# Patient Record
Sex: FEMALE
[De-identification: names, ages, dates, MRNs, and addresses within clinical notes are randomized per-mention and may not be internally consistent; named-entity substitution may affect disease eponyms.]

---

## 2018-03-19 ENCOUNTER — HOSPITAL ENCOUNTER (INPATIENT)
Dept: HOSPITAL 68 - ERH | Age: 58
LOS: 7 days | Discharge: HOSPICE | DRG: 207 | End: 2018-03-26
Attending: INTERNAL MEDICINE | Admitting: INTERNAL MEDICINE
Payer: COMMERCIAL

## 2018-03-19 VITALS — HEIGHT: 59 IN | WEIGHT: 177 LBS | BODY MASS INDEX: 35.68 KG/M2

## 2018-03-19 DIAGNOSIS — Z99.11: ICD-10-CM

## 2018-03-19 DIAGNOSIS — R33.9: ICD-10-CM

## 2018-03-19 DIAGNOSIS — N35.9: ICD-10-CM

## 2018-03-19 DIAGNOSIS — Z79.4: ICD-10-CM

## 2018-03-19 DIAGNOSIS — J69.0: Primary | ICD-10-CM

## 2018-03-19 DIAGNOSIS — G89.29: ICD-10-CM

## 2018-03-19 DIAGNOSIS — I45.10: ICD-10-CM

## 2018-03-19 DIAGNOSIS — R91.1: ICD-10-CM

## 2018-03-19 DIAGNOSIS — D63.1: ICD-10-CM

## 2018-03-19 DIAGNOSIS — F32.9: ICD-10-CM

## 2018-03-19 DIAGNOSIS — G93.1: ICD-10-CM

## 2018-03-19 DIAGNOSIS — E03.9: ICD-10-CM

## 2018-03-19 DIAGNOSIS — R15.9: ICD-10-CM

## 2018-03-19 DIAGNOSIS — N12: ICD-10-CM

## 2018-03-19 DIAGNOSIS — N36.5: ICD-10-CM

## 2018-03-19 DIAGNOSIS — E27.40: ICD-10-CM

## 2018-03-19 DIAGNOSIS — E66.3: ICD-10-CM

## 2018-03-19 DIAGNOSIS — Z79.52: ICD-10-CM

## 2018-03-19 DIAGNOSIS — K21.9: ICD-10-CM

## 2018-03-19 DIAGNOSIS — R32: ICD-10-CM

## 2018-03-19 DIAGNOSIS — Z66: ICD-10-CM

## 2018-03-19 DIAGNOSIS — J96.01: ICD-10-CM

## 2018-03-19 DIAGNOSIS — N18.6: ICD-10-CM

## 2018-03-19 DIAGNOSIS — G47.33: ICD-10-CM

## 2018-03-19 DIAGNOSIS — E21.3: ICD-10-CM

## 2018-03-19 DIAGNOSIS — I10: ICD-10-CM

## 2018-03-19 DIAGNOSIS — E10.641: ICD-10-CM

## 2018-03-19 DIAGNOSIS — Z87.891: ICD-10-CM

## 2018-03-19 DIAGNOSIS — N17.9: ICD-10-CM

## 2018-03-19 DIAGNOSIS — M45.9: ICD-10-CM

## 2018-03-19 DIAGNOSIS — E10.22: ICD-10-CM

## 2018-03-19 DIAGNOSIS — R00.1: ICD-10-CM

## 2018-03-19 DIAGNOSIS — E10.319: ICD-10-CM

## 2018-03-19 DIAGNOSIS — I46.9: ICD-10-CM

## 2018-03-19 DIAGNOSIS — I47.2: ICD-10-CM

## 2018-03-19 DIAGNOSIS — I21.A1: ICD-10-CM

## 2018-03-19 DIAGNOSIS — Z51.5: ICD-10-CM

## 2018-03-19 LAB
ABSOLUTE GRANULOCYTE CT: 14.5 /CUMM (ref 1.4–6.5)
APTT BLD: 28 SEC (ref 25–37)
BASOPHILS # BLD: 0 /CUMM (ref 0–0.2)
BASOPHILS NFR BLD: 0.2 % (ref 0–2)
EOSINOPHIL # BLD: 0.1 /CUMM (ref 0–0.7)
EOSINOPHIL NFR BLD: 0.6 % (ref 0–5)
ERYTHROCYTE [DISTWIDTH] IN BLOOD BY AUTOMATED COUNT: 18.6 % (ref 11.5–14.5)
GRANULOCYTES NFR BLD: 89.2 % (ref 42.2–75.2)
HCT VFR BLD CALC: 34.8 % (ref 37–47)
LYMPHOCYTES # BLD: 1.5 /CUMM (ref 1.2–3.4)
MCH RBC QN AUTO: 26.3 PG (ref 27–31)
MCHC RBC AUTO-ENTMCNC: 32.7 G/DL (ref 33–37)
MCV RBC AUTO: 80.5 FL (ref 81–99)
MONOCYTES # BLD: 0.1 /CUMM (ref 0.1–0.6)
PLATELET # BLD: 405 /CUMM (ref 130–400)
PMV BLD AUTO: 6.9 FL (ref 7.4–10.4)
PROTHROMBIN TIME: 13.1 SEC (ref 9.4–12.5)
RED BLOOD CELL CT: 4.33 /CUMM (ref 4.2–5.4)
WBC # BLD AUTO: 16.2 /CUMM (ref 4.8–10.8)

## 2018-03-19 PROCEDURE — 06HY33Z INSERTION OF INFUSION DEVICE INTO LOWER VEIN, PERCUTANEOUS APPROACH: ICD-10-PCS | Performed by: EMERGENCY MEDICINE

## 2018-03-19 PROCEDURE — 5A1955Z RESPIRATORY VENTILATION, GREATER THAN 96 CONSECUTIVE HOURS: ICD-10-PCS | Performed by: INTERNAL MEDICINE

## 2018-03-19 PROCEDURE — 0BH17EZ INSERTION OF ENDOTRACHEAL AIRWAY INTO TRACHEA, VIA NATURAL OR ARTIFICIAL OPENING: ICD-10-PCS | Performed by: INTERNAL MEDICINE

## 2018-03-19 PROCEDURE — P9016 RBC LEUKOCYTES REDUCED: HCPCS

## 2018-03-19 PROCEDURE — 0T9B70Z DRAINAGE OF BLADDER WITH DRAINAGE DEVICE, VIA NATURAL OR ARTIFICIAL OPENING: ICD-10-PCS | Performed by: UROLOGY

## 2018-03-19 NOTE — RADIOLOGY REPORT
EXAMINATION:
CHEST 1 VIEW
 
CLINICAL INFORMATION:
Endotracheal tube placement.
 
COMPARISON:
Same day chest radiograph obtained at 0906 hours.
 
TECHNIQUE:
An AP view of the chest is provided.
 
FINDINGS:
The cardiac silhouette is not enlarged. An endotracheal tube has been placed.
The tip is approximately 5 to 10 mm above the kaushal. There are neither
pleural effusions nor pneumothoraces. There is a persistent right upper lobe
mass adjacent to the mediastinal pleura with mild adjacent nonspecific patchy
airspace disease. There is improved aeration at the left lung base with
minimal airspace disease persisting. The osseous structures are unremarkable.
 
IMPRESSION:
 
Tip of endotracheal tube approximately 5 to 10 mm above the kaushal.
 
Persistent right upper lobe mass.
 
Improved aeration at the left lung base.

## 2018-03-19 NOTE — EVENT NOTE
Event Note
Event Note:
Situation
Hypoglycemia now resolved
 
Background
58 year old insulin diabetic admitted for unresponsive subsequently developing 
cardiac arrest s/p CPR/ROSC. Insulin pump was reported discontinued yesterday, 
however her insulin level today is found to be high.
 
Assessment
Patient with apparent unintended insulin overdose with profound hypoglycemia and
cardiac arrest. Patient was initially hypotensive requiring levophed, dopamine, 
and hydrocortisone. She was maintained on D10 in addition to D5 + Bicarb. Blood 
sugars jeyson to over 200. Endocrinologist Dr Olmedo was made aware of these whom 
recommended discontinuing D10 and evening hydrocortisone (cortisol was found to 
be normal) and started levemir / novolog coverage. Insulin pump was provided by 
 and was placed with patients belongings. SBP was found to be greater 
that 180 for which dopamine was discontinued and levophed reduced.
 
Plan
-Discontinue D10
-Continue D5 + Bicarb
-Start Levemir 12 units SC BID
-Start Novolog SSI with Accuchecks Q4H
-Skip evening hydrocortisone
-Titrate Levophed as needed

## 2018-03-19 NOTE — RADIOLOGY REPORT
EXAMINATION:
XR PORTABLE CHEST
 
CLINICAL INFORMATION:
Repositioning of endotracheal tube.
 
COMPARISON:
03/19/2018 at 5:00 PM
 
TECHNIQUE:
Portable frontal view of the chest was obtained.
 
FINDINGS:
Since the prior study there has been retraction of the endotracheal tube now
terminating approximately 5.6 cm above the kaushal. A nasogastric tube is now
evident with tip projecting over the stomach. The cardiomediastinal
silhouette is stable. There are hazy bilateral airspace opacities seen in
both lungs, slightly more conspicuous at the left base than previously which
may be technical in nature. A small left-sided pleural effusion may be
present. No pneumothoraces are visualized.
 
IMPRESSION:
Endotracheal tube terminates 5.6 cm above the kaushal. Nasogastric tube tip
projects over the stomach.
 
Bilateral patchy airspace opacities are redemonstrated, perhaps slightly
increased in conspicuity at the left base relative to the prior study.

## 2018-03-19 NOTE — CONS- ENDOCRINOLOGY
General Information and HPI
 
Consulting Request
Date of Consult: 03/19/18
Requested By: ICU team
Reason for Consult:
management of DM type 1  and severe hypoglycemia and unresponsiveness
Source of Information: family
Exam Limitations: unable to give history
History of Present Illness:
57 y/o female hx of longstanding DM type 1, chronic renal disease due to 
diabetes and chronic interstitual nephritis and was treated with a couse of 
steroid. As per patient's , patient took prednisone 10 mg last night. Her
glucose level was running low and insulin pump was supposed to be discontinued 
last night.
 
Patient was found to be unresponsive with foaming around her mouth. EMS was 
called and her glucose level was in the 20s. IV dextrose was given. Her glucose 
level improved temporarily and then dropped again.
 
When I was called to see the patient in ER at around 4 pm, her FSG was in the 
50s and her SBP was in the 50s as well. She was intubated. She was on bicarb 
drip in d5w. Stress dose of steroid and D10 w were recommended.  Then patient 
had cardiac arrest and she was coded.
 
NOw she is in ICU. She is on pressor and bicarb drip in D5W. Her BP and glucse 
level were better. D10 w was discontinued. Her recent FSG was 252.
 
 
 
Allergies/Medications
Allergies:
Uncoded Allergies:
all pain medications (NAUSEA 10/14/14)
 
Home Med List:
Amlodipine Besylate 10 MG TABLET   1 TAB PO DAILY HEART  (Reported)
Aspirin (Aspirin*) 81 MG TAB.CHEW   1 TAB PO DAILY HEART HEALTH  (Reported)
Escitalopram Oxalate 5 MG TABLET   1 TAB PO DAILY MENTAL HEALTH  (Reported)
Furosemide 40 MG TABLET   1 TAB PO DAILY WATER RETENTION  (Reported)
Insulin Aspart (Novolog) (Unknown Strength) VIAL   (Unknown Dose) AC DIABETES  (
Reported)
Levothyroxine Sodium 50 MCG TABLET   1 TAB PO DAILY AC THYROID  (Reported)
Losartan Potassium 100 MG TABLET   1 TAB PO DAILY HEART  (Reported)
Prednisone 10 MG TABLET   1 TAB PO DAILY STEROID  (Reported)
Sevelamer Carbonate (Renvela) 800 MG TABLET   1 TAB PO AC UNKNOWN  (Reported)
 
 
Review of Systems
 
Review of Systems
Constitutional:
Reports: see HPI (intubated). 
 
Past History
 
Travel History
Traveled to [place] past 21 day No
 
Medical History
Blood Transfusion Hx: No
Neurological: NONE
EENT: NONE
Cardiovascular: hypertension
Respiratory: obstructive sleep apnea
Gastrointestinal: NONE
Hepatic: NONE
Renal: chronic kidney disease
Musculoskeletal: arthritis
Psychiatric: NONE
Endocrine: diabetes type 1
Blood Disorders: NONE
Cancer(s): NONE
GYN/Reproductive: NONE
 
Surgical History
Surgical History: L AVF
 
Family History
Relations & Conditions If Any:
MOTHER (Diabetes, hyperlipidemia).
 
 
Psychosocial History
Where Do You Live? Home
Services at Home: None
Smoking Status: Former Smoker
ETOH Use: occasional use
Illicit Drug Use: denies illicit drug use
 
Exam & Diagnostic Data
Last 24 Hrs of Vital Signs/I&O
 Vital Signs
 
 
Date Time Temp Pulse Resp B/P B/P Pulse O2 O2 Flow FiO2
 
     Mean Ox Delivery Rate 
 
03/19 2000         75
 
03/19 2000      99 Ventilator 85% 
 
03/19 1950       Ventilator 85% 
 
03/19 1825 100.0 126 18 136/64  100 Ventilator 100% 
 
03/19 1758 99.7 128 18 181/68  100 Ventilator  
 
03/19 1727 100.1 126 20 136/64  100 Ventilator 100% 
 
03/19 1705  165 18 199/80  98 Ventilator 100% 
 
03/19 1623         100
 
03/19 1553 97.9 122 18 152/72  100   
 
03/19 1430  111    93   
 
03/19 1419  110 24 119/63  97 BIPAP 60% 
 
03/19 1330      96   
 
03/19 1210 96.9 96 24 128/72  99 BIPAP 60% 
 
03/19 1150  111    97   
 
03/19 1102 97.0 90 24 137/83  99 BIPAP 60% 
 
03/19 1000  88 24 129/78  100 BIPAP 100% 
 
03/19 1000  111    96   
 
03/19 0900      97 Non 9L 
 
       ReBreather  
 
03/19 0845      96 Non 100% 
 
       ReBreather  
 
03/19 0842 97.5 110 28 151/79  87 Room Air Room Air 
 
 
 Intake & Output
 
 
 03/19 1600 03/19 0800 03/19 0000
 
Intake Total 1250  
 
Output Total 3  
 
Balance 1247  
 
    
 
Intake, IV 1250  
 
Output, Urine 3  
 
Patient 160 lb  
 
Weight   
 
Weight Estimated  
 
Measurement   
 
Method   
 
 
 
 
Physical Exam
General Appearance: intubated
Respiratory: coarse breath  sound
Cardiovascular: bradycardia ( in ER)
Extremities: no edema
Labs/Sid Results:
 Laboratory Tests
 
 
 03/19 03/19 03/19 03/19 2057 1931 1931 1710
 
Blood Gas    
 
  pH (7.35 - 7.45 PH)    7.06 *L
 
  pCO2 (35 - 45 TORR)    42
 
  pO2 (80 - 100 TORR)    167  H
 
  HCO3 (21 - 28 MEQ/L)    12  L
 
  ABG O2 Sat (Measured) (>96.0 %)    97.0
 
  P-50 (Temp Corrected)    N
 
  Carboxyhemoglobin (1.5 - 5.0 %)    0.3  L
 
  O2 Concentration %    100%
 
  Temperature (97.0 - 100.0 FARH)    97.5
 
  Respiration Rate (BPM)    26
 
  O2 Delivery Method    ESPRIT VENT
 
  Vent Mode    AC
 
  Expiratory Pressure (CMH2O/P)    5
 
  Tidal Volume (CC)    500
 
Chemistry    
 
  Sodium (137 - 145 mmol/L)   144 
 
  Potassium (3.5 - 5.1 mmol/L)   5.6  H 
 
  Chloride (98 - 107 mmol/L)   108  H 
 
  Carbon Dioxide (22 - 30 mmol/L)   15  L 
 
  Anion Gap (5 - 16)   20  H 
 
  BUN (7 - 17 mg/dL)   95  H 
 
  Creatinine (0.5 - 1.0 mg/dL)   6.3 *H 
 
  Estimated GFR (>60 ml/min)   7  L 
 
  Glucose (65 - 99 mg/dL)   228  H 
 
  Lactic Acid (0.7 - 2.1 mmol/L)  3.8  H  
 
  Calcium (8.4 - 10.2 mg/dL)   7.2  L 
 
  Phosphorus (2.5 - 4.5 mg/dL)   10.7  H 
 
  Magnesium (1.6 - 2.3 mg/dL)   1.7 
 
  Total Bilirubin (0.2 - 1.3 mg/dL)   0.5 
 
  AST (14 - 36 U/L)   253  H 
 
  ALT (9 - 52 U/L)   161  H 
 
  Troponin I Pending   
 
  Albumin (3.5 - 5.0 g/dL)   2.3  L 
 
Coagulation    
 
  PT (9.4 - 12.5 SEC)   13.1  H 
 
  INR (0.90 - 1.19)   1.20  H 
 
  APTT (25 - 37 SEC)   28 
 
Miscellaneous    
 
  Phlebotomy Draw Site    RIGHT BRACHIAL
 
 
 
 
 03/19 03/19 03/19
 
 1510 1307 1140
 
Blood Gas   
 
  pH (7.35 - 7.45 PH)   7.29 *L
 
  pCO2 (35 - 45 TORR)   32  L
 
  pO2 (80 - 100 TORR)   238  H
 
  HCO3 (21 - 28 MEQ/L)   15  L
 
  ABG O2 Sat (Measured) (>96.0 %)   98.0
 
  Carboxyhemoglobin (1.5 - 5.0 %)   0.3  L
 
  O2 Concentration %   100%
 
  Respiration Rate (BPM)   24
 
  O2 Delivery Method   BIPAP
 
  Vent Mode   ST
 
  Expiratory Pressure (CM H2O P)   6
 
  Inspiratory Pressure (CM H2O P)   16
 
Chemistry   
 
  Lactic Acid (0.7 - 2.1 mmol/L)  2.0 
 
  Troponin I (< 0.11 ng/ml) 3.04 *H  
 
Miscellaneous   
 
  Phlebotomy Draw Site   RIGHT RADIAL
 
 
 
 
 03/19 03/19
 
 1000 0900
 
Blood Gas  
 
  pH (7.35 - 7.45 PH)  7.23 *L
 
  pCO2 (35 - 45 TORR)  38
 
  pO2 (80 - 100 TORR)  92
 
  HCO3 (21 - 28 MEQ/L)  16  L
 
  ABG O2 Sat (Measured) (>96.0 %)  94.0  L
 
  Carboxyhemoglobin (1.5 - 5.0 %)  0.5  L
 
  O2 Concentration %  100%
 
  O2 Delivery Method  NRB
 
Chemistry  
 
  Sodium (137 - 145 mmol/L) 146  H 
 
  Potassium (3.5 - 5.1 mmol/L) 4.7 
 
  Chloride (98 - 107 mmol/L) 111  H 
 
  Carbon Dioxide (22 - 30 mmol/L) 16  L 
 
  Anion Gap (5 - 16) 18  H 
 
  BUN (7 - 17 mg/dL) 95  H 
 
  Creatinine (0.5 - 1.0 mg/dL) 6.2 *H 
 
  Estimated GFR (>60 ml/min) 7  L 
 
  BUN/Creatinine Ratio (7 - 25 %) 15.3 
 
  Glucose (65 - 99 mg/dL) 86 
 
  Hemoglobin A1c (4.2 - 5.8 %) 8.8  H 
 
  Insulin Level (3.0 - 25.0 mIU/mL) 31.0  H 
 
  Lactic Acid (0.7 - 2.1 mmol/L) 2.3  H 
 
  Calcium (8.4 - 10.2 mg/dL) 8.2  L 
 
  Phosphorus (2.5 - 4.5 mg/dL) 8.8  H 
 
  Magnesium (1.6 - 2.3 mg/dL) 1.8 
 
  Total Bilirubin (0.2 - 1.3 mg/dL) 0.4 
 
  AST (14 - 36 U/L) 29 
 
  ALT (9 - 52 U/L) 28 
 
  Alkaline Phosphatase (<127 U/L) 48 
 
  Creatine Kinase (30 - 135 U/L) 248  H 
 
  Troponin I (< 0.11 ng/ml) 2.31 *H 
 
  Total Protein (6.3 - 8.2 g/dL) 4.9  L 
 
  Albumin (3.5 - 5.0 g/dL) 2.7  L 
 
  Globulin (1.9 - 4.2 gm/dL) 2.2 
 
  Albumin/Globulin Ratio (1.1 - 2.2 %) 1.2 
 
  TSH (0.270 - 4.200 uIU/mL) 9.510  H 
 
  Free T4 (0.78 - 2.44 ng/dL) 0.89 
 
  Prolactin (3.0 - 18.6 ng/mL) 29.0  H 
 
  Cortisol AM Sample (4.46 - 22.7 ug/dL) 26.5  H 
 
Hematology  
 
  CBC w Diff MAN DIFF ORDERED 
 
  WBC (4.8 - 10.8 /CUMM) 16.2  H 
 
  RBC (4.20 - 5.40 /CUMM) 4.33 
 
  Hgb (12.0 - 16.0 G/DL) 11.4  L 
 
  Hct (37 - 47 %) 34.8  L 
 
  MCV (81.0 - 99.0 FL) 80.5  L 
 
  MCH (27.0 - 31.0 PG) 26.3  L 
 
  MCHC (33.0 - 37.0 G/DL) 32.7  L 
 
  RDW (11.5 - 14.5 %) 18.6  H 
 
  Plt Count (130 - 400 /CUMM) 405  H 
 
  MPV (7.4 - 10.4 FL) 6.9  L 
 
  Gran % (42.2 - 75.2 %) 89.2  H 
 
  Lymphocytes % (20.5 - 51.1 %) 9.1  L 
 
  Monocytes % (1.7 - 9.3 %) 0.9  L 
 
  Eosinophils % (0 - 5 %) 0.6 
 
  Basophils % (0.0 - 2.0 %) 0.2 
 
  Absolute Granulocytes (1.4 - 6.5 /CUMM) 14.5  H 
 
  Segmented Neutrophils (42.2 - 75.2 %) 73 
 
  Band Neutrophils (0.0 - 5.0 %) 13  H 
 
  Absolute Lymphocytes (1.2 - 3.4 /CUMM) 1.5 
 
  Lymphocytes (20.5 - 51.1 %) 11  L 
 
  Monocytes (1.7 - 9.3 %) 3 
 
  Absolute Monocytes (0.10 - 0.60 /CUMM) 0.1 
 
  Absolute Eosinophils (0.0 - 0.7 /CUMM) 0.1 
 
  Absolute Basophils (0.0 - 0.2 /CUMM) 0 
 
  Platelet Estimate (ADEQUATE) INCREASED 
 
  Normocytic RBCs VERIFIED 
 
  Normochromic RBCs VERIFIED 
 
  Poikilocytosis FEW 
 
  Lilliam Cells FEW 
 
Miscellaneous  
 
  Phlebotomy Draw Site  RIGHT BRACHIAL
 
 
 
 
Assessment/Plan
Assessment/Plan
57 y/o female hx of longstanding DM type 1, chronic renal disease due to 
diabetes and chronic interstitual nephritis and was treated with a couse of 
steroid. As per patient's , patient took prednisone 10 mg last night. Her
glucose level was running low and insulin pump was supposed to be discontinued 
last night.
 
Patient was found to be unresponsive with glucose level was in the 20s. Blood 
work in ER showed inappropriately elevated insulin level.  Her severe 
hypoglycemia most likely is due to insulin overdose.
 
She received hydrocortisone 100 mg iv in ER. She was on D10 w and pressor. Now 
her BP and glucose level improved.
 
According to her insulin pump-- [employer] 630 G-- her basal insulin 36.75 units 
per 24 hours.
 
Plan for now:
1. start evemir 12 units twice a day;
2. Novolog coveage every 4 hours--detail see the inpatient DM order;
3. Levothyroxine 25 mcg iv daily;
4. might consider stress dose of hydrocortisone 50 mg iv every 12 hours tomorrow
;
5. monitor FSG every 1-2 hours; insulin drip as it is indicated;
6. monitor electrolytes;
7. continue other treatments as per team;
will follow
6. 
 
 
Inpatient Diabetes Orders
Every 4 Hours:
   Bolus Insulin: Novolog
   < 80 mg/dl: no coverage
    mg/dl: no coverage
   101-120 mg/dl: no coverage
   121-150 mg/dl: no coverage
   151-200 mg/dl: 2 units
   201-250 mg/dl: 4 units
   251-300 mg/dl: 6 units
   301-350 mg/dl: 8 units
   351-400 mg/dl: 10 units
   > 400 mg/dl: 12 units
 
Consult Acknowledgment
- Thank you for your consult request.

## 2018-03-19 NOTE — ED GENERAL ADULT
History of Present Illness
 
General
Chief Complaint: Altered Mental Status
Stated Complaint: BIBA, UNRESPONSIVE
Source: patient, family, old records, EMS
Exam Limitations: clinical condition
Allergies
Uncoded Allergies:
all pain medications (NAUSEA 10/14/14)
 
Reconcile Medications
Amlodipine Besylate 10 MG TABLET   1 TAB PO DAILY HEART  (Reported)
Aspirin (Aspirin*) 81 MG TAB.CHEW   1 TAB PO DAILY HEART HEALTH  (Reported)
Escitalopram Oxalate 5 MG TABLET   1 TAB PO DAILY MENTAL HEALTH  (Reported)
Furosemide 40 MG TABLET   1 TAB PO DAILY WATER RETENTION  (Reported)
Insulin Aspart (Novolog) (Unknown Strength) VIAL   (Unknown Dose) AC DIABETES  (
Reported)
Levothyroxine Sodium 50 MCG TABLET   1 TAB PO DAILY AC THYROID  (Reported)
Losartan Potassium 100 MG TABLET   1 TAB PO DAILY HEART  (Reported)
Prednisone 10 MG TABLET   1 TAB PO DAILY STEROID  (Reported)
Sevelamer Carbonate (Renvela) 800 MG TABLET   1 TAB PO AC UNKNOWN  (Reported)
 
Core Measure Meds Pre-Hospital aspirin
Triage Note:
BIBA FROM HOME, PER EMS MALE FAMILY MEMBER AT
 HOUSE CALLED 911 FOR UNRESPONSIVE THIS AM. PT IS
 DIALYSIS PT LEFT ARM RESTRICTED, ACCUCHECK ON
 SCENE 27, GIVEN D10 ENROUTE, REPEAT ENROUTE 86 AND
 ON ARRIVAL 160'S. PT REMAINS UNRESPONSIVE, UPPER
 AIRWAY CONGESTION NOTED, O2 HIGH FLOW
 NON-REBREATHER PLACED, O2 SATS ON ARRIVAL 86-87%,
 ON HIGH FLOW O2 SATS 96-98%, RESPIRATORY PAGED FOR
 ABG'S AND RESP EVAL.
Triage Nurses Notes Reviewed? yes
Onset: Just prior to arrival
Duration: hour(s):, constant, continues in ED
Timing: recent history
Injury Environment: home
Severity: severe
No Modifying Factors: none
Associated Symptoms: cough
LMP (ages 10-50): post menopausal
Pregnant: No
Patient currently breastfeeds: No
HPI:
1 day prior to admission patient felt weak and stayed in bed.  Her  saw 
her at 7 PM sleeps in another room.  He was up.  For work and found her with 
increased work of breathing nausea and vomiting not responsive to vocal 
stimulation.
EMS found patient hypoglycemic and administered D10.
He reports there's been no fever chills chest pain cough headache dysuria rash 
bleeding.
(Silva BARLOW,Madhu)
 
Vital Signs & Intake/Output
Vital Signs & Intake/Output
 Vital Signs
 
 
Date Time Temp Pulse Resp B/P B/P Pulse O2 O2 Flow FiO2
 
     Mean Ox Delivery Rate 
 
03/19 1758 99.7 128 18 181/68  100 Ventilator  
 
03/19 1727 100.1 126 20 136/64  100 Ventilator 100% 
 
03/19 1623         100
 
03/19 1430  111    93   
 
03/19 1419  110 24 119/63  97 BIPAP 60% 
 
03/19 1330      96   
 
03/19 1210 96.9 96 24 128/72  99 BIPAP 60% 
 
03/19 1150  111    97   
 
03/19 1102 97.0 90 24 137/83  99 BIPAP 60% 
 
03/19 1000  88 24 129/78  100 BIPAP 100% 
 
03/19 1000  111    96   
 
03/19 0900      97 Non 9L 
 
       ReBreather  
 
03/19 0845      96 Non 100% 
 
       ReBreather  
 
03/19 0842 97.5 110 28 151/79  87 Room Air Room Air 
 
 
 
(Ibrahima Johnston)
 
Past History
 
Travel History
Traveled to [place] past 21 day No
 
Medical History
Any Pertinent Medical History? see below for history
Cardiovascular: hypertension
Respiratory: obstructive sleep apnea
Renal: ESRD on HD
 
Surgical History
Surgical History: L AVF
 
Psychosocial History
What is your primary language English
Tobacco Use: UN
 
Family History
Hx Contributory? No
(Madhu Ascencio MD)
 
Review of Systems
 
Review of Systems
Constitutional:
Reports: see HPI, weakness. 
EENTM:
Reports: no symptoms. 
Respiratory:
Reports: see HPI, cough, short of breath. 
Cardiovascular:
Reports: no symptoms. 
GI:
Reports: see HPI, diarrhea, nausea, vomiting. 
Genitourinary:
Reports: no symptoms. 
Musculoskeletal:
Reports: no symptoms. 
Skin:
Reports: no symptoms. 
Neurological/Psychological:
Reports: see HPI, weakness. 
Hematologic/Endocrine:
Reports: no symptoms. 
Immunologic/Allergic:
Reports: no symptoms. 
All Other Systems: Reviewed and Negative
(Madhu Ascencio MD)
 
Physical Exam
 
Physical Exam
General Appearance: well developed/nourished, lethargic, severe distress, obese
Head: atraumatic, normal appearance
Eyes:
Bilateral: normal appearance, PERRL, other (nystagmus, rotatory). 
Ears, Nose, Throat: normal pharynx, normal ENT inspection
Neck: normal inspection, supple, full range of motion, no midline tenderness
Respiratory: chest non-tender, decreased breath sounds, rhonchi, rales, 
respiratory distress
Cardiovascular: regular rate/rhythm, normal peripheral pulses, tachycardia, 
norml femoral pulses equa
Peripheral Pulses:
4+ carotid (R), 4+ carotid (L)
Gastrointestinal: normal bowel sounds, soft, non-tender, no organomegaly
Back: normal inspection, normal range of motion, no vertebral tenderness
Extremities: normal inspection, normal capillary refill, normal range of motion,
no ligament instability
Neurologic/Psych: disoriented x 3, motor/sensory deficits
Reflexes:
2+: bicep (R), bicep (L). 
Skin: intact, normal color, warm/dry
Lymphatic: no anterior cervical cullen
 
Core Measures
ACS in differential dx? Yes
CVA/TIA Diagnosis: No
Sepsis Present: Yes
Sepsis Focused Exam Completed? Yes
(Madhu Ascencio MD)
 
ED Sepsis Exam
Date of Focused Sepsis Exam: 03/19/18
Time of Focused Sepsis Exam: 1700
Sepsis Cardiac Exam: Tachycardia
Sepsis Resp Exam: Ronchi
Sepsis Cap Refill Exam: >2 sec
Sepsis Peripheral Pulse Exam: Weak
Sepsis Peripheral Pulse Location: Radial
Sepsis Skin Color Exam: Pale
Skin Temp/Moisture Exam: Warm/Dry
(Madhu Ascencio MD)
 
Progress
Differential Diagnoses
I considered the following diagnoses in my evaluation of the patient: Aspiration
pneumonia stroke hypoglycemia DKA
 
Diagnostic Imaging:
Viewed by Me: Radiology Read, CT Scan.  Discussed w/RAD: Radiology Read, CT 
Scan. 
Radiology Impression: Nondiagnostic CT of the head secondary to significant 
streak/motion artifact despite repeating the acquisition. I cannot exclude 
infarcts nor hemorrhage on this study.
CXR Impression: 1. There is a stable mass in the right upper lobe, seen on prior
imaging. 2. Patchy opacification at the left base may be consistent with 
developing consolidation.
Initial ED EKG: normal axis, normal intervals, normal p-waves, normal QRS 
complex, rhythm (sinus tachycardia)
Prior EKG: unchanged
Rhythm Strip: sinus tachycardia
Comments:
Sepsis NS fluid bolus not given secondary to ESRD and potential for volume 
overload.
20 minutes after intubation patient was noted to be hypotensive with bradycardia
leading to asystole.
ACLS protocol started.  Patient with return of spontaneous circulation dopamine 
and norepinephrine drip started.
Dopamine stopped norepinephrine titrated.
Amiodarone administered for wide-complex tachycardia.
(Madhu Ascencio MD)
Differential Diagnoses
I cons
 
Plan of Care:
 Orders
 
 
Procedure Date/time Status
 
ARTERIAL BLOOD GAS (GEN) 03/20 0500 Active
 
XRY-PORTABLE CHEST XRAY 03/20 0500 Active
 
INSULIN,SERUM 03/20 0500 Active
 
ICU LAB BUNDLE 03/20 0500 Active
 
CBC WITHOUT DIFFERENTIAL 03/20 0500 Active
 
TROPONIN LEVEL 03/20 0300 Active
 
Nothing by Mouth 03/19 D Active
 
ICU LAB BUNDLE 03/19 2300 Active
 
TROPONIN LEVEL 03/19 2100 Active
 
EKG 03/19 2100 Active
 
PARTIAL THROMBOPLASTIN TIME 03/19 1906 Active
 
PROTHROMBIN TIME 03/19 1906 Active
 
ICU LAB BUNDLE 03/19 1904 Active
 
XRY-PORTABLE CHEST XRAY 03/19 1859 Active
 
VRE ACTIVE SURVIELLANCE 03/19 1849 Active
 
VENTILATOR PARAMETERS 03/19 1845 Complete
 
LACTIC ACID 03/19 1834 Active
 
VRE ACTIVE SURVIELLANCE 03/19 1823 Active
 
ACTIVE SURVEILLANCE NARES 03/19 1823 Active
 
Lab Add-on Test 03/19 1740 Active
 
TYPE & SCREEN (NOT X-MATCH) 03/19 1724 Active
 
EKG 03/19 1703 Active
 
URINE DRUGS OF ABUSE 03/19 1646 Active
 
Lab Add-on Test 03/19 1621 Active
 
Add-on Test (ER Only) 03/19 1606 Active
 
VENTILATOR PARAMETERS 03/19 1600 Complete
 
Add-on Test (ER Only) 03/19 1520 Active
 
LOWER RESPIRATORY CULTURE 03/19 1444 Active
 
ECHOCARDIOGRAM 03/19 1444 Active
 
Lab Add-on Test 03/19 1443 Active
 
TROPONIN LEVEL 03/19 1442 Complete
 
EKG 03/19 1442 Active
 
TRC EVALUATION (GEN) 03/19 1441 Active
 
Saline Lock 03/19 1441 Active
 
Pathway - chart 03/19 1441 Active
 
House Staff 03/19 1441 Active
 
Code Status 03/19 1441 Active
 
Patient Data 03/19 1422 Active
 
Admit to inpatient 03/19 1326 Active
 
BIPAP 03/19 1156 Complete
 
ARTERIAL BLOOD GAS (GEN) 03/19 1140 Complete
 
LACTIC ACID 03/19 1132 Complete
 
RAPID VIRAL INFLUENZA A 03/19 1020 Complete
 
BIPAP 03/19 1000 Complete
 
THYROID STIMULATING HORMONE 03/19 1000 Complete
 
PROLACTIN 03/19 1000 Complete
 
PHOSPHORUS 03/19 1000 Complete
 
INSULIN,SERUM 03/19 1000 Complete
 
GLYCOSYLATED HGB 03/19 1000 Complete
 
FREE T4 03/19 1000 Complete
 
CORTISOL AM 03/19 1000 Complete
 
Jose, Insertion/Removal/Asses 03/19 0922 Active
 
CULTURE,URINE 03/19 0922 Active
 
NIH Stroke Scale 03/19 0849 Active
 
ARTERIAL BLOOD GAS (GEN) 03/19 0832 Complete
 
BLOOD CULTURE 03/19 0832 Active
 
TROPONIN LEVEL 03/19 0832 Complete
 
MAGNESIUM 03/19 0832 Complete
 
LACTIC ACID 03/19 0832 Complete
 
COMPREHENSIVE METABOLIC PANEL 03/19 0832 Complete
 
CREATINE PHOSPHOKINASE 03/19 0832 Complete
 
CBC WITHOUT DIFFERENTIAL 03/19 0832 Complete
 
EKG 03/19 0832 Active
 
ARTERIAL BLOOD GAS (GEN) 03/19  UNK Complete
 
VTE Mechanical Prophylaxis 03/19  UNK Active
 
Vital Signs 03/19  UNK Active
 
OGT 03/19  UNK Active
 
Intake & Output 03/19  UNK Active
 
Hemoccult 03/19  UNK Active
 
FingerStick- Glucose 03/19  UNK Active
 
 
 Current Medications
 
 
  Sig/Susan Start time  Last
 
Medication Dose  Stop Time Status Admin
 
Hydrocortisone  50 MG Q12 03/20 1000 AC 
 
Sodium Succinate     
 
(Solucortef)     
 
Levothyroxine Sodium 25 MCG DAILY 03/20 1000 AC 
 
(Synthroid)     
 
Sodium Bicarbonate 150 MEQ Q10H 03/19 1900 AC 
 
(Sodium Bicarbonate      
 
8.4%)     
 
Dextrose/Water 1,000 ML    
 
(D5W 1000)     
 
Ampicillin Sodium/ 1,500 MG Q6 03/19 1800 CAN 
 
Sulbactam Sodium     
 
(Unasyn)     
 
Sodium Chloride 100 ML    
 
(Normal Saline 0.9%)     
 
Ampicillin Sodium/ 3,000 MG Q12 03/19 1730 AC 
 
Sulbactam Sodium     
 
(Unasyn)     
 
Sodium Chloride 100 ML    
 
(Normal Saline 0.9%)     
 
Heparin Sodium  25,000 UNIT Q24H 03/19 1730 AC 
 
(Porcine)     
 
(Heparin)     
 
Sodium Chloride 500 ML    
 
Pantoprazole Sodium 40 MG DAILY 03/19 1730 AC 
 
(Protonix)     
 
Acetaminophen 1,000 MG Q6P PRN 03/19 1445 AC 
 
(Ofirmev)     
 
Insulin Human Regular 0 Q6 03/19 1440 AC 03/19
 
(NovoLIN R)     1813
 
 
 Laboratory Tests
 
 
 
03/19/18 1710:
pH 7.06 *L, pCO2 42, pO2 167  H, HCO3 12  L, ABG O2 Sat (Measured) 97.0, P-50 (
Temp Corrected) N, Carboxyhemoglobin 0.3  L, O2 Concentration % 100%, 
Temperature 97.5, Respiration Rate 26, O2 Delivery Method ESPRIT VENT, Vent Mode
AC, Expiratory Pressure 5, Tidal Volume 500, Phlebotomy Draw Site RIGHT BRACHIAL
 
03/19/18 1510:
Troponin I 3.04 *H
 
03/19/18 1307:
Lactic Acid 2.0
 
03/19/18 1140:
pH 7.29 *L, pCO2 32  L, pO2 238  H, HCO3 15  L, ABG O2 Sat (Measured) 98.0, 
Carboxyhemoglobin 0.3  L, O2 Concentration % 100%, Respiration Rate 24, O2 
Delivery Method BIPAP, Vent Mode ST, Expiratory Pressure 6, Inspiratory Pressure
16, Phlebotomy Draw Site RIGHT RADIAL
 
03/19/18 1000:
Anion Gap 18  H, Estimated GFR 7  L, BUN/Creatinine Ratio 15.3, Glucose 86, 
Hemoglobin A1c 8.8  H, Insulin Level 31.0  H, Lactic Acid 2.3  H, Calcium 8.2  L
, Phosphorus 8.8  H, Magnesium 1.8, Total Bilirubin 0.4, AST 29, ALT 28, 
Alkaline Phosphatase 48, Creatine Kinase 248  H, Troponin I 2.31 *H, Total 
Protein 4.9  L, Albumin 2.7  L, Globulin 2.2, Albumin/Globulin Ratio 1.2, TSH 
9.510  H, Free T4 0.89, Prolactin 29.0  H, Cortisol AM Sample 26.5  H, CBC w 
Diff MAN DIFF ORDERED, RBC 4.33, MCV 80.5  L, MCH 26.3  L, MCHC 32.7  L, RDW 
18.6  H, MPV 6.9  L, Gran % 89.2  H, Lymphocytes % 9.1  L, Monocytes % 0.9  L, 
Eosinophils % 0.6, Basophils % 0.2, Absolute Granulocytes 14.5  H, Segmented 
Neutrophils 73, Band Neutrophils 13  H, Absolute Lymphocytes 1.5, Lymphocytes 11
 L, Monocytes 3, Absolute Monocytes 0.1, Absolute Eosinophils 0.1, Absolute 
Basophils 0, Platelet Estimate INCREASED, Normocytic RBCs VERIFIED, Normochromic
RBCs VERIFIED, Poikilocytosis FEW, Lilliam Cells FEW
 
03/19/18 0900:
pH 7.23 *L, pCO2 38, pO2 92, HCO3 16  L, ABG O2 Sat (Measured) 94.0  L, 
Carboxyhemoglobin 0.5  L, O2 Concentration % 100%, O2 Delivery Method NRB, 
Phlebotomy Draw Site RIGHT BRACHIAL
 Microbiology
03/19 1849  GI: Surveillance Culture - ORD
03/19 1823  UPPER RESP: Surveillance Culture - ORD
03/19 1823  GI: Surveillance Culture - ORD
03/19 1444  LOWER RESP: Respiratory Culture - ORD
03/19 1444  LOWER RESP: Gram Stain - ORD
03/19 1030  NASOPHARYN: Influenza Virus A & B Rapid Smear - COMP
03/19 1000  BLOOD: Blood Culture - RECD
03/19 0922  URINE ROUT: Urine Culture - ORD
03/19 0832  BLOOD: Blood Culture - ORD
 
 
(Ibrahima Johnston)
 
Departure
 
Departure
Disposition: STILL A PATIENT
Condition: Stable
Clinical Impression
Primary Impression: Aspiration pneumonia due to gastric secretions
Qualifiers:  Laterality: left Lung location: lower lobe of lung Qualified Code: 
J69.0 - Pneumonitis due to inhalation of food and vomit
Secondary Impressions: End stage renal disease, Hypoglycemia, Metabolic acidosis
, NSTEMI (non-ST elevated myocardial infarction)
Referrals:
Prem Stoddard MD (PCP/Family)
 
Departure Forms:
Customer Survey
General Discharge Information
 
Admission Note
Spoke With:
Maria T BARLOW,CONSUELO Howard
Documentation of Exam:
Documentation of any treatments & extenuating circumstances including Concerns 
Regarding Discharge (functional status, medication knowledge or non-compliance, 
living conditions, etc.) that warrant an admission rather than observation: 
Supplemental oxygen / BiPAP, IV antibiotics, ICU monitoring, serial EKG, serial 
lab exam, frequent neurologic checks neurology evaluation, renal evaluation, ICU
evaluation, medication adjustment, continuing care discharge planning,
 
(Madhu Ascencio MD)
 
Procedures
 
Intubation
Time of Intubation: 1616
Intubation Method: orotracheal
Tube Size (cm): 7.0
Medications: succinylcholine, etomidate
Breath Sounds After Intubation: equal
Intubation Complications: no complications
Post Intubation Xray? Yes
(Madhu Ascencio MD)
 
Central Line
Central Line Lumen: triple
Central Line Procedure:
Yes: bentadine prep?, sterile drapes applied, sterile dressing applied. 
Central Line Position: femoral (R)
Anesthesia: lidocaine 1%
CC's of Anesthesia: 5
Complications: none
Central Line Post Position: sutured, good blood return
(Ibrahima Johnston)
 
Critical Care Note
 
Critical Care Note
Critical Care Time:  min (90)
Total CPR Time (mins): 10
(Madhu Ascencio MD)

## 2018-03-19 NOTE — CONS- INFECT DISEASE
General Information and HPI
 
Consulting Request
Date of Consult: 03/19/18
Requested By:
CONSUELO Live MD
 
Reason for Consult:
Rule out sepsis
Source of Information: family
Exam Limitations: unable to give history, clinical condition
History of Present Illness:
This is a 58-year-old woman with a history of diabetes, end-stage renal disease,
status post creation of a left upper extremity fistula 6 months prior to 
admission in anticipation of dialysis, maintained on steroids for the last 
several months for biopsy-proven interstitial nephritis, status post a 
nondiagnostic navigational bronchoscopy and biopsy 4 months prior to admission 
for a right upper lobe mass, noted to be lethargic yesterday with recent URI 
symptoms, admitted today after she was found by her  this morning to be 
unresponsive, with a glucose of 27 reported en route to the hospital.  On 
arrival to the emergency room she was found to be unresponsive with foam around 
the mouth, with intermittent episodes of right arm stiffening noted.  She was 
afebrile, with a blood pressure of 151/79, an O2 sat of 86-87% on high flow 
oxygen and a glucose in the 160's.  Laboratory data revealed a white blood cell 
count of 16,000, with 73 segs and 13 bands, BUN/creatinine 95 and 6.2, lactic 
acid 2.3, with normal liver enzymes, , troponin 2.31, TSH 9.510, 
hemoglobin A1c 8.8, ABG 7.23/38/92 on 100% nonrebreather.  Chest x-ray revealed 
a stable mass in the right upper lobe and patchy opacification at the left lung 
base.  CT of the head, a limited study, was negative for any acute process.  She
was begun on Ceftriaxone and Azithromycin and was intubated.  She is now 
hypotensive, with a systolic blood pressure in the 50s.
 
 
Allergies/Medications
Allergies:
Uncoded Allergies:
all pain medications (NAUSEA 10/14/14)
 
Home Med List:
Amlodipine Besylate 10 MG TABLET   1 TAB PO DAILY HEART  (Reported)
Aspirin (Aspirin*) 81 MG TAB.CHEW   1 TAB PO DAILY HEART HEALTH  (Reported)
Escitalopram Oxalate 5 MG TABLET   1 TAB PO DAILY MENTAL HEALTH  (Reported)
Furosemide 40 MG TABLET   1 TAB PO DAILY WATER RETENTION  (Reported)
Insulin Aspart (Novolog) (Unknown Strength) VIAL   (Unknown Dose) AC DIABETES  (
Reported)
Levothyroxine Sodium 50 MCG TABLET   1 TAB PO DAILY AC THYROID  (Reported)
Losartan Potassium 100 MG TABLET   1 TAB PO DAILY HEART  (Reported)
Prednisone 10 MG TABLET   1 TAB PO DAILY STEROID  (Reported)
Sevelamer Carbonate (Renvela) 800 MG TABLET   1 TAB PO AC UNKNOWN  (Reported)
 
 
Past History
 
Travel History
Traveled to [place] past 21 day No
 
Medical History
Cardiovascular: hypertension
Respiratory: obstructive sleep apnea
Renal: chronic kidney disease
Musculoskeletal: arthritis
Endocrine: diabetes
 
Surgical History
Surgical History: L AVF
 
Review of Systems
Comments
Unobtainable
 
 
Exam & Diagnostic Data
Last 24 Hrs of Vital Signs/I&O
 Vital Signs
 
 
Date Time Temp Pulse Resp B/P B/P Pulse O2 O2 Flow FiO2
 
     Mean Ox Delivery Rate 
 
03/19 1430  111    93   
 
03/19 1419  110 24 119/63  97 BIPAP 60% 
 
03/19 1330      96   
 
03/19 1210 96.9 96 24 128/72  99 BIPAP 60% 
 
03/19 1150  111    97   
 
03/19 1102 97.0 90 24 137/83  99 BIPAP 60% 
 
03/19 1000  88 24 129/78  100 BIPAP 100% 
 
03/19 1000  111    96   
 
03/19 0900      97 Non 9L 
 
       ReBreather  
 
03/19 0845      96 Non 100% 
 
       ReBreather  
 
03/19 0842 97.5 110 28 151/79  87 Room Air Room Air 
 
 
 Intake & Output
 
 
 03/19 1600 03/19 0800 03/19 0000
 
Intake Total 1250  
 
Output Total 3  
 
Balance 1247  
 
    
 
Intake, IV 1250  
 
Output, Urine 3  
 
Patient 160 lb  
 
Weight   
 
Weight Estimated  
 
Measurement   
 
Method   
 
 
 
 
Physical Exam
Other Physical Findings:
Afebrile.  She is sedated and unresponsive now on the ventilator. Skin reveals 
no rash.  HEENT negative.  Neck is supple with no adenopathy.  Lungs bilateral 
rhonchi.  Heart regular rhythm with no murmur.  Abdomen is soft, nontender with 
positive bowel sounds.  Back no CVA tenderness.  Extremities no cyanosis, 
clubbing or edema; left upper extremity fistula with a positive bruit.  Neuro is
without focality.
 
Last 24 Hours of Lab Results:
 Laboratory Tests
 
 
 03/19 03/19 03/19
 
 1510 1307 1140
 
Blood Gas   
 
  pH (7.35 - 7.45 PH)   7.29 *L
 
  pCO2 (35 - 45 TORR)   32  L
 
  pO2 (80 - 100 TORR)   238  H
 
  HCO3 (21 - 28 MEQ/L)   15  L
 
  ABG O2 Sat (Measured) (>96.0 %)   98.0
 
  Carboxyhemoglobin (1.5 - 5.0 %)   0.3  L
 
  O2 Concentration %   100%
 
  Respiration Rate (BPM)   24
 
  O2 Delivery Method   BIPAP
 
  Vent Mode   ST
 
  Expiratory Pressure (CM H2O P)   6
 
  Inspiratory Pressure (CM H2O P)   16
 
Chemistry   
 
  Lactic Acid (0.7 - 2.1 mmol/L)  2.0 
 
  Troponin I (< 0.11 ng/ml) 3.04 *H  
 
Miscellaneous   
 
  Phlebotomy Draw Site   RIGHT RADIAL
 
 
 
 
 03/19 03/19
 
 1000 0900
 
Blood Gas  
 
  pH (7.35 - 7.45 PH)  7.23 *L
 
  pCO2 (35 - 45 TORR)  38
 
  pO2 (80 - 100 TORR)  92
 
  HCO3 (21 - 28 MEQ/L)  16  L
 
  ABG O2 Sat (Measured) (>96.0 %)  94.0  L
 
  Carboxyhemoglobin (1.5 - 5.0 %)  0.5  L
 
  O2 Concentration %  100%
 
  O2 Delivery Method  NRB
 
Chemistry  
 
  Sodium (137 - 145 mmol/L) 146  H 
 
  Potassium (3.5 - 5.1 mmol/L) 4.7 
 
  Chloride (98 - 107 mmol/L) 111  H 
 
  Carbon Dioxide (22 - 30 mmol/L) 16  L 
 
  Anion Gap (5 - 16) 18  H 
 
  BUN (7 - 17 mg/dL) 95  H 
 
  Creatinine (0.5 - 1.0 mg/dL) 6.2 *H 
 
  Estimated GFR (>60 ml/min) 7  L 
 
  BUN/Creatinine Ratio (7 - 25 %) 15.3 
 
  Glucose (65 - 99 mg/dL) 86 
 
  Hemoglobin A1c (4.2 - 5.8 %) 8.8  H 
 
  Insulin Level (3.0 - 25.0 mIU/mL) Pending 
 
  Lactic Acid (0.7 - 2.1 mmol/L) 2.3  H 
 
  Calcium (8.4 - 10.2 mg/dL) 8.2  L 
 
  Phosphorus (2.5 - 4.5 mg/dL) 8.8  H 
 
  Magnesium (1.6 - 2.3 mg/dL) 1.8 
 
  Total Bilirubin (0.2 - 1.3 mg/dL) 0.4 
 
  AST (14 - 36 U/L) 29 
 
  ALT (9 - 52 U/L) 28 
 
  Alkaline Phosphatase (<127 U/L) 48 
 
  Creatine Kinase (30 - 135 U/L) 248  H 
 
  Troponin I (< 0.11 ng/ml) 2.31 *H 
 
  Total Protein (6.3 - 8.2 g/dL) 4.9  L 
 
  Albumin (3.5 - 5.0 g/dL) 2.7  L 
 
  Globulin (1.9 - 4.2 gm/dL) 2.2 
 
  Albumin/Globulin Ratio (1.1 - 2.2 %) 1.2 
 
  TSH (0.270 - 4.200 uIU/mL) 9.510  H 
 
  Free T4 (0.78 - 2.44 ng/dL) 0.89 
 
  Prolactin (3.0 - 18.6 ng/mL) 29.0  H 
 
  Cortisol AM Sample (4.46 - 22.7 ug/dL) Pending 
 
Hematology  
 
  CBC w Diff MAN DIFF ORDERED 
 
  WBC (4.8 - 10.8 /CUMM) 16.2  H 
 
  RBC (4.20 - 5.40 /CUMM) 4.33 
 
  Hgb (12.0 - 16.0 G/DL) 11.4  L 
 
  Hct (37 - 47 %) 34.8  L 
 
  MCV (81.0 - 99.0 FL) 80.5  L 
 
  MCH (27.0 - 31.0 PG) 26.3  L 
 
  MCHC (33.0 - 37.0 G/DL) 32.7  L 
 
  RDW (11.5 - 14.5 %) 18.6  H 
 
  Plt Count (130 - 400 /CUMM) 405  H 
 
  MPV (7.4 - 10.4 FL) 6.9  L 
 
  Gran % (42.2 - 75.2 %) 89.2  H 
 
  Lymphocytes % (20.5 - 51.1 %) 9.1  L 
 
  Monocytes % (1.7 - 9.3 %) 0.9  L 
 
  Eosinophils % (0 - 5 %) 0.6 
 
  Basophils % (0.0 - 2.0 %) 0.2 
 
  Absolute Granulocytes (1.4 - 6.5 /CUMM) 14.5  H 
 
  Segmented Neutrophils (42.2 - 75.2 %) 73 
 
  Band Neutrophils (0.0 - 5.0 %) 13  H 
 
  Absolute Lymphocytes (1.2 - 3.4 /CUMM) 1.5 
 
  Lymphocytes (20.5 - 51.1 %) 11  L 
 
  Monocytes (1.7 - 9.3 %) 3 
 
  Absolute Monocytes (0.10 - 0.60 /CUMM) 0.1 
 
  Absolute Eosinophils (0.0 - 0.7 /CUMM) 0.1 
 
  Absolute Basophils (0.0 - 0.2 /CUMM) 0 
 
  Platelet Estimate (ADEQUATE) INCREASED 
 
  Normocytic RBCs VERIFIED 
 
  Normochromic RBCs VERIFIED 
 
  Poikilocytosis FEW 
 
  Goodman Cells FEW 
 
Miscellaneous  
 
  Phlebotomy Draw Site  RIGHT BRACHIAL
 
 
 
Last 24 Hours of Sid Results:
Blood culture 1 March 19 pending
 
Rapid flu swab March 19 negative
 
 
Diagnostic Data
Recent Imaging Findings:
Chest x-ray revealed a stable mass in the right upper lobe and patchy 
opacification at the left lung base.  
 
CT of the head, a limited study, was negative for any acute process.
 
 
Assessment/Plan
 
Assessment/Plan
Impression:
This is a 58-year-old woman with a history of diabetes, end-stage renal disease,
 maintained on steroids for the last several months for interstitial nephritis, 
admitted this morning after she was found to be unresponsive at home, with 
increased lethargy and URI symptoms over the last day, found en route to the 
hospital to have a glucose of 27 and noted to have intermittent episodes of 
right arm stiffening, currently afebrile and hypotensive, intubated for hypoxia,
with a leukocytosis/bandemia and elevated troponin and patchy opacification at 
the left lung base on chest x-ray. 
 
The etiology of her illness is unclear.  Her unresponsiveness may be related to 
hypoglycemia, with the report of episodic stiffening of her right arm raising 
concern for seizure activity.  Her leukocytosis and bandemia suggest sepsis, 
though she has been on steroids chronically, which could explain her 
leukocytosis.  She does have a patchy density at the left lung base, which could
be secondary to aspiration.  The right upper lobe mass has been noted before, 
with a navigational bronchoscopy/biopsy 4 months prior to admission 
nondiagnostic.  Her elevated troponin is of unclear significance with her renal 
failure.
 
Suggestion:
1.  Obtain an additional blood culture
2.  Sputum culture
3.  Further management of her hypoglycemia, including stress steroids, per 
Endocrinology
4.  Further management of her renal failure per Renal
5.  Consider Neuro evaluation for possible seizure activity
6.  Begin Unasyn 3 grams IV every 12 hours pending above
 
 
 
Consult Acknowledgment
- Thank you for your consult request.

## 2018-03-19 NOTE — CONS- CARDIOLOGY
General Information and HPI
 
Consulting Request
Date of Consult: 03/19/18
Requested By:
CONSUELO Live MD
 
History of Present Illness:
This patient is a 58 year old female with history of hypertension, diabetes, 
renal failure and ankylosing spondylitis on prednisone. The patient was seen 
last evening at about 7PM. At 7AM this morning the patient was found unconscious
and unresponsive and incontinent of urine and stool. Her blood glucose was very 
low at 27. Her insulin pump was stopped two days ago. In the ER the patient 
demonstrated an metabolic acidosis and positive troponin. The patient was given 
dextrose with no improvement in mental status. A chest X-ray showed a left 
basilar infiltrate along with a stable right upper lobe mass.
 
This patient was intubated and 15 to twently minutes later became bradycardic 
and went into asystole. She had chest compressions along with atropine, 
Epinephrine and bicarbonate with return of her rhythm. In consideration of 
likely aspiration pneumonia the patient was started on IV Unasyn. Since the 
patient had been on steroid she was given stress dose steroid swith an 
improvement in her blood pressure. After an episode of ventricular tachycardia 
the patient was also started on Amiodarone.
 
 
 
 
 
 
 
 
Allergies/Medications
Allergies:
Uncoded Allergies:
all pain medications (NAUSEA 10/14/14)
 
Home Med List:
Amlodipine Besylate 10 MG TABLET   1 TAB PO DAILY HEART  (Reported)
Aspirin (Aspirin*) 81 MG TAB.CHEW   1 TAB PO DAILY HEART HEALTH  (Reported)
Escitalopram Oxalate 5 MG TABLET   1 TAB PO DAILY MENTAL HEALTH  (Reported)
Furosemide 40 MG TABLET   1 TAB PO DAILY WATER RETENTION  (Reported)
Insulin Aspart (Novolog) (Unknown Strength) VIAL   (Unknown Dose) AC DIABETES  (
Reported)
Levothyroxine Sodium 50 MCG TABLET   1 TAB PO DAILY AC THYROID  (Reported)
Losartan Potassium 100 MG TABLET   1 TAB PO DAILY HEART  (Reported)
Prednisone 10 MG TABLET   1 TAB PO DAILY STEROID  (Reported)
Sevelamer Carbonate (Renvela) 800 MG TABLET   1 TAB PO AC UNKNOWN  (Reported)
 
 
Review of Systems
Review of Systems:
A review of systems is unobtainable.
 
Past History
 
Travel History
Traveled to [place] past 21 day No
 
Medical History
Blood Transfusion Hx: No
Neurological: NONE
EENT: NONE
Cardiovascular: hypertension
Respiratory: obstructive sleep apnea
Gastrointestinal: NONE
Hepatic: NONE
Renal: chronic kidney disease
Musculoskeletal: arthritis
Psychiatric: NONE
Endocrine: diabetes
Blood Disorders: NONE
Cancer(s): NONE
GYN/Reproductive: NONE
 
Surgical History
Surgical History: L AVF
 
Family History
Relations & Conditions If Any:
MOTHER (Diabetes, hyperlipidemia).
 
 
Psychosocial History
Where Do You Live? Home
Services at Home: None
Smoking Status: Former Smoker
ETOH Use: occasional use
Illicit Drug Use: denies illicit drug use
 
Exam & Diagnostic Data
Vital Signs and I&O
Vital Signs
 
 
Date Time Temp Pulse Resp B/P B/P Pulse O2 O2 Flow FiO2
 
     Mean Ox Delivery Rate 
 
03/19 2000         75
 
03/19 2000      99 Ventilator 85% 
 
03/19 1950       Ventilator 85% 
 
03/19 1825 100.0 126 18 136/64  100 Ventilator 100% 
 
03/19 1758 99.7 128 18 181/68  100 Ventilator  
 
03/19 1727 100.1 126 20 136/64  100 Ventilator 100% 
 
03/19 1705  165 18 199/80  98 Ventilator 100% 
 
03/19 1623         100
 
03/19 1553 97.9 122 18 152/72  100   
 
03/19 1430  111    93   
 
03/19 1419  110 24 119/63  97 BIPAP 60% 
 
03/19 1330      96   
 
03/19 1210 96.9 96 24 128/72  99 BIPAP 60% 
 
03/19 1150  111    97   
 
03/19 1102 97.0 90 24 137/83  99 BIPAP 60% 
 
03/19 1000  88 24 129/78  100 BIPAP 100% 
 
03/19 1000  111    96   
 
03/19 0900      97 Non 9L 
 
       ReBreather  
 
03/19 0845      96 Non 100% 
 
       ReBreather  
 
03/19 0842 97.5 110 28 151/79  87 Room Air Room Air 
 
 
 Intake & Output
 
 
 03/19 1600 03/19 0800 03/19 0000 03/18 1600 03/18 0800 03/18 0000
 
Intake Total 1250     
 
Output Total 3     
 
Balance 1247     
 
       
 
Intake, IV 1250     
 
Output, Urine 3     
 
Patient 160 lb     
 
Weight      
 
Weight Estimated     
 
Measurement      
 
Method      
 
 
 
Physical Exam:
General: WD/WN female; unresponsive and intubated
HEENT: NC/AT, pupils fixed
Neck: no JVD
Heart: RRR w/o murmur
Lungs: clear anteriorly
Abdomen: soft, NT, +ve bowel sounds
Ezxtremities: No edema
 
Assessment/Plan
Assessment/Plan
* This patient likely had a hypoglycemic coma of unkown duration. Her neurologic
status is uncertain at this point in time. 
* This patient has positive cardiac enzymes consistent with a type 2 MI. I am 
not convinced that this is the primary event in her illness. Begin aspirin, IV 
hepatin and statin. Do not begin a beta blocker due to the patient becoming 
bradycardic and going into asystole. Follow cardiac enzymes until they peak. 
Obtain an echocardiogram.
* Continue stress dose steroid and antibiotic therapy.
 
 
Consult Acknowledgment
- Thank you for your consult request.

## 2018-03-19 NOTE — RADIOLOGY REPORT
EXAMINATION:
XR PORTABLE CHEST
 
CLINICAL INFORMATION:
Nausea vomiting, hypoxia, rales. History of end-stage renal disease.
 
COMPARISON:
Chest x-ray 11/16/2017 and CT scan of the chest 01/19/2018.
 
TECHNIQUE:
Portable frontal view of the chest was obtained.
 
FINDINGS:
The lung fields are well-expanded bilaterally. The study redemonstrates a
mass in the right superior lung field medially, which is not significantly
changed compared to prior imaging. There is a new area of patchy opacity at
the left base, which may be consistent with developing consolidation. The
cardiac silhouette is normal. The aortic arch is unfolded. There is a small
right-sided pleural effusion. There are no acute osseous findings.
 
IMPRESSION:
1. There is a stable mass in the right upper lobe, seen on prior imaging.
 
2. Patchy opacification at the left base may be consistent with developing
consolidation.

## 2018-03-19 NOTE — CONS- NEPHROLOGY
General Information and HPI
 
Consulting Request
Date of Consult: 03/19/18
Requested By:
CONSUELO Live MD
 
History of Present Illness:
Ms. Green is a 59 yo F with longstanding DM, CKD 4-5 due to DM and chronic 
intersitial nephritis who is admitted with unresponsiveness and hypoglycemia.  
She has longstanding DM and underwent a renal biopsy (faxed progress notes from 
Dr. Pleitez , nephrologist) which showed chronic interstitial nephritis and DN.  
She was treated with a course of steroids which were tapered and underwent 
creation of a left UA AVF last fall in anticipation of dialysis.  She continued 
to work at [employer] and , in fact, went to work for 1/2 day on Saturday.  She was 
last seen by her  awake last night around 5 PM when she was in bed (she 
often does this due to fatigue).  This morning he found her unresponsive with 
foaming around the mouth. EMT's were called and en route glucose was 24. She was
given D10 and brought to the ED and is now being admitted to the ICU.  Intial Cr
was 6 (baseline 5.5) with a metabolic acidosis.   
 
Allergies/Medications
Allergies:
Uncoded Allergies:
all pain medications (NAUSEA 10/14/14)
 
Home Med List:
Amlodipine Besylate 10 MG TABLET   1 TAB PO DAILY HEART  (Reported)
Aspirin (Aspirin*) 81 MG TAB.CHEW   1 TAB PO DAILY HEART HEALTH  (Reported)
Escitalopram Oxalate 5 MG TABLET   1 TAB PO DAILY MENTAL HEALTH  (Reported)
Furosemide 40 MG TABLET   1 TAB PO DAILY WATER RETENTION  (Reported)
Insulin Aspart (Novolog) (Unknown Strength) VIAL   (Unknown Dose) AC DIABETES  (
Reported)
Levothyroxine Sodium 50 MCG TABLET   1 TAB PO DAILY AC THYROID  (Reported)
Losartan Potassium 100 MG TABLET   1 TAB PO DAILY HEART  (Reported)
Prednisone 10 MG TABLET   1 TAB PO DAILY STEROID  (Reported)
Sevelamer Carbonate (Renvela) 800 MG TABLET   1 TAB PO AC UNKNOWN  (Reported)
 
 
Review of Systems
Review of Systems:
Unable to obtain. pt unresponsive
 
Past History
 
Travel History
Traveled to [place] past 21 day No
 
Medical History
Cardiovascular: hypertension
Respiratory: obstructive sleep apnea
Renal: ESRD on HD
 
Surgical History
Surgical History: L AVF
 
Exam & Diagnostic Data
Vital Signs and I&O
 
Vital Signs
 
 
Date Time Temp Pulse Resp B/P B/P Pulse O2 O2 Flow FiO2
 
     Mean Ox Delivery Rate 
 
03/19 1430  111    93   
 
03/19 1419  110 24 119/63  97 BIPAP 60% 
 
03/19 1330      96   
 
03/19 1210 96.9 96 24 128/72  99 BIPAP 60% 
 
03/19 1150  111    97   
 
03/19 1102 97.0 90 24 137/83  99 BIPAP 60% 
 
03/19 1000  88 24 129/78  100 BIPAP 100% 
 
03/19 1000  111    96   
 
03/19 0900      97 Non 9L 
 
       ReBreather  
 
03/19 0845      96 Non 100% 
 
       ReBreather  
 
03/19 0842 97.5 110 28 151/79  87 Room Air Room Air 
 
F intubated in ED.  
Nurses taking BP but low
Skin neg rash
Eyes anicteric
ENT intubated
Lungs clear to A
Cor RRR
Abd soft N/T
Ext tr edema  L UA AVF good bruit
Neuro unresponsive
 
 
 
Results
Pertinent Lab Results:
 Laboratory Tests
 
 
 03/19 03/19 03/19
 
 1510 1307 1140
 
Blood Gas   
 
  pH (7.35 - 7.45 PH)   7.29 *L
 
  pCO2 (35 - 45 TORR)   32  L
 
  pO2 (80 - 100 TORR)   238  H
 
  HCO3 (21 - 28 MEQ/L)   15  L
 
  ABG O2 Sat (Measured) (>96.0 %)   98.0
 
  Carboxyhemoglobin (1.5 - 5.0 %)   0.3  L
 
  O2 Concentration %   100%
 
  Respiration Rate (BPM)   24
 
  O2 Delivery Method   BIPAP
 
  Vent Mode   ST
 
  Expiratory Pressure (CM H2O P)   6
 
  Inspiratory Pressure (CM H2O P)   16
 
Chemistry   
 
  Lactic Acid (0.7 - 2.1 mmol/L)  2.0 
 
  Troponin I (< 0.11 ng/ml) 3.04 *H  
 
Miscellaneous   
 
  Phlebotomy Draw Site   RIGHT RADIAL
 
 
 
 
 03/19 03/19
 
 1000 0900
 
Blood Gas  
 
  pH (7.35 - 7.45 PH)  7.23 *L
 
  pCO2 (35 - 45 TORR)  38
 
  pO2 (80 - 100 TORR)  92
 
  HCO3 (21 - 28 MEQ/L)  16  L
 
  ABG O2 Sat (Measured) (>96.0 %)  94.0  L
 
  Carboxyhemoglobin (1.5 - 5.0 %)  0.5  L
 
  O2 Concentration %  100%
 
  O2 Delivery Method  NRB
 
Chemistry  
 
  Sodium (137 - 145 mmol/L) 146  H 
 
  Potassium (3.5 - 5.1 mmol/L) 4.7 
 
  Chloride (98 - 107 mmol/L) 111  H 
 
  Carbon Dioxide (22 - 30 mmol/L) 16  L 
 
  Anion Gap (5 - 16) 18  H 
 
  BUN (7 - 17 mg/dL) 95  H 
 
  Creatinine (0.5 - 1.0 mg/dL) 6.2 *H 
 
  Estimated GFR (>60 ml/min) 7  L 
 
  BUN/Creatinine Ratio (7 - 25 %) 15.3 
 
  Glucose (65 - 99 mg/dL) 86 
 
  Hemoglobin A1c (4.2 - 5.8 %) 8.8  H 
 
  Insulin Level (3.0 - 25.0 mIU/mL) Pending 
 
  Lactic Acid (0.7 - 2.1 mmol/L) 2.3  H 
 
  Calcium (8.4 - 10.2 mg/dL) 8.2  L 
 
  Phosphorus (2.5 - 4.5 mg/dL) 8.8  H 
 
  Magnesium (1.6 - 2.3 mg/dL) 1.8 
 
  Total Bilirubin (0.2 - 1.3 mg/dL) 0.4 
 
  AST (14 - 36 U/L) 29 
 
  ALT (9 - 52 U/L) 28 
 
  Alkaline Phosphatase (<127 U/L) 48 
 
  Creatine Kinase (30 - 135 U/L) 248  H 
 
  Troponin I (< 0.11 ng/ml) 2.31 *H 
 
  Total Protein (6.3 - 8.2 g/dL) 4.9  L 
 
  Albumin (3.5 - 5.0 g/dL) 2.7  L 
 
  Globulin (1.9 - 4.2 gm/dL) 2.2 
 
  Albumin/Globulin Ratio (1.1 - 2.2 %) 1.2 
 
  TSH (0.270 - 4.200 uIU/mL) 9.510  H 
 
  Free T4 (0.78 - 2.44 ng/dL) 0.89 
 
  Prolactin (3.0 - 18.6 ng/mL) 29.0  H 
 
  Cortisol AM Sample (4.46 - 22.7 ug/dL) Pending 
 
Hematology  
 
  CBC w Diff MAN DIFF ORDERED 
 
  WBC (4.8 - 10.8 /CUMM) 16.2  H 
 
  RBC (4.20 - 5.40 /CUMM) 4.33 
 
  Hgb (12.0 - 16.0 G/DL) 11.4  L 
 
  Hct (37 - 47 %) 34.8  L 
 
  MCV (81.0 - 99.0 FL) 80.5  L 
 
  MCH (27.0 - 31.0 PG) 26.3  L 
 
  MCHC (33.0 - 37.0 G/DL) 32.7  L 
 
  RDW (11.5 - 14.5 %) 18.6  H 
 
  Plt Count (130 - 400 /CUMM) 405  H 
 
  MPV (7.4 - 10.4 FL) 6.9  L 
 
  Gran % (42.2 - 75.2 %) 89.2  H 
 
  Lymphocytes % (20.5 - 51.1 %) 9.1  L 
 
  Monocytes % (1.7 - 9.3 %) 0.9  L 
 
  Eosinophils % (0 - 5 %) 0.6 
 
  Basophils % (0.0 - 2.0 %) 0.2 
 
  Absolute Granulocytes (1.4 - 6.5 /CUMM) 14.5  H 
 
  Segmented Neutrophils (42.2 - 75.2 %) 73 
 
  Band Neutrophils (0.0 - 5.0 %) 13  H 
 
  Absolute Lymphocytes (1.2 - 3.4 /CUMM) 1.5 
 
  Lymphocytes (20.5 - 51.1 %) 11  L 
 
  Monocytes (1.7 - 9.3 %) 3 
 
  Absolute Monocytes (0.10 - 0.60 /CUMM) 0.1 
 
  Absolute Eosinophils (0.0 - 0.7 /CUMM) 0.1 
 
  Absolute Basophils (0.0 - 0.2 /CUMM) 0 
 
  Platelet Estimate (ADEQUATE) INCREASED 
 
  Normocytic RBCs VERIFIED 
 
  Normochromic RBCs VERIFIED 
 
  Poikilocytosis FEW 
 
  Lilliam Cells FEW 
 
Miscellaneous  
 
  Phlebotomy Draw Site  RIGHT BRACHIAL
 
 
 
 
Assessment/Plan
 
Assessment/Recommendations
Assessment:
CKD V predominantly due to diabetic nephropathy with a component of SHAMEKA. Her 
 says she often doesn't eat due to fatigue and these likely represent 
early uremic symptoms.   Cr on admission here is not that different than 
outpatient creatinine but this likely represents a GFR < 10 cc/min.  Her 
unresponsiveness is not related to uremia although I suspect her fatigue and 
appetite change are.   She does not require emergent dialysis and given her 
unstable state at this point dialysis would be more likely to cause harm in the 
immediate setting (vascular instability).  I did, however, discuss with her 
 that I suspect she will start dialysis this admission (next few days) 
given baseline low GFR and increased catabolic load.
 
As discussed with the medical team, she has an anion gap metabolic acidosis
(Winter's formula  (1.5 x 16 + 8 +/-  2 = 32 +/- 2. CO2 is 32 so she has 
apporpriate resp compensation).  
She does not have metabolic alkalosis (A.G. - nl A.G (12 but may be higher given
CKD) = 18-12 = 6.  This 6 is representative of the mEq of organic anions (
lactate/ketones etc) and are considered bicarb equivalents. if we add this to 
her bicarb 16 her total bicarbonate is 22 - prob a bit lower since her nl A.G is
prob higher than 12).  This means she does not have a met alkalosis (easier to 
understand and more physiologic than traditional delta/delta taught).
Could correct with some IV bicarbonate (not critical) typically D5 with 150 Na 
Bicarbonate).  
 
Given hypotension and URI symptoms, consider sepsis. Send BC and antibiotics as 
recommended by Dr Lin. 
 
Correct hypoglycemia as you are doing.   
 
Please send Hepatitis B S Ag/Ab with next bloodwork (not urgent) as this will be
needed if dialysis is needed.
Thanks will follow.
Juan Denis MD.
 
 
Recommendations:
.

## 2018-03-19 NOTE — CONS- NEUROLOGY
General Information and HPI
 
Consulting Request
Date of Consult: 03/19/18
Requested By:
Maria T BARLOW,CONSUELO Howard
 
Source of Information: old records
History of Present Illness:
58-year-old female admitted to hospital unresponsive
She has long history of diabetes and nephritis
She has a history of fatigue and went to bed early night before her admission
On the morning  found her unresponsive foaming at the mouth
EMT was called and a glucose of 24 was found
Patient was given D10
In the ED she had a cardiac arrest; she was given atropine and succinocholine 
and atropine
She is presently intubated
 
Allergies/Medications
Allergies:
Uncoded Allergies:
all pain medications (NAUSEA 10/14/14)
 
Home Med List:
Amlodipine Besylate 10 MG TABLET   1 TAB PO DAILY HEART  (Reported)
Aspirin (Aspirin*) 81 MG TAB.CHEW   1 TAB PO DAILY HEART HEALTH  (Reported)
Escitalopram Oxalate 5 MG TABLET   1 TAB PO DAILY MENTAL HEALTH  (Reported)
Furosemide 40 MG TABLET   1 TAB PO DAILY WATER RETENTION  (Reported)
Insulin Aspart (Novolog) (Unknown Strength) VIAL   (Unknown Dose) AC DIABETES  (
Reported)
Levothyroxine Sodium 50 MCG TABLET   1 TAB PO DAILY AC THYROID  (Reported)
Losartan Potassium 100 MG TABLET   1 TAB PO DAILY HEART  (Reported)
Prednisone 10 MG TABLET   1 TAB PO DAILY STEROID  (Reported)
Sevelamer Carbonate (Renvela) 800 MG TABLET   1 TAB PO AC UNKNOWN  (Reported)
 
Current Medications:
 Current Medications
 
 
  Sig/Susan Start time  Last
 
Medication Dose Route Stop Time Status Admin
 
Acetaminophen 1,000 MG Q6P PRN 03/19 1445 AC 
 
  IV   
 
Ampicillin Sodium/ 1,500 MG Q6 03/19 1800 CAN 
 
Sulbactam Sodium  IV   
 
Sodium Chloride 100 ML    
 
Ampicillin Sodium/ 3,000 MG Q12 03/19 1730 AC 
 
Sulbactam Sodium  IV   
 
Sodium Chloride 100 ML    
 
Aspirin 300 MG ONCE ONE 03/19 1145 DC 03/19
 
  MN 03/19 1146  1145
 
Atropine Sulfate 0 .STK-MED ONE 03/19 1630 DC 
 
  .ROUTE   
 
Azithromycin 500 MG ONCE ONE 03/19 1000 DC 03/19
 
Dextrose/Water 250 ML IV 03/19 1059  1053
 
Ceftriaxone Sodium 0 .STK-MED ONE 03/19 1030 DC 
 
  .ROUTE   
 
Ceftriaxone Sodium 1,000 MG ONCE ONE 03/19 1000 DC 03/19
 
  IV 03/19 1001  1053
 
Dextrose/Sodium  1,000 ML Q10H 03/19 1145 DC 03/19
 
Chloride  IV   1248
 
Dextrose/Sodium  1,000 ML Q10H 03/19 1100 DC 03/19
 
Chloride  IV   1100
 
Dextrose/Water 1,000 ML Q20H 03/19 1630 DC 03/19
 
  IV   1726
 
Dextrose/Water 250 ML .STK-MED ONE 03/19 1156 DC 
 
  IV   
 
Etomidate 20 MG ONCE ONE 03/19 1545 DC 03/19
 
  IV 03/19 1546  1619
 
Etomidate 0 .STK-MED ONE 03/19 1545 DC 
 
  IV   
 
Glucagon 0 .STK-MED ONE 03/19 1156 DC 
 
  .ROUTE   
 
Heparin Sodium  0 .STK-MED ONE 03/19 1756 DC 
 
(Porcine)  .ROUTE   
 
Heparin Sodium  4,000 UNIT ONCE ONE 03/19 1730 DC 
 
(Porcine)  IV 03/19 1731  
 
Heparin Sodium  25,000 UNIT Q24H 03/19 1730 AC 
 
(Porcine)  IV   
 
Sodium Chloride 500 ML    
 
Hydrocortisone  50 MG Q12 03/20 1000 DC 
 
Sodium Succinate  IV   
 
Hydrocortisone  50 MG DAILY 03/20 1000 UNVr 
 
Sodium Succinate  IV   
 
Hydrocortisone  100 MG ONE ONE 03/19 1615 DC 03/19
 
Sodium Succinate  IV 03/19 1616  1709
 
Insulin Human Regular 0 Q6 03/19 1440 AC 03/19
 
  SC   1813
 
Levothyroxine Sodium 25 MCG DAILY 03/20 1000 AC 
 
  IV   
 
Lorazepam 0 .STK-MED ONE 03/19 1604 DC 
 
  .ROUTE   
 
Norepinephrine 8 MG ONCE ONE 03/19 1830 DC 
 
Sodium Chloride 250 ML IV 03/19 1831  
 
Norepinephrine 0 .STK-MED ONE 03/19 1825 DC 
 
  IV   
 
Norepinephrine 0 .STK-MED ONE 03/19 1654 DC 
 
  IV   
 
Norepinephrine 8 MG ONCE ONE 03/19 1645 DC 03/19
 
Sodium Chloride 250 ML IV 03/19 1646  1748
 
Pantoprazole Sodium 40 MG DAILY 03/19 1730 AC 
 
  IV   
 
Sodium Bicarbonate 50 MEQ ONCE ONE 03/19 1900 DC 
 
  IV 03/19 1901  
 
Sodium Bicarbonate 150 MEQ Q10H 03/19 1900 AC 
 
Dextrose/Water 1,000 ML IV   
 
Sodium Bicarbonate 100 MEQ CONTINOUS INFUSION 03/19 1600 DC 
 
Dextrose/Water 1,000 ML IV   
 
Sodium Chloride 500 ML BOLUS ONE 03/19 1615 DC 03/19
 
  IV 03/19 1714  1617
 
Succinylcholine  100 MG ONCE ONE 03/19 1545 DC 03/19
 
Chloride  IV 03/19 1546  1619
 
 
 
 
Review of Systems
Review of Systems:
Unable to obtain at present since patient is in coma
 
Past History
 
Travel History
Traveled to [place] past 21 day No
 
Medical History
Cardiovascular: hypertension
Respiratory: obstructive sleep apnea
Renal: chronic kidney disease
Musculoskeletal: arthritis
Endocrine: diabetes
 
Surgical History
Surgical History: L AVF
 
Family History
Relations & Conditions If Any:
MOTHER (Diabetes, hyperlipidemia).
 
 
Psychosocial History
Smoking Status: Former Smoker
ETOH Use: occasional use
Illicit Drug Use: denies illicit drug use
 
Exam & Diagnostic Data
Vital Signs and I&O
Vital Signs
 
 
Date Time Temp Pulse Resp B/P B/P Pulse O2 O2 Flow FiO2
 
     Mean Ox Delivery Rate 
 
03/19 1950       Ventilator 85% 
 
03/19 1825 100.0 126 18 136/64  100 Ventilator 100% 
 
03/19 1758 99.7 128 18 181/68  100 Ventilator  
 
03/19 1727 100.1 126 20 136/64  100 Ventilator 100% 
 
03/19 1623         100
 
03/19 1553 97.9 122 18 152/72  100   
 
03/19 1430  111    93   
 
03/19 1419  110 24 119/63  97 BIPAP 60% 
 
03/19 1330      96   
 
03/19 1210 96.9 96 24 128/72  99 BIPAP 60% 
 
03/19 1150  111    97   
 
03/19 1102 97.0 90 24 137/83  99 BIPAP 60% 
 
03/19 1000  88 24 129/78  100 BIPAP 100% 
 
03/19 1000  111    96   
 
03/19 0900      97 Non 9L 
 
       ReBreather  
 
03/19 0845      96 Non 100% 
 
       ReBreather  
 
03/19 0842 97.5 110 28 151/79  87 Room Air Room Air 
 
 
 Intake & Output
 
 
 03/19 1600 03/19 0800 03/19 0000
 
Intake Total 1250  
 
Output Total 3  
 
Balance 1247  
 
    
 
Intake, IV 1250  
 
Output, Urine 3  
 
Patient 160 lb  
 
Weight   
 
Weight Estimated  
 
Measurement   
 
Method   
 
 
 
Physical Exam:
On cooling blanket and on pressors
No response to noxious stimuli
Pupils not reactive and midsize
No doll's eyes
No corneal response
No gag response
No not breathing above respirator setting
Flaccid upper and lower extremities
No movement upper and lower extremities
Deep tendon reflexes hypoactive
Coordinative functions cannot be assessed
Last 48 Hours of Lab Results:
 Laboratory Tests
 
 
 03/19 03/19 03/19 03/19 03/19 1931 1931 1710 1510 1307
 
Blood Gas     
 
  pH (7.35 - 7.45 PH)   7.06 *L  
 
  pCO2 (35 - 45 TORR)   42  
 
  pO2 (80 - 100 TORR)   167  H  
 
  HCO3 (21 - 28 MEQ/L)   12  L  
 
  ABG O2 Sat (Measured) (>96.0 %)   97.0  
 
  P-50 (Temp Corrected)   N  
 
  Carboxyhemoglobin (1.5 - 5.0 %)   0.3  L  
 
  O2 Concentration %   100%  
 
  Temperature (97.0 - 100.0 FARH)   97.5  
 
  Respiration Rate (BPM)   26  
 
  O2 Delivery Method   ESPRIT VENT  
 
  Vent Mode   AC  
 
  Expiratory Pressure (CMH2O/P)   5  
 
  Tidal Volume (CC)   500  
 
Chemistry     
 
  Sodium  Pending   
 
  Potassium  Pending   
 
  Chloride  Pending   
 
  Carbon Dioxide  Pending   
 
  Anion Gap  Pending   
 
  BUN  Pending   
 
  Creatinine  Pending   
 
  Glucose  Pending   
 
  Lactic Acid (0.7 - 2.1 mmol/L) Pending    2.0
 
  Calcium  Pending   
 
  Phosphorus  Pending   
 
  Magnesium  Pending   
 
  Total Bilirubin  Pending   
 
  AST  Pending   
 
  ALT  Pending   
 
  Troponin I (< 0.11 ng/ml)    3.04 *H 
 
  Albumin  Pending   
 
Coagulation     
 
  PT  Pending   
 
  INR  Pending   
 
  APTT  Pending   
 
Miscellaneous     
 
  Phlebotomy Draw Site   RIGHT BRACHIAL  
 
 
 
 
 03/19 03/19
 
 1140 1000
 
Blood Gas  
 
  pH (7.35 - 7.45 PH) 7.29 *L 
 
  pCO2 (35 - 45 TORR) 32  L 
 
  pO2 (80 - 100 TORR) 238  H 
 
  HCO3 (21 - 28 MEQ/L) 15  L 
 
  ABG O2 Sat (Measured) (>96.0 %) 98.0 
 
  Carboxyhemoglobin (1.5 - 5.0 %) 0.3  L 
 
  O2 Concentration % 100% 
 
  Respiration Rate (BPM) 24 
 
  O2 Delivery Method BIPAP 
 
  Vent Mode ST 
 
  Expiratory Pressure (CM H2O P) 6 
 
  Inspiratory Pressure (CM H2O P) 16 
 
Chemistry  
 
  Sodium (137 - 145 mmol/L)  146  H
 
  Potassium (3.5 - 5.1 mmol/L)  4.7
 
  Chloride (98 - 107 mmol/L)  111  H
 
  Carbon Dioxide (22 - 30 mmol/L)  16  L
 
  Anion Gap (5 - 16)  18  H
 
  BUN (7 - 17 mg/dL)  95  H
 
  Creatinine (0.5 - 1.0 mg/dL)  6.2 *H
 
  Estimated GFR (>60 ml/min)  7  L
 
  BUN/Creatinine Ratio (7 - 25 %)  15.3
 
  Glucose (65 - 99 mg/dL)  86
 
  Hemoglobin A1c (4.2 - 5.8 %)  8.8  H
 
  Insulin Level (3.0 - 25.0 mIU/mL)  31.0  H
 
  Lactic Acid (0.7 - 2.1 mmol/L)  2.3  H
 
  Calcium (8.4 - 10.2 mg/dL)  8.2  L
 
  Phosphorus (2.5 - 4.5 mg/dL)  8.8  H
 
  Magnesium (1.6 - 2.3 mg/dL)  1.8
 
  Total Bilirubin (0.2 - 1.3 mg/dL)  0.4
 
  AST (14 - 36 U/L)  29
 
  ALT (9 - 52 U/L)  28
 
  Alkaline Phosphatase (<127 U/L)  48
 
  Creatine Kinase (30 - 135 U/L)  248  H
 
  Troponin I (< 0.11 ng/ml)  2.31 *H
 
  Total Protein (6.3 - 8.2 g/dL)  4.9  L
 
  Albumin (3.5 - 5.0 g/dL)  2.7  L
 
  Globulin (1.9 - 4.2 gm/dL)  2.2
 
  Albumin/Globulin Ratio (1.1 - 2.2 %)  1.2
 
  TSH (0.270 - 4.200 uIU/mL)  9.510  H
 
  Free T4 (0.78 - 2.44 ng/dL)  0.89
 
  Prolactin (3.0 - 18.6 ng/mL)  29.0  H
 
  Cortisol AM Sample (4.46 - 22.7 ug/dL)  26.5  H
 
Hematology  
 
  CBC w Diff  MAN DIFF ORDERED
 
  WBC (4.8 - 10.8 /CUMM)  16.2  H
 
  RBC (4.20 - 5.40 /CUMM)  4.33
 
  Hgb (12.0 - 16.0 G/DL)  11.4  L
 
  Hct (37 - 47 %)  34.8  L
 
  MCV (81.0 - 99.0 FL)  80.5  L
 
  MCH (27.0 - 31.0 PG)  26.3  L
 
  MCHC (33.0 - 37.0 G/DL)  32.7  L
 
  RDW (11.5 - 14.5 %)  18.6  H
 
  Plt Count (130 - 400 /CUMM)  405  H
 
  MPV (7.4 - 10.4 FL)  6.9  L
 
  Gran % (42.2 - 75.2 %)  89.2  H
 
  Lymphocytes % (20.5 - 51.1 %)  9.1  L
 
  Monocytes % (1.7 - 9.3 %)  0.9  L
 
  Eosinophils % (0 - 5 %)  0.6
 
  Basophils % (0.0 - 2.0 %)  0.2
 
  Absolute Granulocytes (1.4 - 6.5 /CUMM)  14.5  H
 
  Segmented Neutrophils (42.2 - 75.2 %)  73
 
  Band Neutrophils (0.0 - 5.0 %)  13  H
 
  Absolute Lymphocytes (1.2 - 3.4 /CUMM)  1.5
 
  Lymphocytes (20.5 - 51.1 %)  11  L
 
  Monocytes (1.7 - 9.3 %)  3
 
  Absolute Monocytes (0.10 - 0.60 /CUMM)  0.1
 
  Absolute Eosinophils (0.0 - 0.7 /CUMM)  0.1
 
  Absolute Basophils (0.0 - 0.2 /CUMM)  0
 
  Platelet Estimate (ADEQUATE)  INCREASED
 
  Normocytic RBCs  VERIFIED
 
  Normochromic RBCs  VERIFIED
 
  Poikilocytosis  FEW
 
  Lilliam Cells  FEW
 
Miscellaneous  
 
  Phlebotomy Draw Site RIGHT RADIAL 
 
 
 
 
 03/19
 
 0900
 
Blood Gas 
 
  pH (7.35 - 7.45 PH) 7.23 *L
 
  pCO2 (35 - 45 TORR) 38
 
  pO2 (80 - 100 TORR) 92
 
  HCO3 (21 - 28 MEQ/L) 16  L
 
  ABG O2 Sat (Measured) (>96.0 %) 94.0  L
 
  Carboxyhemoglobin (1.5 - 5.0 %) 0.5  L
 
  O2 Concentration % 100%
 
  O2 Delivery Method NRB
 
Miscellaneous 
 
  Phlebotomy Draw Site RIGHT BRACHIAL
 
 
 
Imaging/Other Studies:
 
CT brain IMPRESSION:
Nondiagnostic CT of the head secondary to significant streak/motion artifact
despite repeating the acquisition. I cannot exclude infarcts nor hemorrhage
on this study.
 
Assessment/Plan
Assessment:
Coma
Likely secondary to severe hypoglycemia
 
Unclear as to how much medication such as succinylcholine are interfering with 
the neurologic examination; half-life should be relatively short and in 
approximately 24 hours a better assessment can be made
 
Nonetheless given history prognosis appears very poor
Recommendations:
Reevaluation within 1-2 days
 
 
Consult Acknowledgment
- Thank you for your consult request.

## 2018-03-19 NOTE — ADMISSION CERTIFICATION
Admission Certification
 
Certification Statement
- As attending physician, I certify that at the time of
- admission, based on clinical presentation, severity of
- symptoms, need for further diagnostic testing and
- therapeutic interventions, and risk of adverse outcomes
- without in-hospital treatment, in my clinical assessment,
- this patient requires an acute hospital stay for a minimum
- of two nights or longer. I have also considered psychsocial
- factors such as support system, advanced age, financial
- issues, cognitive issues, and failed out-patient treatments,
- past re-admission history, safety of patient, and lack of
- compliance as applicable.
Specific rationale supporting this admission is:
The patient presented with acute mental status change, respiratory failure, and 
aspiration pneumonia.  She arrested in the ED approximately 15 minutes post 
intubation.  She requires mechanical ventilation and pressor support.  She 
remains critically ill and will continue to need ICU monitoring and treatment.

## 2018-03-19 NOTE — HISTORY & PHYSICAL
Prabhakar Fernandez 03/19/18 1435:
General Information and HPI
MD Statement:
I have seen and personally examined JINA RASCON and documented this H&P.
 
The patient is a 58 year old F who presented with a patient stated chief 
complaint of [unresponsive].
 
Source of Information: family, old records, EMS
Exam Limitations: unable to give history, not alert/orientated
History of Present Illness:
This is 58-year-old female with a medical history of stage 5 CKD(nephrotic 
proteinuria, diabetic nephropathy and retinopathy) with left AV fistula placed 
couple month ago(still making urine), currently not on hemodialysis, has only 
one kidney since childhood.  Chronic anemia, hypertension, type 1 diabetes on 
insulin pump, hypothyroidism, ankylosing spondylitis on chronic prednisone, 
depression, GERD, right lung mass according to the family the biopsy came back 
as a benign, hyperparathyroidism due to renal insufficiency, chronic pain, 
obstructive sleep apnea on CPAP.  Presented to the emergency department via EMS 
due to unresponsiveness.  Her  stated that yesterday afternoon was laying
down in the bed which is not her usual.  He stated that the last time he saw her
alert, oriented and awake was around 7 PM.  He stated that when he went to see 
her at 7 AM he found unresponsive, and there is forming/fluid covering her face 
and mouth and he noticed that she had a bowel and urine incontinence so they 
contacted EMS and brought her to the hospital, he stated that her Accu-Chek was 
in the 27(the family stated that her insulin pump was discontinue the day before
yesterday) so she received some dextrose D10 on the way to the hospital, also 
she found to be tachycardic with low oxygen this is associated with airway 
congestion.  The patient arrived to the ED patient was placed on BiPAP due to 
metabolic acidosis ABG, the patient remained unresponsive also she had troponin 
of 2.3 with no significant EKG changes, patient this is her first time to 
present to the ED so we did have previous record for comparison.  Also she 
received a dose of IV ceftriaxone, azithromycin, rectal aspirin, started on D5 
half-normal saline.  Chest x-ray showed a patchy opacity at the left base which 
was suggestive of developing consolidation, also shows stable mass in the right 
upper lobe.  Head CAT scan was Nondiagnostic CT of the head secondary to 
significant streak/motion artifact
despite repeating the acquisition.  We consulted the following specialist: 
Nephrology, endocrinology, cardiology, neurology, infectious disease.  Patient 
was intubated around 4:10 PM, reason for intubation was to secure her airways as
he has a lot of oral secretions. 
 
After discussion with the nephrologist he recommended to start the patient on 
continuous infusion of sodium bicarbonate and there is no need for any emergent 
dialysis for now.
 
After discussion with infectious disease specialist he recommended to cover her 
for the possibility of aspiration pneumonia with IV Unasyn and panculture the 
patient.
 
After discussion with endocrinologist as the patient has insulin pump should 
recommend hydrocortisone stress dose 100 mg IV as the blood pressure starting to
decrease around 4:20 PM, stat order placed.  And she recommended D10 at 50 mL/h 
and Accu-Chek every 30 minutes and she preferred Accu-Chek to be via peripheral 
line, also she recommended to check an insulin level, cortisol level TSH and 
free T4. 
 
Around 4:35 PM the patient starts become bradycardic, direct verbal order of 
pacer and atropine was giving before these arrived patient become in asystole 
and cardiac arrest code was initiated, we contact the cardiologist stat, patient
received around 2 amp of atropine and 1 amp of epinephrine, bicarbonate, 40 mg 
of vasopressin, dopamine drip and Levophed drip started after that has a 
systolic blood pressure of more than 180 and good peripheral pulses. also she 
received a dose of 150 amiodarone due to heart is of 220 with a V. tach s/p 
cardiac arrest code, third troponin came back 3.04. 
 
After discussion with the cardiologist recommended that the patient on IV 
heparin, keep trending her troponin and EKG. 
 
Right femoral central line placed by the ED staff, also the patient received the
100 mg of hydrocortisone. 
 
Allergies/Medications
Allergies:
Uncoded Allergies:
all pain medications (NAUSEA 10/14/14)
 
Home Med list
Amlodipine Besylate 10 MG TABLET   1 TAB PO DAILY HEART  (Reported)
Aspirin (Aspirin*) 81 MG TAB.CHEW   1 TAB PO DAILY HEART HEALTH  (Reported)
Escitalopram Oxalate 5 MG TABLET   1 TAB PO DAILY MENTAL HEALTH  (Reported)
Furosemide 40 MG TABLET   1 TAB PO DAILY WATER RETENTION  (Reported)
Insulin Aspart (Novolog) (Unknown Strength) VIAL   (Unknown Dose) AC DIABETES  (
Reported)
Levothyroxine Sodium 50 MCG TABLET   1 TAB PO DAILY AC THYROID  (Reported)
Losartan Potassium 100 MG TABLET   1 TAB PO DAILY HEART  (Reported)
Prednisone 10 MG TABLET   1 TAB PO DAILY STEROID  (Reported)
Sevelamer Carbonate (Renvela) 800 MG TABLET   1 TAB PO AC UNKNOWN  (Reported)
 
 
Past History
 
Travel History
Traveled to [place] past 21 day No
 
Medical History
Cardiovascular: hypertension
Respiratory: obstructive sleep apnea
Renal: ESRD on HD
 
Surgical History
Surgical History: L AVF
 
Past Family/Social History
 
Family History
Relations & Conditions if any
MOTHER (Diabetes, hyperlipidemia).
 
 
Psychosocial History
Smoking Status: Former Smoker
ETOH Use: occasional use
Illicit Drug Use: denies illicit drug use
 
Review of Systems
 
Review of Systems
Constitutional:
Reports: see HPI. 
 
Exam & Diagnostic Data
Last 24 Hrs of Vital Signs/I&O
 Vital Signs
 
 
Date Time Temp Pulse Resp B/P B/P Pulse O2 O2 Flow FiO2
 
     Mean Ox Delivery Rate 
 
03/19 1727 100.1 126 20 136/64  100 Ventilator 100% 
 
03/19 1623         100
 
03/19 1430  111    93   
 
03/19 1419  110 24 119/63  97 BIPAP 60% 
 
03/19 1330      96   
 
03/19 1210 96.9 96 24 128/72  99 BIPAP 60% 
 
03/19 1150  111    97   
 
03/19 1102 97.0 90 24 137/83  99 BIPAP 60% 
 
03/19 1000  88 24 129/78  100 BIPAP 100% 
 
03/19 1000  111    96   
 
03/19 0900      97 Non 9L 
 
       ReBreather  
 
03/19 0845      96 Non 100% 
 
       ReBreather  
 
03/19 0842 97.5 110 28 151/79  87 Room Air Room Air 
 
 
 Intake & Output
 
 
 03/19 1600 03/19 0800 03/19 0000
 
Intake Total 1250  
 
Output Total 3  
 
Balance 1247  
 
    
 
Intake, IV 1250  
 
Output, Urine 3  
 
Patient 160 lb  
 
Weight   
 
Weight Estimated  
 
Measurement   
 
Method   
 
 
 
 
Physical Exam
General Appearance initially was on BiPAP then intubated
Skin Temp/Moisture Exam: Cool/Dry
HEENT on initial evaluation she has horizontal nystagmus. after the cardiac 
arrest code the pupils become fixed and non reactive to light .
Neck Supple
Cardiovascular Normal S1, Normal S2
Lungs decrease in air entry bibasilar, rhonchi, noisy breathing
Abdomen Normal Bowel Sounds, Soft, obese
Extremities trace bilateral edema
Last 24 Hrs of Labs/Sid:
 Laboratory Tests
 
03/19/18 1710:
pH 7.06 *L, pCO2 42, pO2 167  H, HCO3 12  L, ABG O2 Sat (Measured) 97.0, P-50 (
Temp Corrected) N, Carboxyhemoglobin 0.3  L, O2 Concentration % 100%, 
Temperature 97.5, Respiration Rate 26, O2 Delivery Method ESPRIT VENT, Vent Mode
AC, Expiratory Pressure 5, Tidal Volume 500, Phlebotomy Draw Site RIGHT BRACHIAL
 
03/19/18 1510:
Troponin I 3.04 *H
 
03/19/18 1307:
Lactic Acid 2.0
 
03/19/18 1140:
pH 7.29 *L, pCO2 32  L, pO2 238  H, HCO3 15  L, ABG O2 Sat (Measured) 98.0, 
Carboxyhemoglobin 0.3  L, O2 Concentration % 100%, Respiration Rate 24, O2 
Delivery Method BIPAP, Vent Mode ST, Expiratory Pressure 6, Inspiratory Pressure
16, Phlebotomy Draw Site RIGHT RADIAL
 
03/19/18 1000:
Anion Gap 18  H, Estimated GFR 7  L, BUN/Creatinine Ratio 15.3, Glucose 86, 
Hemoglobin A1c 8.8  H, Insulin Level 31.0  H, Lactic Acid 2.3  H, Calcium 8.2  L
, Phosphorus 8.8  H, Magnesium 1.8, Total Bilirubin 0.4, AST 29, ALT 28, 
Alkaline Phosphatase 48, Creatine Kinase 248  H, Troponin I 2.31 *H, Total 
Protein 4.9  L, Albumin 2.7  L, Globulin 2.2, Albumin/Globulin Ratio 1.2, TSH 
9.510  H, Free T4 0.89, Prolactin 29.0  H, Cortisol AM Sample 26.5  H, CBC w 
Diff MAN DIFF ORDERED, RBC 4.33, MCV 80.5  L, MCH 26.3  L, MCHC 32.7  L, RDW 
18.6  H, MPV 6.9  L, Gran % 89.2  H, Lymphocytes % 9.1  L, Monocytes % 0.9  L, 
Eosinophils % 0.6, Basophils % 0.2, Absolute Granulocytes 14.5  H, Segmented 
Neutrophils 73, Band Neutrophils 13  H, Absolute Lymphocytes 1.5, Lymphocytes 11
 L, Monocytes 3, Absolute Monocytes 0.1, Absolute Eosinophils 0.1, Absolute 
Basophils 0, Platelet Estimate INCREASED, Normocytic RBCs VERIFIED, Normochromic
RBCs VERIFIED, Poikilocytosis FEW, Lilliam Cells FEW
 
03/19/18 0900:
pH 7.23 *L, pCO2 38, pO2 92, HCO3 16  L, ABG O2 Sat (Measured) 94.0  L, 
Carboxyhemoglobin 0.5  L, O2 Concentration % 100%, O2 Delivery Method NRB, 
Phlebotomy Draw Site RIGHT BRACHIAL
 Microbiology
03/19 1444  LOWER RESP: Respiratory Culture - ORD
03/19 1444  LOWER RESP: Gram Stain - ORD
03/19 1030  NASOPHARYN: Influenza Virus A & B Rapid Smear - COMP
03/19 1000  BLOOD: Blood Culture - RECD
03/19 0922  URINE ROUT: Urine Culture - ORD
03/19 0832  BLOOD: Blood Culture - ORD
 
 
 
Diagnostic Data
CXR Results
EXAMINATION:
XR PORTABLE CHEST
 
CLINICAL INFORMATION:
Nausea vomiting, hypoxia, rales. History of end-stage renal disease.
 
COMPARISON:
Chest x-ray 11/16/2017 and CT scan of the chest 01/19/2018.
 
TECHNIQUE:
Portable frontal view of the chest was obtained.
 
FINDINGS:
The lung fields are well-expanded bilaterally. The study redemonstrates a
mass in the right superior lung field medially, which is not significantly
changed compared to prior imaging. There is a new area of patchy opacity at
the left base, which may be consistent with developing consolidation. The
cardiac silhouette is normal. The aortic arch is unfolded. There is a small
right-sided pleural effusion. There are no acute osseous findings.
 
IMPRESSION:
1. There is a stable mass in the right upper lobe, seen on prior imaging.
 
2. Patchy opacification at the left base may be consistent with developing
consolidation.
 
Other Results
EXAM TYPE: CAT - CT HEAD WO IV CONTRAST
 
EXAMINATION:
CT HEAD WITHOUT CONTRAST
 
CLINICAL INFORMATION:
Unresponsive the rotatory nystagmus.
 
COMPARISON:
None available.
 
TECHNIQUE:
Contiguous axial imaging was performed from the skull base to vertex without
intravenous administration of contrast.
 
FINDINGS:
This examination is nondiagnostic secondary to significant streak artifact
and patient motion. I cannot exclude infarcts nor for the presence of
intracranial hemorrhage on this exam. There is no hydrocephalus nor midline
shift. No definite significant soft tissue findings. No acute osseous
findings. Trace fluid levels within the maxillary sinuses bilaterally.
Partial ethmoid air cell opacification bilaterally. Small fluid level within
the left sphenoid sinus. Partially imaged nasogastric tube.
 
IMPRESSION:
Nondiagnostic CT of the head secondary to significant streak/motion artifact
despite repeating the acquisition. I cannot exclude infarcts nor hemorrhage
on this study.
 
 
Assessment/Plan
Assessment:
This is 58-year-old female with a medical history of stage 5 CKD(nephrotic 
proteinuria, diabetic nephropathy and retinopathy) with left AV fistula placed 
couple month ago(still making urine), currently not on hemodialysis, has only 
one kidney since childhood.  Chronic anemia, hypertension, type 1 diabetes on 
insulin pump, hypothyroidism, ankylosing spondylitis on chronic prednisone, 
depression, GERD, right lung mass according to the family the biopsy came back 
as a benign, hyperparathyroidism due to renal insufficiency, chronic pain.  
Presented to the emergency department via EMS due to unresponsiveness.
 
Problem list:
-Status post cardiac arrest in the ED was on asystole
-Acute hypoxic respiratory failure currently intubated.
-Unresponsiveness 2/2 hypoglycemia versus encephalopathy versus cerebellar 
stroke.
-Hyppoglycemia with elevated insulin level. 
-Elevated troponin type II MI versus ST segment depression MI. 
-New RBBB, wide QRS tachycardia.
-End-stage renal disease with metabolic acidosis.
-Leukocytosis with bandemia that could be most likely due to aspiration 
pneumonia. 
 
Plan:
-Admit patient to critical care unit
-Vitals every shift, blood pressure continuous monitoring
-Start cooling pre protocol
-Obtain coagulation panel, type and crossmatch. 
-Continue Levophed to keep systolic blood pressure more than 90mmHg 
-IV Unasyn 3 g per renal clearance
-Panculture including blood, urine and sputum. Obtain urine tox 
-150 mEq sodium bicarbonate and D5 at 100 mL per hour the 1st 500 ml as a bolus.
-IV heparin drip pre cardiology, trend troponin and EKG.
-Obtain echocardiogram.
-D10 dextrose at 20 mL per hour at the second Accu-Chek more than 100 DC the 
dextrose.
-hydrocortisone 50 mg IV twice a day, 25 MCG IV levothyroxin
-Accu-Chek every 30 minute via peripheral blood sample.
-Follow-up with the family regarding the insulin pump setting.
-40 mg of IV Protonix for GI ppt. 
-Recheck insulin level, TSH, free T4, prolactin level.
-Repeat ABG, chest x-ray in a.m. 
-Neurology, cardiology, nephrology, endocrinology consultation, we will follow 
recommendations.
-DVT prophylaxis: Heparin Drip
-Full code
 
As Ranked By This Provider
Problem List:
 1. Metabolic acidosis
 
 2. Hypoglycemia
 
 3. NSTEMI (non-ST elevated myocardial infarction)
 
 
Core Measures/Misc (9/17)
 
Acute Coronary Syndrome
ACS Diagnosis: Yes
 
Congestive Heart Failure
Congestive Heart Failure Diagnosis Yes
 
Cerebrovascular Accident
CVA/TIA Diagnosis: No
 
VTE (View Protocol)
VTE Risk Factors Age>40
No Mechanical VTE Prophylaxis d/t N/A MechProphylax Ordered
No VTE Pharm Prophylaxis d/t NA PharmProphylax ordered
 
Sepsis (View protocol)
Sepsis Present: Yes
 
 
Maria T BARLOWCONSUELO Howard 03/19/18 2005:
Attending MD Review Statement
 
Attending Statement
Attending MD Statement: examined this patient, discuss w/resident/PA/NP, agreed 
w/resident/PA/NP, discussed with family
Attending Assessment/Plan:
I have personally seen and examined the patient multiple times throughout the 
day.  I have had multiple family meetings with the patient's , sister, 
mother and daughter.  I have discussed the plan of care with the housestaff at 
length.  Briefly, the patient is a 58-year-old female with a history of stage V 
CK D secondary to diabetic nephropathy status post left AV fistula and currently
not on hemodialysis.  She also has chronic anemia, hypertension, type 1 diabetes
on an insulin pump, hypothyroidism, ankylosing spondylitis on chronic prednisone
, depression, reflux disease, and a chronic right lung mass which has been 
worked up by Dr. Haddad and Dr. Guevara at length.  There has been no evidence 
of malignancy demonstrated.  The patient was found yesterday afternoon laying in
her bed which was not her baseline.  The last time she was seen awake and alert 
at home around 7 PM.  The patient's  went to check on her at 7 AM noting 
she was found to be unresponsive foaming at the mouth and having had evidence of
vomiting with aspiration.  She also had bowel and urine incontinence.  911 was 
called and an Accu-Chek was done with a blood sugar of 27.  The patient's family
stated her insulin pump was discontinued the day before yesterday.  She was 
given D10 on the way to the hospital and had significant airway congestion and 
hypoxemia.  In the ED, the patient had hypoxia was placed on BiPAP for metabolic
acidosis on ABG.  She had a positive troponin of 2.3.  The patient received 
ceftriaxone and azithromycin following pan culturing.  She was also given IV 
fluids for volume resuscitation.  Initial chest x-ray showed a patchy opacity at
the left base which was suggestive of developing consolidation.  She had a 
stable right upper lobe lung mass.  She had a head CT that was negative for any 
acute pathology.  After reviewing her labs and examining the patient, multiple 
consultants were called including nephrology, endocrinology, cardiology, 
neurology and ID.  Of note, the patient was intubated approximately around 4:10 
PM.  The reason for intubation was the patient remained unresponsive and was 
unable to adequately protect her airway.  She also appeared to be in increased 
respiratory distress and had ongoing evidence of a bulk acidosis despite BiPAP. 
Fifteen minutes following the intubation, the patient became bradycardic and 
atropine was ordered.  She was subsequently noted to be in cardiac arrest and 
ACLS was initiated as per protocol.  The patient was reported to have a 
ventricular arrhythmia and was given 150 mg of amiodarone.  The patient had 
recovery of spontaneous circulation several minutes after CPR was initiated.  
She received a total of 2 mg of atropine, 1 amp of epinephrine, 1 amp of sodium 
bicarbonate, 40 mg of vasopressin, and she was subsequently started on dopamine 
and Levophed for a low systolic blood pressure.  The patient had no spontaneous 
movements and her pupils were fixed and dilated postarrest.  Her third troponin 
came back at 3.04.  The patient was given stress dose steroids and a right 
femoral central line was placed in the ED.  The patient is currently in the 
intensive care unit.  Again, she has no spontaneous movements and her pupils 
remained fixed and dilated.  Because this was a witnessed cardiac arrest, in 
hospital, with evidence of ventricular tachycardia, therapeutic hypothermia as 
per protocol is being initiated.  We will plan to follow up cultures, continue 
with IV Unasyn as per ID, and tenuous volume resuscitation as per nephrology (no
current role for dialysis), taper pressors down to off to maintain the systolic 
blood pressure greater then 90 mmHg, monitor blood sugars every 30 minutes per 
endocrinology (continue D10 until blood sugars are stabilized), continue stress 
dose steroids, provide IV Protonix, and DVT prophylaxis at all times.  A heparin
drip has been recommended by cardiology will monitor PTTs.  A repeat ABG has 
been requested post administration of bicarbonate therapy.  Absolute be drawn as
per therapeutic hypothermia protocol.  Will also repeat a lactic acid.  Will 
trend troponins.  The endotracheal tube has been removed and repeat chest x-ray 
is pending.  Will follow up all consultants recommendations.  Appreciate input. 
The patient remains critically ill.  I have discussed her current situation with
the patient's family and housestaff in detail.  I have asked to be contacted 
during the night should the patient's condition change or deteriorate.

## 2018-03-19 NOTE — CT SCAN REPORT
EXAMINATION:
CT HEAD WITHOUT CONTRAST
 
CLINICAL INFORMATION:
Unresponsive the rotatory nystagmus.
 
COMPARISON:
None available.
 
TECHNIQUE:
Contiguous axial imaging was performed from the skull base to vertex without
intravenous administration of contrast.
 
FINDINGS:
This examination is nondiagnostic secondary to significant streak artifact
and patient motion. I cannot exclude infarcts nor for the presence of
intracranial hemorrhage on this exam. There is no hydrocephalus nor midline
shift. No definite significant soft tissue findings. No acute osseous
findings. Trace fluid levels within the maxillary sinuses bilaterally.
Partial ethmoid air cell opacification bilaterally. Small fluid level within
the left sphenoid sinus. Partially imaged nasogastric tube.
 
IMPRESSION:
Nondiagnostic CT of the head secondary to significant streak/motion artifact
despite repeating the acquisition. I cannot exclude infarcts nor hemorrhage
on this study.

## 2018-03-19 NOTE — CONS- UROLOGY
General Information and HPI
 
Consulting Request
Date of Consult: 03/19/18
Requested By:
Maria T BARLOW,CONSUELO Howard
 
Reason for Consult:
Patient unresponsive.  Unable to place pérez
Source of Information: old records
Exam Limitations: no limitations
History of Present Illness:
This patient was brought to the ER with unresponsiveness due to severe 
hypoglycemia.  In the ER she sustained a cardiac arrest and was eventually 
resusitated and transfered to the ICU.  She remains intubated and critically ill
with unclear prognosis.  An attempt to place pérez by the nursing staff was 
unsuccessful.  The patient has a hx of pelvic surgery the details of which are 
not known.
 
Allergies/Medications
Allergies:
Uncoded Allergies:
all pain medications (NAUSEA 10/14/14)
 
Home Med List:
Amlodipine Besylate 10 MG TABLET   1 TAB PO DAILY HEART  (Reported)
Aspirin (Aspirin*) 81 MG TAB.CHEW   1 TAB PO DAILY HEART HEALTH  (Reported)
Escitalopram Oxalate 5 MG TABLET   1 TAB PO DAILY MENTAL HEALTH  (Reported)
Furosemide 40 MG TABLET   1 TAB PO DAILY WATER RETENTION  (Reported)
Insulin Aspart (Novolog) (Unknown Strength) VIAL   (Unknown Dose) AC DIABETES  (
Reported)
Levothyroxine Sodium 50 MCG TABLET   1 TAB PO DAILY AC THYROID  (Reported)
Losartan Potassium 100 MG TABLET   1 TAB PO DAILY HEART  (Reported)
Prednisone 10 MG TABLET   1 TAB PO DAILY STEROID  (Reported)
Sevelamer Carbonate (Renvela) 800 MG TABLET   1 TAB PO AC UNKNOWN  (Reported)
 
Current Medications:
 Current Medications
 
 
  Sig/Susan Start time  Last
 
Medication Dose Route Stop Time Status Admin
 
Acetaminophen 1,000 MG Q6P PRN 03/19 1445 AC 
 
  IV   
 
Ampicillin Sodium/ 1,500 MG Q6 03/19 1800 CAN 
 
Sulbactam Sodium  IV   
 
Sodium Chloride 100 ML    
 
Ampicillin Sodium/ 3,000 MG Q12 03/19 1730 AC 03/19
 
Sulbactam Sodium  IV   2019
 
Sodium Chloride 100 ML    
 
Aspirin 300 MG ONCE ONE 03/19 1145 DC 03/19
 
  WI 03/19 1146  1145
 
Atropine Sulfate 0 .STK-MED ONE 03/19 1630 DC 
 
  .ROUTE   
 
Azithromycin 500 MG ONCE ONE 03/19 1000 DC 03/19
 
Dextrose/Water 250 ML IV 03/19 1059  1053
 
Ceftriaxone Sodium 0 .STK-MED ONE 03/19 1030 DC 
 
  .ROUTE   
 
Ceftriaxone Sodium 1,000 MG ONCE ONE 03/19 1000 DC 03/19
 
  IV 03/19 1001  1053
 
Dextrose/Sodium  1,000 ML Q10H 03/19 1145 DC 03/19
 
Chloride  IV   1248
 
Dextrose/Sodium  1,000 ML Q10H 03/19 1100 DC 03/19
 
Chloride  IV   1100
 
Dextrose/Water 1,000 ML Q20H 03/19 1630 DC 03/19
 
  IV   1726
 
Dextrose/Water 250 ML .STK-MED ONE 03/19 1156 DC 
 
  IV   
 
Etomidate 20 MG ONCE ONE 03/19 1545 DC 03/19
 
  IV 03/19 1546  1619
 
Etomidate 0 .STK-MED ONE 03/19 1545 DC 
 
  IV   
 
Glucagon 0 .STK-MED ONE 03/19 1156 DC 
 
  .ROUTE   
 
Heparin Sodium  0 .STK-MED ONE 03/19 1756 DC 
 
(Porcine)  .ROUTE   
 
Heparin Sodium  4,000 UNIT ONCE ONE 03/19 1730 DC 03/19
 
(Porcine)  IV 03/19 1731  2025
 
Heparin Sodium  25,000 UNIT Q24H 03/19 1730 AC 03/19
 
(Porcine)  IV   2036
 
Sodium Chloride 500 ML    
 
Hydrocortisone  50 MG Q12 03/20 1000 DC 
 
Sodium Succinate  IV   
 
Hydrocortisone  50 MG DAILY 03/20 1000 AC 
 
Sodium Succinate  IV   
 
Hydrocortisone  100 MG ONE ONE 03/19 1615 DC 03/19
 
Sodium Succinate  IV 03/19 1616  1709
 
Insulin Aspart 0 Q4 03/19 2200 AC 03/19
 
  SC   2245
 
Insulin Detemir 12 UNITS BID 03/19 2200 AC 03/19
 
  SC   2245
 
Insulin Human Regular 0 Q6 03/19 1440 DC 03/19
 
  SC   1813
 
Levothyroxine Sodium 25 MCG DAILY 03/20 1000 AC 
 
  IV   
 
Lorazepam 0 .STK-MED ONE 03/19 1604 DC 
 
  .ROUTE   
 
Norepinephrine 8 MG Q24H 03/20 0600 AC 
 
Sodium Chloride 250 ML IV   
 
Norepinephrine 8 MG ONCE ONE 03/19 1830 DC 03/19
 
Sodium Chloride 250 ML IV 03/19 1831  2014
 
Norepinephrine 0 .STK-MED ONE 03/19 1825 DC 
 
  IV   
 
Norepinephrine 0 .STK-MED ONE 03/19 1654 DC 
 
  IV   
 
Norepinephrine 8 MG ONCE ONE 03/19 1645 DC 03/19
 
Sodium Chloride 250 ML IV 03/19 1646  1748
 
Pantoprazole Sodium 40 MG DAILY 03/19 1730 AC 03/19
 
  IV   2015
 
Sodium Bicarbonate 50 MEQ ONCE ONE 03/19 1900 DC 03/19
 
  IV 03/19 1901  2014
 
Sodium Bicarbonate 150 MEQ Q10H 03/19 1900 AC 03/19
 
Dextrose/Water 1,000 ML IV   2100
 
Sodium Bicarbonate 100 MEQ CONTINOUS INFUSION 03/19 1600 DC 
 
Dextrose/Water 1,000 ML IV   
 
Sodium Chloride 500 ML BOLUS ONE 03/19 1615 DC 03/19
 
  IV 03/19 1714  1617
 
Succinylcholine  100 MG ONCE ONE 03/19 1545 DC 03/19
 
Chloride  IV 03/19 1546  1619
 
 
 
 
Past History
 
Medical History
Blood Transfusion Hx: No
Neurological: NONE
EENT: NONE
Cardiovascular: hypertension
Respiratory: obstructive sleep apnea
Gastrointestinal: NONE
Hepatic: NONE
Renal: chronic kidney disease
Musculoskeletal: arthritis
Psychiatric: NONE
Endocrine: diabetes type 1
Blood Disorders: NONE
Cancer(s): NONE
GYN/Reproductive: NONE
 
Surgical History
Pertinent Surgical History: L AVF
 
Family History
Relations & Conditions If Any:
MOTHER (Diabetes, hyperlipidemia).
 
 
Psychosocial History
Where Do You Live? Home
Services at Home: None
Smoking Status: Former Smoker
ETOH Use: occasional use
Illicit Drug Use: denies illicit drug use
 
Exam & Diagnostic Data
Vital Signs and I&O
Vital Signs
 
 
Date Time Temp Pulse Resp B/P B/P Pulse O2 O2 Flow FiO2
 
     Mean Ox Delivery Rate 
 
03/19 2253         65
 
03/19 2000         75
 
03/19 2000      99 Ventilator 85% 
 
03/19 1950       Ventilator 85% 
 
03/19 1825 100.0 126 18 136/64  100 Ventilator 100% 
 
03/19 1758 99.7 128 18 181/68  100 Ventilator  
 
03/19 1727 100.1 126 20 136/64  100 Ventilator 100% 
 
03/19 1705  165 18 199/80  98 Ventilator 100% 
 
03/19 1623         100
 
03/19 1553 97.9 122 18 152/72  100   
 
03/19 1430  111    93   
 
03/19 1419  110 24 119/63  97 BIPAP 60% 
 
03/19 1330      96   
 
03/19 1210 96.9 96 24 128/72  99 BIPAP 60% 
 
03/19 1150  111    97   
 
03/19 1102 97.0 90 24 137/83  99 BIPAP 60% 
 
03/19 1000  88 24 129/78  100 BIPAP 100% 
 
03/19 1000  111    96   
 
03/19 0900      97 Non 9L 
 
       ReBreather  
 
03/19 0845      96 Non 100% 
 
       ReBreather  
 
03/19 0842 97.5 110 28 151/79  87 Room Air Room Air 
 
 
 Intake & Output
 
 
 03/19 1600 03/19 0800 03/19 0000 03/18 1600 03/18 0800 03/18 0000
 
Intake Total 1250     
 
Output Total 3     
 
Balance 1247     
 
       
 
Intake, IV 1250     
 
Output, Urine 3     
 
Patient 160 lb     
 
Weight      
 
Weight Estimated     
 
Measurement      
 
Method      
 
 
 
Pt intubated
 
Abd:  soft and non tender.  Transverse lower abdominal incision is well healed
Genitalia:  Normal external genitalia.  Urethral meatus is visible
 
 
Procedure:
     Attempt to place pérez was unsuccessful due urethral stricture likely from 
her previous surgery.  Using filiforms and followers the urethra was dilated to 
16 fr.  Following this a 14 fr pérez was placed
 
Assessment/Plan
Assessment/Plan
 
Imp:
     1.  Urethral stricture, likely from previous surgery
 
 
 
Plan:
     1.  Leave pérez in place while patient is critically ill
 
 
Consult Acknowledgment
- Thank you for your consult request.

## 2018-03-20 VITALS — SYSTOLIC BLOOD PRESSURE: 128 MMHG | DIASTOLIC BLOOD PRESSURE: 64 MMHG

## 2018-03-20 VITALS — SYSTOLIC BLOOD PRESSURE: 118 MMHG | DIASTOLIC BLOOD PRESSURE: 60 MMHG

## 2018-03-20 VITALS — SYSTOLIC BLOOD PRESSURE: 126 MMHG | DIASTOLIC BLOOD PRESSURE: 60 MMHG

## 2018-03-20 LAB
ABSOLUTE GRANULOCYTE CT: 12.2 /CUMM (ref 1.4–6.5)
ABSOLUTE GRANULOCYTE CT: 13.7 /CUMM (ref 1.4–6.5)
APTT BLD: 39 SEC (ref 25–37)
APTT BLD: 63 SEC (ref 25–37)
APTT BLD: 64 SEC (ref 25–37)
BASOPHILS # BLD: 0 /CUMM (ref 0–0.2)
BASOPHILS # BLD: 0 /CUMM (ref 0–0.2)
BASOPHILS NFR BLD: 0.1 % (ref 0–2)
BASOPHILS NFR BLD: 0.2 % (ref 0–2)
EOSINOPHIL # BLD: 0 /CUMM (ref 0–0.7)
EOSINOPHIL # BLD: 0 /CUMM (ref 0–0.7)
EOSINOPHIL NFR BLD: 0 % (ref 0–5)
EOSINOPHIL NFR BLD: 0 % (ref 0–5)
ERYTHROCYTE [DISTWIDTH] IN BLOOD BY AUTOMATED COUNT: 18.7 % (ref 11.5–14.5)
ERYTHROCYTE [DISTWIDTH] IN BLOOD BY AUTOMATED COUNT: 18.8 % (ref 11.5–14.5)
GRANULOCYTES NFR BLD: 85.9 % (ref 42.2–75.2)
GRANULOCYTES NFR BLD: 88.2 % (ref 42.2–75.2)
HCT VFR BLD CALC: 26.3 % (ref 37–47)
HCT VFR BLD CALC: 26.8 % (ref 37–47)
LYMPHOCYTES # BLD: 1.3 /CUMM (ref 1.2–3.4)
LYMPHOCYTES # BLD: 1.5 /CUMM (ref 1.2–3.4)
MCH RBC QN AUTO: 26 PG (ref 27–31)
MCH RBC QN AUTO: 26.2 PG (ref 27–31)
MCHC RBC AUTO-ENTMCNC: 32.2 G/DL (ref 33–37)
MCHC RBC AUTO-ENTMCNC: 33 G/DL (ref 33–37)
MCV RBC AUTO: 79.5 FL (ref 81–99)
MCV RBC AUTO: 80.5 FL (ref 81–99)
MONOCYTES # BLD: 0.4 /CUMM (ref 0.1–0.6)
MONOCYTES # BLD: 0.5 /CUMM (ref 0.1–0.6)
PLATELET # BLD: 319 /CUMM (ref 130–400)
PLATELET # BLD: 338 /CUMM (ref 130–400)
PMV BLD AUTO: 7.1 FL (ref 7.4–10.4)
PMV BLD AUTO: 7.2 FL (ref 7.4–10.4)
RED BLOOD CELL CT: 3.3 /CUMM (ref 4.2–5.4)
RED BLOOD CELL CT: 3.33 /CUMM (ref 4.2–5.4)
WBC # BLD AUTO: 14.2 /CUMM (ref 4.8–10.8)
WBC # BLD AUTO: 15.5 /CUMM (ref 4.8–10.8)

## 2018-03-20 NOTE — PN- INFECT DX
Subjective
Subjective:
MAXIMUM TEMPERATURE 101.3 on steroids.  Her blood pressure remains stable on no 
pressors.  She was sedated until early this morning but has remained 
unresponsive.
 
Objective
Last 24 Hrs of Vital Signs/I&O
 Vital Signs
 
 
Date Time Temp Pulse Resp B/P B/P Pulse O2 O2 Flow FiO2
 
     Mean Ox Delivery Rate 
 
03/20 0839         40
 
03/20 0800 100.8 92 30 118/60  98 Ventilator 65% 
 
03/20 0800      98 Ventilator 65% 
 
03/20 0702         65
 
03/20 0648 101.1        
 
03/20 0551 101.3        
 
03/20 0400      99 Ventilator 65% 
 
03/20 0344         65
 
03/20 0105         65
 
03/20 0000 96.6 110 30 128/64  99 Ventilator 65% 
 
03/20 0000      99 Ventilator 65% 
 
03/19 2253         65
 
03/19 2000         75
 
03/19 2000      99 Ventilator 85% 
 
03/19 1950       Ventilator 85% 
 
03/19 1825 100.0 126 18 136/64  100 Ventilator 100% 
 
03/19 1758 99.7 128 18 181/68  100 Ventilator  
 
03/19 1727 100.1 126 20 136/64  100 Ventilator 100% 
 
03/19 1705  165 18 199/80  98 Ventilator 100% 
 
03/19 1623         100
 
03/19 1553 97.9 122 18 152/72  100   
 
03/19 1430  111    93   
 
03/19 1419  110 24 119/63  97 BIPAP 60% 
 
03/19 1330      96   
 
03/19 1210 96.9 96 24 128/72  99 BIPAP 60% 
 
03/19 1150  111    97   
 
 
 Intake & Output
 
 
 03/20 1600 03/20 0800 03/20 0000
 
Intake Total  1059 1455
 
Output Total  32 20
 
Balance  1027 1435
 
    
 
Intake, IV  1059 1455
 
Output, Urine  32 20
 
Patient 169 lb  170 lb
 
Weight   
 
Weight   Bed scale
 
Measurement   
 
Method   
 
 
 
 
Physical Exam
Other Physical Findings:
She is unresponsive on the ventilator
Lungs are clear anteriorly
Heart regular rhythm with no murmur
Abdomen soft, with positive bowel sounds
Extremities no cyanosis, clubbing or edema
 Jose catheter is in place with minimal urine output
 
 
Results
Last 24 Hours of Lab Results:
 Laboratory Tests
 
 
 03/20 03/20 03/20 03/20
 
 1115 0835 0835 0655
 
Blood Gas    
 
  pH (7.35 - 7.45 PH)    7.48  H
 
  pCO2 (35 - 45 TORR)    27  L
 
  pO2 (80 - 100 TORR)    219  H
 
  HCO3 (21 - 28 MEQ/L)    20  L
 
  ABG O2 Sat (Measured) (>96.0 %)    99.0
 
  P-50 (Temp Corrected)    N
 
  Carboxyhemoglobin (1.5 - 5.0 %)    0.4  L
 
  O2 Concentration %    .65
 
  Respiration Rate (BPM)    30
 
  O2 Delivery Method    VENT
 
  Vent Mode    A/C
 
  Expiratory Pressure (CMH2O/P)    5
 
  Tidal Volume (CC)    500
 
Chemistry    
 
  Lactic Acid (0.7 - 2.1 mmol/L) Pending  3.4  H 
 
  Troponin I (< 0.11 ng/ml)  3.74 *H  
 
Miscellaneous    
 
  Phlebotomy Draw Site    RIGHT BRACHIAL
 
 
 
 
 03/20 03/20 03/20
 
 0600 0500 0230
 
Chemistry   
 
  Sodium (137 - 145 mmol/L)  145 
 
  Potassium (3.5 - 5.1 mmol/L)  4.6 
 
  Chloride (98 - 107 mmol/L)  105 
 
  Carbon Dioxide (22 - 30 mmol/L)  22 
 
  Anion Gap (5 - 16)  18  H 
 
  BUN (7 - 17 mg/dL)  97  H 
 
  Creatinine (0.5 - 1.0 mg/dL)  6.5 *H 
 
  Estimated GFR (>60 ml/min)  7  L 
 
  Glucose (65 - 99 mg/dL)  295  H 
 
  Insulin Level (3.0 - 25.0 mIU/mL)  88.5  H 
 
  Lactic Acid (0.7 - 2.1 mmol/L)  3.0  H 
 
  Calcium (8.4 - 10.2 mg/dL)  6.7  L 
 
  Phosphorus (2.5 - 4.5 mg/dL)  8.3  H 
 
  Magnesium (1.6 - 2.3 mg/dL)  1.6 
 
  Total Bilirubin (0.2 - 1.3 mg/dL)  0.4 
 
  AST (14 - 36 U/L)  138  H 
 
  ALT (9 - 52 U/L)  124  H 
 
  Troponin I (< 0.11 ng/ml) Cancelled  4.20 *H
 
  Albumin (3.5 - 5.0 g/dL)  2.1  L 
 
Hematology   
 
  CBC w Diff  MAN DIFF ORDERED 
 
  WBC (4.8 - 10.8 /CUMM)  15.5  H 
 
  RBC (4.20 - 5.40 /CUMM)  3.33  L 
 
  Hgb (12.0 - 16.0 G/DL)  8.6  L 
 
  Hct (37 - 47 %)  26.8  L 
 
  MCV (81.0 - 99.0 FL)  80.5  L 
 
  MCH (27.0 - 31.0 PG)  26.0  L 
 
  MCHC (33.0 - 37.0 G/DL)  32.2  L 
 
  RDW (11.5 - 14.5 %)  18.8  H 
 
  Plt Count (130 - 400 /CUMM)  319 
 
  MPV (7.4 - 10.4 FL)  7.2  L 
 
  Gran % (42.2 - 75.2 %)  88.2  H 
 
  Lymphocytes % (20.5 - 51.1 %)  8.3  L 
 
  Monocytes % (1.7 - 9.3 %)  3.4 
 
  Eosinophils % (0 - 5 %)  0 
 
  Basophils % (0.0 - 2.0 %)  0.1 
 
  Absolute Granulocytes (1.4 - 6.5 /CUMM)  13.7  H 
 
  Segmented Neutrophils (42.2 - 75.2 %)  81  H 
 
  Band Neutrophils (0.0 - 5.0 %)  4 
 
  Absolute Lymphocytes (1.2 - 3.4 /CUMM)  1.3 
 
  Lymphocytes (20.5 - 51.1 %)  13  L 
 
  Monocytes (1.7 - 9.3 %)  2 
 
  Absolute Monocytes (0.10 - 0.60 /CUMM)  0.5 
 
  Absolute Eosinophils (0.0 - 0.7 /CUMM)  0 
 
  Absolute Basophils (0.0 - 0.2 /CUMM)  0 
 
  Platelet Estimate (ADEQUATE)  ADEQUATE 
 
  Polychromasia  1+ 
 
  Hypochromic-Microcytic  1+ 
 
  Poikilocytosis  2+ 
 
  Basophilic Stippling  SLIGHT 
 
  Anisocytosis  1+ 
 
  Microcytic Cells  1+ 
 
  Ovalocytes  1+ 
 
  Stomatocytes  1+ 
 
Other Body Source   
 
  Fld Total RBCs Counted (%)  100 
 
 
 
 
 03/20 03/19 03/19 03/19
 
 0230 2255 2255 2240
 
Chemistry    
 
  Sodium (137 - 145 mmol/L)   144 
 
  Potassium (3.5 - 5.1 mmol/L)   4.9 
 
  Chloride (98 - 107 mmol/L)   106 
 
  Carbon Dioxide (22 - 30 mmol/L)   17  L 
 
  Anion Gap (5 - 16)   20  H 
 
  BUN (7 - 17 mg/dL)   96  H 
 
  Creatinine (0.5 - 1.0 mg/dL)   6.2 *H 
 
  Estimated GFR (>60 ml/min)   7  L 
 
  Glucose (65 - 99 mg/dL)   328  H 
 
  Lactic Acid (0.7 - 2.1 mmol/L) 3.2  H 3.3  H  
 
  Calcium (8.4 - 10.2 mg/dL)   6.9  L 
 
  Phosphorus (2.5 - 4.5 mg/dL)   8.9  H 
 
  Magnesium (1.6 - 2.3 mg/dL)   1.7 
 
  Total Bilirubin (0.2 - 1.3 mg/dL)   0.4 
 
  AST (14 - 36 U/L)   221  H 
 
  ALT (9 - 52 U/L)   149  H 
 
  Albumin (3.5 - 5.0 g/dL)   2.3  L 
 
Coagulation    
 
  APTT (25 - 37 SEC) 63  H   
 
Toxicology    
 
  Urine Opiates Screen (>2000 NG/ML)    < 100
 
  Methadone Screen (>300 NG/ML)    < 40
 
  Barbiturate Screen (>200 NG/ML)    < 60
 
  Ur Phencyclidine Scrn (>25 NG/ML)    < 6.00
 
  Amphetamines Screen (>1000 NG/ML)    < 100
 
  U Benzodiazepines Scrn (>200 NG/ML)    < 85
 
  Urine Cocaine Screen (>300 NG/ML)    < 50
 
  Urine Cannabis Screen (>50 NG/ML)    < 5.00
 
 
 
 
 03/19 03/19 03/19 03/19 2235 2057 1931 1931
 
Blood Gas    
 
  pH (7.35 - 7.45 PH) 7.29 *L   
 
  pCO2 (35 - 45 TORR) 33  L   
 
  pO2 (80 - 100 TORR) 87   
 
  HCO3 (21 - 28 MEQ/L) 16  L   
 
  ABG O2 Sat (Measured) (>96.0 %) 96.0   
 
  P-50 (Temp Corrected) Y   
 
  Carboxyhemoglobin (1.5 - 5.0 %) 0.3  L   
 
  O2 Concentration % 65%   
 
  Temperature (97.0 - 100.0 FARH) 96.4  L   
 
  Respiration Rate (BPM) 30   
 
  O2 Delivery Method ESPRIT   
 
  Vent Mode AC   
 
  Expiratory Pressure (CMH2O/P) 5   
 
  Tidal Volume (CC) 500   
 
Chemistry    
 
  Sodium (137 - 145 mmol/L)    144
 
  Potassium (3.5 - 5.1 mmol/L)    5.6  H
 
  Chloride (98 - 107 mmol/L)    108  H
 
  Carbon Dioxide (22 - 30 mmol/L)    15  L
 
  Anion Gap (5 - 16)    20  H
 
  BUN (7 - 17 mg/dL)    95  H
 
  Creatinine (0.5 - 1.0 mg/dL)    6.3 *H
 
  Estimated GFR (>60 ml/min)    7  L
 
  Glucose (65 - 99 mg/dL)    228  H
 
  Lactic Acid (0.7 - 2.1 mmol/L)   3.8  H 
 
  Calcium (8.4 - 10.2 mg/dL)    7.2  L
 
  Phosphorus (2.5 - 4.5 mg/dL)    10.7  H
 
  Magnesium (1.6 - 2.3 mg/dL)    1.7
 
  Total Bilirubin (0.2 - 1.3 mg/dL)    0.5
 
  AST (14 - 36 U/L)    253  H
 
  ALT (9 - 52 U/L)    161  H
 
  Troponin I (< 0.11 ng/ml)  3.46 *H  
 
  Albumin (3.5 - 5.0 g/dL)    2.3  L
 
Coagulation    
 
  PT (9.4 - 12.5 SEC)    13.1  H
 
  INR (0.90 - 1.19)    1.20  H
 
  APTT (25 - 37 SEC)    28
 
Miscellaneous    
 
  Phlebotomy Draw Site RIGHT BRACHIAL   
 
 
 
 
 03/19 03/19 03/19 03/19
 
 1710 1510 1307 1140
 
Blood Gas    
 
  pH (7.35 - 7.45 PH) 7.06 *L   7.29 *L
 
  pCO2 (35 - 45 TORR) 42   32  L
 
  pO2 (80 - 100 TORR) 167  H   238  H
 
  HCO3 (21 - 28 MEQ/L) 12  L   15  L
 
  ABG O2 Sat (Measured) (>96.0 %) 97.0   98.0
 
  P-50 (Temp Corrected) N   
 
  Carboxyhemoglobin (1.5 - 5.0 %) 0.3  L   0.3  L
 
  O2 Concentration % 100%   100%
 
  Temperature (97.0 - 100.0 FARH) 97.5   
 
  Respiration Rate (BPM) 26   24
 
  O2 Delivery Method ESPRIT VENT   BIPAP
 
  Vent Mode AC   ST
 
  Expiratory Pressure (CMH2O/P) 5   6
 
  Tidal Volume (CC) 500   
 
  Inspiratory Pressure (CM H2O P)    16
 
Chemistry    
 
  Lactic Acid (0.7 - 2.1 mmol/L)   2.0 
 
  Troponin I (< 0.11 ng/ml)  3.04 *H  
 
Miscellaneous    
 
  Phlebotomy Draw Site RIGHT BRACHIAL   RIGHT RADIAL
 
 
 
Last 24 Hours of Sid Results:
Blood culture 1 March 19 negative
 
Blood culture 1 March 20 pending
 
Sputum culture March 19 scant growth of mixed eduardo
 
Urine culture March 19 negative
 
Recent Imaging Studies:
Chest x-ray March 20 reveals persistent bilateral airspace opacities
 
 
Assessment/Plan ID
Impression:
She remains critically ill, unresponsive since a cardiac arrest yesterday 
afternoon, now off sedation.  She remains febrile with a leukocytosis, which may
be in part secondary to steroids, on Unasyn Day 2 of treatment for possible 
aspiration pneumonia, with a decrease in her oxygen requirements, with her 
sputum culture only growing mixed eduardo.  Her prognosis remains guarded, with an
elevated troponin and with renal failure, with dialysis considered likely to be 
necessary in the next few days.
 
Suggestion:
1.  Follow-up recent cultures
2.  Continue Unasyn

## 2018-03-20 NOTE — ECHOCARDIOGRAM REPORT
JINA RASCON 
 
 Age:    58     :    1960      Gender:     F 
 
 MRN:    [id number] 
 
 Exam Date:     2018  
                20:05 
 
 Exam Location: [facility] 
 
 Ht (in):     60      Wt (lb):      160     BSA:    1.78 
 
 BP:          119     /     63 
 
 Ordering Physician:        Prabhakar Fernandez MD 
 
 Referring Physician:       Silviano Arias MD,  
                            PhD 
 
 Technologist:              [facility] 
 
 Room Number:               104 
 
 Indications:       SHORTNESS OF BREATH 
 
 Rhythm:                 Sinus 
 
 Technical Quality:      fair 
 
 FINDINGS 
 
 Left Ventricle 
 Normal left ventricular size, wall thickness and systolic function  
 with severe hypokinesis of the apex, mid to distal inferior and  
 distal anteroseptal wall.   The ejection fraction is visually  
 estimated at 30%. 
 
 Right Ventricle 
 The right ventricle is normal in size and function. 
 
 Right Atrium 
 The right atrium is normal in size. 
 
 Left Atrium 
 The left atrium is normal in size.  The interatrial septum is  
 intact. 
 
 Mitral Valve 
 The mitral valve demonstrates mild annuar calcification with normal  
 function.  There is trace mitral regurgitation. 
 
 Aortic Valve 
 Structurally normal aortic valve without significant sclerosis or  
 stenosis.  There is no aortic regurgitation. 
 
 Tricuspid Valve 
 The tricuspid valve is normal in structure and function.  There is  
 trace tricuspid regurgitation.  Pulmonary artery systolic pressure  
 is normal. 
 
 Pulmonic Valve 
 Structurally normal pulmonic valve.  There is no pulmonic  
 regurgitation. 
 
 Pericardium 
 Normal pericardium without effusion.  No pleural effusion. 
 
 Great Vessels 
 Normal aortic root dimension.  The aortic arch and great vessels are  
 well seen and are normal. 
 
 CONCLUSIONS 
 1. Moderately decreased EF of 30% with apical severe hypokinesis  
 consistent with Takasubo cardiomyopathy. 
 2. Trace mitral regurgitation. 
 3. Trace tricuspid regurgitation. 
 
 Silviano Arias M.D. 
 (Electronically Signed) 
 Final Date:      2018  
                  10:06 
 
 MEASUREMENTS  (Male / Female) Normal Values 
 
 2D ECHO 
 LV Diastolic Diameter PLAX        4.4 cm                4.2 - 5.9 / 3.9 - 5.3 
cm 
 LV Systolic Diameter PLAX         2.9 cm                2.1 - 4.0 cm 
 LV Fractional Shortening PLAX     34.1 %                25 - 46  % 
 LV Ejection Fraction 2D Teich     63.3 %                 
 IVS Diastolic Thickness           1.0 cm                 
 LVPW Diastolic Thickness          1.1 cm                 
 LV Relative Wall Thickness        0.5                    
 RV Internal Dim ED PLAX           1.9 cm                1.9 - 3.8 cm 
 LVOT Diameter                     1.8 cm                 
 Aortic Root Diameter              2.4 cm                 
 LA Systolic Diameter LX           2.7 cm                3.0 - 4.0 / 2.7 - 3.8 
cm 
 LA Volume                         19.0 cm              18 - 58 / 22 - 52 cm 
 Ascending Aorta Diameter          2.7 cm                 
 
 DOPPLER 
 AV Peak Velocity                  157.0 cm/s             
 AV Peak Gradient                  9.9 mmHg               
 AV Mean Velocity                  103.0 cm/s             
 AV Mean Gradient                  5.0 mmHg               
 AV Velocity Time Integral         21.6 cm                
 LVOT Peak Velocity                104.0 cm/s             
 LVOT Peak Gradient                4.3 mmHg               
 LVOT Mean Velocity                76.2 cm/s              
 LVOT Mean Gradient                3.0 mmHg               
 LVOT Velocity Time Integral       16.9 cm                
 LVOT Stroke Volume                43.0 cm               
 AV Area Cont Eq vti               2.0 cm                
 AV Area Cont Eq pk                1.7 cm                
 MV Peak Velocity                  170.5 cm/s             
 MV Peak Gradient                  11.6 mmHg              
 MV Mean Velocity                  90.1 cm/s              
 MV Mean Gradient                  4.0 mmHg               
 Mitral E Point Velocity           132.0 cm/s             
 MV PHT Velocity                   170.0 cm/s             
 MV Deceleration Lares             1269.0 cm/s           
 MV Pressure Half Time             40.2 ms                
 MV Area PHT                       5.5 cm                
 MV Deceleration Time              148.0 ms               
 TR Peak Velocity                  141.0 cm/s             
 TR Peak Gradient                  8.0 mmHg               
 Right Atrial Pressure             10.0 mmHg              
 Pulmonary Artery Systolic Pressu  18.0 mmHg              
 Right Ventricular Systolic Press  18.0 mmHg              
 LV E' Lateral Velocity            17.4 cm/s              
 Mitral E to LV E' Lateral Ratio   7.6                    
 LV E' Septal Velocity             16.9 cm/s              
 Mitral E to LV E' Septal Ratio    7.8

## 2018-03-20 NOTE — PN- CARDIOLOGY
Subjective
Subjective:
* Patient remains unresponsive to verbal stimulus
* sinus rhythm
* creatinine is 6.5
* troponin peaked at 4.2 and is trending down
* Increased WBC count with low grade fever and right lung infiltrate
* decreasing H/H
 
Objective
Vital Signs and I&Os
Vital Signs
 
 
Date Time Temp Pulse Resp B/P B/P Pulse O2 O2 Flow FiO2
 
     Mean Ox Delivery Rate 
 
03/20 1200      96 Ventilator 40% 
 
03/20 1149         40
 
03/20 0839         40
 
03/20 0800 100.8 92 30 118/60  98 Ventilator 65% 
 
03/20 0800      98 Ventilator 65% 
 
03/20 0702         65
 
03/20 0648 101.1        
 
03/20 0551 101.3        
 
03/20 0400      99 Ventilator 65% 
 
03/20 0344         65
 
03/20 0105         65
 
03/20 0000 96.6 110 30 128/64  99 Ventilator 65% 
 
03/20 0000      99 Ventilator 65% 
 
03/19 2253         65
 
03/19 2000         75
 
03/19 2000      99 Ventilator 85% 
 
03/19 1950       Ventilator 85% 
 
03/19 1825 100.0 126 18 136/64  100 Ventilator 100% 
 
03/19 1758 99.7 128 18 181/68  100 Ventilator  
 
03/19 1727 100.1 126 20 136/64  100 Ventilator 100% 
 
03/19 1705  165 18 199/80  98 Ventilator 100% 
 
03/19 1623         100
 
03/19 1553 97.9 122 18 152/72  100   
 
03/19 1430  111    93   
 
03/19 1419  110 24 119/63  97 BIPAP 60% 
 
 
 Intake & Output
 
 
 03/20 1600 03/20 0800 03/20 0000 03/19 1600 03/19 0800 03/19 0000
 
Intake Total  1059 1455 1250  
 
Output Total  32 20 3  
 
Balance  1027 1435 1247  
 
       
 
Intake, IV  1059 1455 1250  
 
Output, Urine  32 20 3  
 
Patient 169 lb  170 lb 160 lb  
 
Weight      
 
Weight   Bed scale Estimated  
 
Measurement      
 
Method      
 
 
 
Physical Exam:
General: WD/overweight female. Intubated.
HEENT: NC/AT, pupils reactive to light, no dolls eyes
Neck: no JVD, no carotid bruit
Heart: RRR w/o murmur
Lungs: clear bilaterally
Abdomen: soft, NT, +ve bowel sounds
Extremities: no edema
Neuro: some spontaneous movement of legs bilaterally
 
Assessment/Plan
Assessment/Plan
* This patient has evidence of coma likely related to prolonged hypoglycemia. A 
neurological evaluation is in progress and the patient is having sedation held 
for better evaluation of her mental status. 
* This patient has evidence of a NSTEMI with cardiac enzymes trending down. I 
suspect that her MI is a type 2 from supply demand mismatch rather than from an 
acute ruptured intracoronary plaque. As such, if bleeding is an issue her IV 
heparin can be stopped. He H/H is trending down. If her H/H falls more transfuse
one unit of pRBC's to decreased myocardial demand and prevent compensatory 
tachycardia in the setting of her MI. I would not begin beta blockers at this 
point in time due to her becoming bradycardic and going into asystole yesterday.
* Continue antibiotics for a likely aspiration pneumonia. 
Continue telemetry? Yes

## 2018-03-20 NOTE — PN- UROLOGY
Subjective
Subjective:
Remains intubated
 
Objective
Vital Signs and I&Os
Vital Signs
 
 
Date Time Temp Pulse Resp B/P B/P Pulse O2 O2 Flow FiO2
 
     Mean Ox Delivery Rate 
 
03/20 0702         65
 
03/20 0648 101.1        
 
03/20 0551 101.3        
 
03/20 0400      99 Ventilator 65% 
 
03/20 0344         65
 
03/20 0105         65
 
03/20 0000 96.6 110 30 128/64  99 Ventilator 65% 
 
03/20 0000      99 Ventilator 65% 
 
03/19 2253         65
 
03/19 2000         75
 
03/19 2000      99 Ventilator 85% 
 
03/19 1950       Ventilator 85% 
 
03/19 1825 100.0 126 18 136/64  100 Ventilator 100% 
 
03/19 1758 99.7 128 18 181/68  100 Ventilator  
 
03/19 1727 100.1 126 20 136/64  100 Ventilator 100% 
 
03/19 1705  165 18 199/80  98 Ventilator 100% 
 
03/19 1623         100
 
03/19 1553 97.9 122 18 152/72  100   
 
03/19 1430  111    93   
 
03/19 1419  110 24 119/63  97 BIPAP 60% 
 
03/19 1330      96   
 
03/19 1210 96.9 96 24 128/72  99 BIPAP 60% 
 
03/19 1150  111    97   
 
03/19 1102 97.0 90 24 137/83  99 BIPAP 60% 
 
03/19 1000  88 24 129/78  100 BIPAP 100% 
 
03/19 1000  111    96   
 
03/19 0900      97 Non 9L 
 
       ReBreather  
 
03/19 0845      96 Non 100% 
 
       ReBreather  
 
03/19 0842 97.5 110 28 151/79  87 Room Air Room Air 
 
 
 Intake & Output
 
 
 03/20 0800 03/20 0000 03/19 1600 03/19 0800 03/19 0000 03/18 1600
 
Intake Total 1059 1455 1250   
 
Output Total 32 20 3   
 
Balance 1027 1435 1247   
 
       
 
Intake, IV 1059 1455 1250   
 
Output, Urine 32 20 3   
 
Patient  170 lb 160 lb   
 
Weight      
 
Weight  Bed scale Estimated   
 
Measurement      
 
Method      
 
 
 
Urine output is low via pérez
 
Abd:  soft and non tender.  Bladder is not palpable
Genitalia:  On pelvic exam pérez balloon appears to be palable in bladder
Attempted to irrigate pérez but it does not irrigate well
 
 
 
 Laboratory Tests
 
 
 03/20 03/20
 
 0655 0600
 
Blood Gas  
 
  pH (7.35 - 7.45 PH) 7.48  H 
 
  pCO2 (35 - 45 TORR) 27  L 
 
  pO2 (80 - 100 TORR) 219  H 
 
  HCO3 (21 - 28 MEQ/L) 20  L 
 
  ABG O2 Sat (Measured) (>96.0 %) 99.0 
 
  P-50 (Temp Corrected) N 
 
  Carboxyhemoglobin (1.5 - 5.0 %) 0.4  L 
 
  O2 Concentration % .65 
 
  Respiration Rate (BPM) 30 
 
  O2 Delivery Method VENT 
 
  Vent Mode A/C 
 
  Expiratory Pressure (CMH2O/P) 5 
 
  Tidal Volume (CC) 500 
 
Chemistry  
 
  Troponin I  Cancelled
 
Miscellaneous  
 
  Phlebotomy Draw Site RIGHT BRACHIAL 
 
 
 
 
 03/20 03/20 03/20
 
 0500 0230 0230
 
Chemistry   
 
  Sodium (137 - 145 mmol/L) 145  
 
  Potassium (3.5 - 5.1 mmol/L) 4.6  
 
  Chloride (98 - 107 mmol/L) 105  
 
  Carbon Dioxide (22 - 30 mmol/L) 22  
 
  Anion Gap (5 - 16) 18  H  
 
  BUN (7 - 17 mg/dL) 97  H  
 
  Creatinine (0.5 - 1.0 mg/dL) 6.5 *H  
 
  Estimated GFR (>60 ml/min) 7  L  
 
  Glucose (65 - 99 mg/dL) 295  H  
 
  Insulin Level (3.0 - 25.0 mIU/mL) Pending  
 
  Lactic Acid (0.7 - 2.1 mmol/L) 3.0  H  3.2  H
 
  Calcium (8.4 - 10.2 mg/dL) 6.7  L  
 
  Phosphorus (2.5 - 4.5 mg/dL) 8.3  H  
 
  Magnesium (1.6 - 2.3 mg/dL) 1.6  
 
  Total Bilirubin (0.2 - 1.3 mg/dL) 0.4  
 
  AST (14 - 36 U/L) 138  H  
 
  ALT (9 - 52 U/L) 124  H  
 
  Troponin I (< 0.11 ng/ml)  4.20 *H 
 
  Albumin (3.5 - 5.0 g/dL) 2.1  L  
 
Coagulation   
 
  APTT (25 - 37 SEC)   63  H
 
Hematology   
 
  CBC w Diff MAN DIFF ORDERED  
 
  WBC (4.8 - 10.8 /CUMM) 15.5  H  
 
  RBC (4.20 - 5.40 /CUMM) 3.33  L  
 
  Hgb (12.0 - 16.0 G/DL) 8.6  L  
 
  Hct (37 - 47 %) 26.8  L  
 
  MCV (81.0 - 99.0 FL) 80.5  L  
 
  MCH (27.0 - 31.0 PG) 26.0  L  
 
  MCHC (33.0 - 37.0 G/DL) 32.2  L  
 
  RDW (11.5 - 14.5 %) 18.8  H  
 
  Plt Count (130 - 400 /CUMM) 319  
 
  MPV (7.4 - 10.4 FL) 7.2  L  
 
  Gran % (42.2 - 75.2 %) 88.2  H  
 
  Lymphocytes % (20.5 - 51.1 %) 8.3  L  
 
  Monocytes % (1.7 - 9.3 %) 3.4  
 
  Eosinophils % (0 - 5 %) 0  
 
  Basophils % (0.0 - 2.0 %) 0.1  
 
  Absolute Granulocytes (1.4 - 6.5 /CUMM) 13.7  H  
 
  Segmented Neutrophils (42.2 - 75.2 %) 81  H  
 
  Band Neutrophils (0.0 - 5.0 %) 4  
 
  Absolute Lymphocytes (1.2 - 3.4 /CUMM) 1.3  
 
  Lymphocytes (20.5 - 51.1 %) 13  L  
 
  Monocytes (1.7 - 9.3 %) 2  
 
  Absolute Monocytes (0.10 - 0.60 /CUMM) 0.5  
 
  Absolute Eosinophils (0.0 - 0.7 /CUMM) 0  
 
  Absolute Basophils (0.0 - 0.2 /CUMM) 0  
 
  Platelet Estimate (ADEQUATE) ADEQUATE  
 
  Polychromasia 1+  
 
  Hypochromic-Microcytic 1+  
 
  Poikilocytosis 2+  
 
  Basophilic Stippling SLIGHT  
 
  Anisocytosis 1+  
 
  Microcytic Cells 1+  
 
  Ovalocytes 1+  
 
  Stomatocytes 1+  
 
Other Body Source   
 
  Fld Total RBCs Counted (%) 100  
 
 
 
 
 03/19 03/19 03/19 03/19
 
 2255 2255 2240 2235
 
Blood Gas    
 
  pH (7.35 - 7.45 PH)    7.29 *L
 
  pCO2 (35 - 45 TORR)    33  L
 
  pO2 (80 - 100 TORR)    87
 
  HCO3 (21 - 28 MEQ/L)    16  L
 
  ABG O2 Sat (Measured) (>96.0 %)    96.0
 
  P-50 (Temp Corrected)    Y
 
  Carboxyhemoglobin (1.5 - 5.0 %)    0.3  L
 
  O2 Concentration %    65%
 
  Temperature (97.0 - 100.0 FARH)    96.4  L
 
  Respiration Rate (BPM)    30
 
  O2 Delivery Method    ESPRIT
 
  Vent Mode    AC
 
  Expiratory Pressure (CMH2O/P)    5
 
  Tidal Volume (CC)    500
 
Chemistry    
 
  Sodium (137 - 145 mmol/L)  144  
 
  Potassium (3.5 - 5.1 mmol/L)  4.9  
 
  Chloride (98 - 107 mmol/L)  106  
 
  Carbon Dioxide (22 - 30 mmol/L)  17  L  
 
  Anion Gap (5 - 16)  20  H  
 
  BUN (7 - 17 mg/dL)  96  H  
 
  Creatinine (0.5 - 1.0 mg/dL)  6.2 *H  
 
  Estimated GFR (>60 ml/min)  7  L  
 
  Glucose (65 - 99 mg/dL)  328  H  
 
  Lactic Acid (0.7 - 2.1 mmol/L) 3.3  H   
 
  Calcium (8.4 - 10.2 mg/dL)  6.9  L  
 
  Phosphorus (2.5 - 4.5 mg/dL)  8.9  H  
 
  Magnesium (1.6 - 2.3 mg/dL)  1.7  
 
  Total Bilirubin (0.2 - 1.3 mg/dL)  0.4  
 
  AST (14 - 36 U/L)  221  H  
 
  ALT (9 - 52 U/L)  149  H  
 
  Albumin (3.5 - 5.0 g/dL)  2.3  L  
 
Miscellaneous    
 
  Phlebotomy Draw Site    RIGHT BRACHIAL
 
Toxicology    
 
  Urine Opiates Screen (>2000 NG/ML)   < 100 
 
  Methadone Screen (>300 NG/ML)   < 40 
 
  Barbiturate Screen (>200 NG/ML)   < 60 
 
  Ur Phencyclidine Scrn (>25 NG/ML)   < 6.00 
 
  Amphetamines Screen (>1000 NG/ML)   < 100 
 
  U Benzodiazepines Scrn (>200 NG/ML)   < 85 
 
  Urine Cocaine Screen (>300 NG/ML)   < 50 
 
  Urine Cannabis Screen (>50 NG/ML)   < 5.00 
 
 
 
 
 03/19 03/19 03/19 03/19 2057 1931 1931 1710
 
Blood Gas    
 
  pH (7.35 - 7.45 PH)    7.06 *L
 
  pCO2 (35 - 45 TORR)    42
 
  pO2 (80 - 100 TORR)    167  H
 
  HCO3 (21 - 28 MEQ/L)    12  L
 
  ABG O2 Sat (Measured) (>96.0 %)    97.0
 
  P-50 (Temp Corrected)    N
 
  Carboxyhemoglobin (1.5 - 5.0 %)    0.3  L
 
  O2 Concentration %    100%
 
  Temperature (97.0 - 100.0 FARH)    97.5
 
  Respiration Rate (BPM)    26
 
  O2 Delivery Method    ESPRIT VENT
 
  Vent Mode    AC
 
  Expiratory Pressure (CMH2O/P)    5
 
  Tidal Volume (CC)    500
 
Chemistry    
 
  Sodium (137 - 145 mmol/L)   144 
 
  Potassium (3.5 - 5.1 mmol/L)   5.6  H 
 
  Chloride (98 - 107 mmol/L)   108  H 
 
  Carbon Dioxide (22 - 30 mmol/L)   15  L 
 
  Anion Gap (5 - 16)   20  H 
 
  BUN (7 - 17 mg/dL)   95  H 
 
  Creatinine (0.5 - 1.0 mg/dL)   6.3 *H 
 
  Estimated GFR (>60 ml/min)   7  L 
 
  Glucose (65 - 99 mg/dL)   228  H 
 
  Lactic Acid (0.7 - 2.1 mmol/L)  3.8  H  
 
  Calcium (8.4 - 10.2 mg/dL)   7.2  L 
 
  Phosphorus (2.5 - 4.5 mg/dL)   10.7  H 
 
  Magnesium (1.6 - 2.3 mg/dL)   1.7 
 
  Total Bilirubin (0.2 - 1.3 mg/dL)   0.5 
 
  AST (14 - 36 U/L)   253  H 
 
  ALT (9 - 52 U/L)   161  H 
 
  Troponin I (< 0.11 ng/ml) 3.46 *H   
 
  Albumin (3.5 - 5.0 g/dL)   2.3  L 
 
Coagulation    
 
  PT (9.4 - 12.5 SEC)   13.1  H 
 
  INR (0.90 - 1.19)   1.20  H 
 
  APTT (25 - 37 SEC)   28 
 
Miscellaneous    
 
  Phlebotomy Draw Site    RIGHT BRACHIAL
 
 
 
 
 03/19 03/19 03/19
 
 1510 1307 1140
 
Blood Gas   
 
  pH (7.35 - 7.45 PH)   7.29 *L
 
  pCO2 (35 - 45 TORR)   32  L
 
  pO2 (80 - 100 TORR)   238  H
 
  HCO3 (21 - 28 MEQ/L)   15  L
 
  ABG O2 Sat (Measured) (>96.0 %)   98.0
 
  Carboxyhemoglobin (1.5 - 5.0 %)   0.3  L
 
  O2 Concentration %   100%
 
  Respiration Rate (BPM)   24
 
  O2 Delivery Method   BIPAP
 
  Vent Mode   ST
 
  Expiratory Pressure (CM H2O P)   6
 
  Inspiratory Pressure (CM H2O P)   16
 
Chemistry   
 
  Lactic Acid (0.7 - 2.1 mmol/L)  2.0 
 
  Troponin I (< 0.11 ng/ml) 3.04 *H  
 
Miscellaneous   
 
  Phlebotomy Draw Site   RIGHT RADIAL
 
 
 
 
 03/19 03/19
 
 1000 0900
 
Blood Gas  
 
  pH (7.35 - 7.45 PH)  7.23 *L
 
  pCO2 (35 - 45 TORR)  38
 
  pO2 (80 - 100 TORR)  92
 
  HCO3 (21 - 28 MEQ/L)  16  L
 
  ABG O2 Sat (Measured) (>96.0 %)  94.0  L
 
  Carboxyhemoglobin (1.5 - 5.0 %)  0.5  L
 
  O2 Concentration %  100%
 
  O2 Delivery Method  NRB
 
Chemistry  
 
  Sodium (137 - 145 mmol/L) 146  H 
 
  Potassium (3.5 - 5.1 mmol/L) 4.7 
 
  Chloride (98 - 107 mmol/L) 111  H 
 
  Carbon Dioxide (22 - 30 mmol/L) 16  L 
 
  Anion Gap (5 - 16) 18  H 
 
  BUN (7 - 17 mg/dL) 95  H 
 
  Creatinine (0.5 - 1.0 mg/dL) 6.2 *H 
 
  Estimated GFR (>60 ml/min) 7  L 
 
  BUN/Creatinine Ratio (7 - 25 %) 15.3 
 
  Glucose (65 - 99 mg/dL) 86 
 
  Hemoglobin A1c (4.2 - 5.8 %) 8.8  H 
 
  Insulin Level (3.0 - 25.0 mIU/mL) 31.0  H 
 
  Lactic Acid (0.7 - 2.1 mmol/L) 2.3  H 
 
  Calcium (8.4 - 10.2 mg/dL) 8.2  L 
 
  Phosphorus (2.5 - 4.5 mg/dL) 8.8  H 
 
  Magnesium (1.6 - 2.3 mg/dL) 1.8 
 
  Total Bilirubin (0.2 - 1.3 mg/dL) 0.4 
 
  AST (14 - 36 U/L) 29 
 
  ALT (9 - 52 U/L) 28 
 
  Alkaline Phosphatase (<127 U/L) 48 
 
  Creatine Kinase (30 - 135 U/L) 248  H 
 
  Troponin I (< 0.11 ng/ml) 2.31 *H 
 
  Total Protein (6.3 - 8.2 g/dL) 4.9  L 
 
  Albumin (3.5 - 5.0 g/dL) 2.7  L 
 
  Globulin (1.9 - 4.2 gm/dL) 2.2 
 
  Albumin/Globulin Ratio (1.1 - 2.2 %) 1.2 
 
  TSH (0.270 - 4.200 uIU/mL) 9.510  H 
 
  Free T4 (0.78 - 2.44 ng/dL) 0.89 
 
  Prolactin (3.0 - 18.6 ng/mL) 29.0  H 
 
  Cortisol AM Sample (4.46 - 22.7 ug/dL) 26.5  H 
 
Hematology  
 
  CBC w Diff MAN DIFF ORDERED 
 
  WBC (4.8 - 10.8 /CUMM) 16.2  H 
 
  RBC (4.20 - 5.40 /CUMM) 4.33 
 
  Hgb (12.0 - 16.0 G/DL) 11.4  L 
 
  Hct (37 - 47 %) 34.8  L 
 
  MCV (81.0 - 99.0 FL) 80.5  L 
 
  MCH (27.0 - 31.0 PG) 26.3  L 
 
  MCHC (33.0 - 37.0 G/DL) 32.7  L 
 
  RDW (11.5 - 14.5 %) 18.6  H 
 
  Plt Count (130 - 400 /CUMM) 405  H 
 
  MPV (7.4 - 10.4 FL) 6.9  L 
 
  Gran % (42.2 - 75.2 %) 89.2  H 
 
  Lymphocytes % (20.5 - 51.1 %) 9.1  L 
 
  Monocytes % (1.7 - 9.3 %) 0.9  L 
 
  Eosinophils % (0 - 5 %) 0.6 
 
  Basophils % (0.0 - 2.0 %) 0.2 
 
  Absolute Granulocytes (1.4 - 6.5 /CUMM) 14.5  H 
 
  Segmented Neutrophils (42.2 - 75.2 %) 73 
 
  Band Neutrophils (0.0 - 5.0 %) 13  H 
 
  Absolute Lymphocytes (1.2 - 3.4 /CUMM) 1.5 
 
  Lymphocytes (20.5 - 51.1 %) 11  L 
 
  Monocytes (1.7 - 9.3 %) 3 
 
  Absolute Monocytes (0.10 - 0.60 /CUMM) 0.1 
 
  Absolute Eosinophils (0.0 - 0.7 /CUMM) 0.1 
 
  Absolute Basophils (0.0 - 0.2 /CUMM) 0 
 
  Platelet Estimate (ADEQUATE) INCREASED 
 
  Normocytic RBCs VERIFIED 
 
  Normochromic RBCs VERIFIED 
 
  Poikilocytosis FEW 
 
  [place] Cells FEW 
 
Miscellaneous  
 
  Phlebotomy Draw Site  RIGHT BRACHIAL
 
 
 
 
Assessment/Plan
Assessment/Plan
Imp:
     1.  Critically ill patient with difficult pérez placement.  On exam pérez 
appears to be in place but doesn't irrigate well.  Bladder scan is 137 cc
 
 
 
Plan:
     1.  Would leave pérez in place.
     2.  Bladder scan q shift
     3.  If bladder volumes increase options are:  removal of pérez to see if pt
voids spontaneously, another attempt at bedside urethral dilation and pérez 
placemen, attempted pérez placement in OR, or suprapubic tube

## 2018-03-20 NOTE — RADIOLOGY REPORT
EXAMINATION:
XR PORTABLE CHEST
 
CLINICAL INFORMATION:
Intubation.
 
COMPARISON:
Chest x-ray March 19, 2018
 
TECHNIQUE:
Portable frontal view of the chest was obtained. 5:51 AM
 
FINDINGS:
Endotracheal tube measuring 3.6 cm above the kaushal. Nasogastric tube in
stomach.
The patchy bilateral airspace opacities are persistent. Largest area of
consolidation in the right upper lobe.
No significant central pulmonary vascular congestion.
No large pleural effusion. There is no pneumothorax.
 
IMPRESSION:
 
1. Endotracheal tube 3.6 as above kaushal.
2. Nasogastric tube in stomach.
3. Persistent bilateral airspace opacities.

## 2018-03-20 NOTE — PN- NEUROLOGY
Subjective
Subjective:
No new problems overnight.  Currently on no sedation.  Hypothermic protocol 
being held.  Has history of chronic kidney disease and insulin-dependent 
diabetes.  Admitted after being found unresponsive, duration unclear, with a 
blood glucose in the 20s.  She subsequently had an asystole arrest requiring 
approximately 15 minutes of resuscitative efforts.
 
Objective
Vital Signs and I&Os
Vital Signs
 
 
Date Time Temp Pulse Resp B/P B/P Pulse O2 O2 Flow FiO2
 
     Mean Ox Delivery Rate 
 
03/20 1412         40
 
03/20 1200      96 Ventilator 40% 
 
03/20 1149         40
 
03/20 0839         40
 
03/20 0800 100.8 92 30 118/60  98 Ventilator 65% 
 
03/20 0800      98 Ventilator 65% 
 
03/20 0702         65
 
03/20 0648 101.1        
 
03/20 0551 101.3        
 
03/20 0400      99 Ventilator 65% 
 
03/20 0344         65
 
03/20 0105         65
 
03/20 0000 96.6 110 30 128/64  99 Ventilator 65% 
 
03/20 0000      99 Ventilator 65% 
 
03/19 2253         65
 
03/19 2000         75
 
03/19 2000      99 Ventilator 85% 
 
03/19 1950       Ventilator 85% 
 
03/19 1825 100.0 126 18 136/64  100 Ventilator 100% 
 
03/19 1758 99.7 128 18 181/68  100 Ventilator  
 
03/19 1727 100.1 126 20 136/64  100 Ventilator 100% 
 
03/19 1705  165 18 199/80  98 Ventilator 100% 
 
03/19 1623         100
 
03/19 1553 97.9 122 18 152/72  100   
 
03/19 1430  111    93   
 
 
 Intake & Output
 
 
 03/20 1600 03/20 0800 03/20 0000 03/19 1600 03/19 0800 03/19 0000
 
Intake Total  1059 1455 1250  
 
Output Total  32 20 3  
 
Balance  1027 1435 1247  
 
       
 
Intake, IV  1059 1455 1250  
 
Output, Urine  32 20 3  
 
Patient 169 lb  170 lb 160 lb  
 
Weight      
 
Weight   Bed scale Estimated  
 
Measurement      
 
Method      
 
 
Middle-aged female intubated in the intensive care unit.  She was unresponsive 
to verbal command.  She grimaced to noxious stimuli.  She would over breathe the
ventilator.  Pupils were 3 mm and poorly reactive.  The oculocephalic reflex was
absent.  There was a weak corneal reflex on the left.  The right corneal was 
absent.  She had triple flexion responses in the distal lower extremities 
bilaterally to minimal stimuli.
Current Medications:
 Current Medications
 
 
  Sig/Susan Start time  Last
 
Medication Dose Route Stop Time Status Admin
 
Acetaminophen 1,000 MG Q6P PRN 03/19 1445 AC 03/20
 
  IV   0551
 
Ampicillin Sodium/ 1,500 MG Q6 03/19 1800 CAN 
 
Sulbactam Sodium  IV   
 
Sodium Chloride 100 ML    
 
Ampicillin Sodium/ 3,000 MG Q12 03/19 1730 AC 03/20
 
Sulbactam Sodium  IV   1020
 
Sodium Chloride 100 ML    
 
Atropine Sulfate 0 .STK-MED ONE 03/19 1630 DC 
 
  .ROUTE   
 
Dextrose/Sodium  1,000 ML Q10H 03/19 1100 DC 03/19
 
Chloride  IV   1100
 
Dextrose/Water 1,000 ML Q20H 03/19 1630 DC 03/19
 
  IV   1726
 
Etomidate 20 MG ONCE ONE 03/19 1545 DC 03/19
 
  IV 03/19 1546  1619
 
Etomidate 0 .STK-MED ONE 03/19 1545 DC 
 
  IV   
 
Heparin Sodium  0 .STK-MED ONE 03/19 1756 DC 
 
(Porcine)  .ROUTE   
 
Heparin Sodium  4,000 UNIT ONCE ONE 03/19 1730 DC 03/19
 
(Porcine)  IV 03/19 1731  2025
 
Heparin Sodium  25,000 UNIT Q24H 03/19 1730 AC 03/19
 
(Porcine)  IV   2036
 
Sodium Chloride 500 ML    
 
Hydrocortisone  50 MG Q12 03/20 1000 DC 
 
Sodium Succinate  IV   
 
Hydrocortisone  50 MG DAILY 03/20 1000 DC 
 
Sodium Succinate  IV   
 
Hydrocortisone  25 MG BID 03/20 1000 AC 03/20
 
Sodium Succinate  IV   1026
 
Hydrocortisone  100 MG ONE ONE 03/19 1615 DC 03/19
 
Sodium Succinate  IV 03/19 1616  1709
 
Insulin Aspart 0 Q4 03/19 2200 AC 03/20
 
  SC   1026
 
Insulin Detemir 16 UNITS BID 03/20 1000 AC 03/20
 
  SC   1026
 
Insulin Detemir 12 UNITS BID 03/19 2200 DC 03/19
 
  SC   2245
 
Insulin Human Regular 0 Q6 03/19 1440 DC 03/19
 
  SC   1813
 
Levothyroxine Sodium 25 MCG DAILY 03/20 1000 AC 03/20
 
  IV   1022
 
Lorazepam 1 MG Q4P PRN 03/20 1400 AC 
 
  IV   
 
Lorazepam 0 .STK-MED ONE 03/19 1604 DC 
 
  .ROUTE   
 
Non-Formulary  0 SEE ADMIN CRITERIA 03/20 0030 CAN 
 
Medication  ANY   
 
Norepinephrine 8 MG Q24H 03/20 0600 DC 
 
Sodium Chloride 250 ML IV   
 
Norepinephrine 8 MG ONCE ONE 03/19 1830 DC 03/19
 
Sodium Chloride 250 ML IV 03/19 1831  2014
 
Norepinephrine 0 .STK-MED ONE 03/19 1825 DC 
 
  IV   
 
Norepinephrine 0 .STK-MED ONE 03/19 1654 DC 
 
  IV   
 
Norepinephrine 8 MG ONCE ONE 03/19 1645 DC 03/19
 
Sodium Chloride 250 ML IV 03/19 1646  1748
 
Pantoprazole Sodium 40 MG DAILY 03/19 1730 AC 03/20
 
  IV   1018
 
Propofol 1,000 MG CONTINOUS INFUSION 03/20 0030 DC 03/20
 
N/A 1 UNIT IV   0030
 
Sodium Bicarbonate 150 MEQ Q10H 03/20 0300 DC 03/20
 
Dextrose/Water 1,000 ML IV   0325
 
Sodium Bicarbonate 50 MEQ ONCE ONE 03/19 1900 DC 03/19
 
  IV 03/19 1901  2014
 
Sodium Bicarbonate 150 MEQ Q10H 03/19 1900 DC 03/19
 
Dextrose/Water 1,000 ML IV   2100
 
Sodium Bicarbonate 100 MEQ CONTINOUS INFUSION 03/19 1600 DC 
 
Dextrose/Water 1,000 ML IV   
 
Sodium Chloride 500 ML BOLUS ONE 03/19 1615 DC 03/19
 
  IV 03/19 1714  1617
 
Succinylcholine  100 MG ONCE ONE 03/19 1545 DC 03/19
 
Chloride  IV 03/19 1546  1619
 
 
 
 
Assessment/Plan
Assessment:
Debbie currently has minimal brain function following cardiac arrest and 
significant hypoglycemia of unclear duration.
 
Condition is critical and prognosis guarded at best.
Plan:
I have discussed the patient's serious situation with multiple family members.  
We will continue supportive care.  CODE STATUS should be clarified.  Follow-up 
imaging of the brain with CAT scan or MRI would be indicated in the next 24-48 
hours.
 
We will be available to follow up with the ICU team.

## 2018-03-20 NOTE — EVENT NOTE
Event Note
Event Note:
Patient's PVR this afternoon was 190 after irrigation with 40cc. Patient's 
previous bladder scan this morning was 150ml. Patient's pérez bag shows clotted 
frothy bloody output. 
 
Spoke to Dr. Garvin. Per Dr. Garvin, continue checking PVR qshift without 
irrigating at this time. If patient's PVR > 300mL will likely need to remove 
pérez. If patient doesn't have spontaneous voiding, she will likely require 
pérez placement in the OR with Dr. Garvin.
 
Dr. Garvin ([facility]): [phone]

## 2018-03-20 NOTE — PN- DIABETES
Assessment/Plan Diabetes
Assessment:
57 y/o female hx of longstanding DM type 1, chronic renal disease due to 
diabetes and chronic interstitual nephritis and was treated with a couse of 
steroid. As per patient's , patient took prednisone 10 mg last night. Her
glucose level was running low and insulin pump was supposed to be discontinued 
last night.
 
Patient was found to be unresponsive with glucose level was in the 20s. Blood 
work in ER showed inappropriately elevated insulin level.  Her severe 
hypoglycemia most likely was due to insulin overdose.
 
She received hydrocortisone 100 mg iv in ER. Patient was intubated. She was on 
D10 w and pressor. Now her BP and glucose level improved and she has been off on
D10 w and pressor. She is still on bicarb drip in D5 w at 100 ml/hour.
 
According to her insulin pump-- [employer]onic 630 G-- her basal insulin 36.75 units 
per 24 hours. She was put on Levemir 12 units twice a day and Novolog coveage 
every 4 hours. Her FSGs were 335, 390 and 291.
 
In addition, she was put on Levothyroxine 25 mcg iv daily.
 
Plan:
1. increase Levemir to 16 units twice a day;
2. adjust Novolog coverage every 4 hours; detail see the inpatient insulin order
;
3. stress dose steroid--- hydrocortisone 25 mg iv twice a day;
4. monitor FSGs; target of glucose between 100 and 200 mg/dl;
5. monitor vital signs;
please inform me if her FSGs are still not controlled or her IVF will be 
adjusted, then her insulin regimen will be adjusted accordingly.
 
Inpatient Diabetes Orders
Every 4 Hours:
   Bolus Insulin: Novolog
   < 80 mg/dl: no coverage
    mg/dl: no coverage
   101-120 mg/dl: 2 units
   121-150 mg/dl: 2 units
   151-200 mg/dl: 4 units
   201-250 mg/dl: 6 units
   251-300 mg/dl: 8 units
   301-350 mg/dl: 10 units
   351-400 mg/dl: 12 units
   > 400 mg/dl: 14 units
 
Subjective
Subjective:
Patient is intubated and on sedation.
 
Objective
Last 24 Hrs of Vital Signs/I&O
 Vital Signs
 
 
Date Time Temp Pulse Resp B/P B/P Pulse O2 O2 Flow FiO2
 
     Mean Ox Delivery Rate 
 
03/20 1149         40
 
03/20 0839         40
 
03/20 0800 100.8 92 30 118/60  98 Ventilator 65% 
 
03/20 0800      98 Ventilator 65% 
 
03/20 0702         65
 
03/20 0648 101.1        
 
03/20 0551 101.3        
 
03/20 0400      99 Ventilator 65% 
 
03/20 0344         65
 
03/20 0105         65
 
03/20 0000 96.6 110 30 128/64  99 Ventilator 65% 
 
03/20 0000      99 Ventilator 65% 
 
03/19 2253         65
 
03/19 2000         75
 
03/19 2000      99 Ventilator 85% 
 
03/19 1950       Ventilator 85% 
 
03/19 1825 100.0 126 18 136/64  100 Ventilator 100% 
 
03/19 1758 99.7 128 18 181/68  100 Ventilator  
 
03/19 1727 100.1 126 20 136/64  100 Ventilator 100% 
 
03/19 1705  165 18 199/80  98 Ventilator 100% 
 
03/19 1623         100
 
03/19 1553 97.9 122 18 152/72  100   
 
03/19 1430  111    93   
 
03/19 1419  110 24 119/63  97 BIPAP 60% 
 
03/19 1330      96   
 
 
 Intake & Output
 
 
 03/20 1600 03/20 0800 03/20 0000
 
Intake Total  1059 1455
 
Output Total  32 20
 
Balance  1027 1435
 
    
 
Intake, IV  1059 1455
 
Output, Urine  32 20
 
Patient 169 lb  170 lb
 
Weight   
 
Weight   Bed scale
 
Measurement   
 
Method   
 
 
 
 
Findings
Pertinent Lab/Sid Results:
 Laboratory Tests
 
 
 03/20 03/20 03/20 03/20
 
 1115 0835 0835 0655
 
Blood Gas    
 
  pH (7.35 - 7.45 PH)    7.48  H
 
  pCO2 (35 - 45 TORR)    27  L
 
  pO2 (80 - 100 TORR)    219  H
 
  HCO3 (21 - 28 MEQ/L)    20  L
 
  ABG O2 Sat (Measured) (>96.0 %)    99.0
 
  P-50 (Temp Corrected)    N
 
  Carboxyhemoglobin (1.5 - 5.0 %)    0.4  L
 
  O2 Concentration %    .65
 
  Respiration Rate (BPM)    30
 
  O2 Delivery Method    VENT
 
  Vent Mode    A/C
 
  Expiratory Pressure (CMH2O/P)    5
 
  Tidal Volume (CC)    500
 
Chemistry    
 
  Lactic Acid (0.7 - 2.1 mmol/L) Pending  3.4  H 
 
  Troponin I (< 0.11 ng/ml)  3.74 *H  
 
Miscellaneous    
 
  Phlebotomy Draw Site    RIGHT BRACHIAL
 
 
 
 
 03/20 03/20 03/20
 
 0600 0500 0230
 
Chemistry   
 
  Sodium (137 - 145 mmol/L)  145 
 
  Potassium (3.5 - 5.1 mmol/L)  4.6 
 
  Chloride (98 - 107 mmol/L)  105 
 
  Carbon Dioxide (22 - 30 mmol/L)  22 
 
  Anion Gap (5 - 16)  18  H 
 
  BUN (7 - 17 mg/dL)  97  H 
 
  Creatinine (0.5 - 1.0 mg/dL)  6.5 *H 
 
  Estimated GFR (>60 ml/min)  7  L 
 
  Glucose (65 - 99 mg/dL)  295  H 
 
  Insulin Level (3.0 - 25.0 mIU/mL)  88.5  H 
 
  Lactic Acid (0.7 - 2.1 mmol/L)  3.0  H 
 
  Calcium (8.4 - 10.2 mg/dL)  6.7  L 
 
  Phosphorus (2.5 - 4.5 mg/dL)  8.3  H 
 
  Magnesium (1.6 - 2.3 mg/dL)  1.6 
 
  Total Bilirubin (0.2 - 1.3 mg/dL)  0.4 
 
  AST (14 - 36 U/L)  138  H 
 
  ALT (9 - 52 U/L)  124  H 
 
  Troponin I (< 0.11 ng/ml) Cancelled  4.20 *H
 
  Albumin (3.5 - 5.0 g/dL)  2.1  L 
 
Hematology   
 
  CBC w Diff  MAN DIFF ORDERED 
 
  WBC (4.8 - 10.8 /CUMM)  15.5  H 
 
  RBC (4.20 - 5.40 /CUMM)  3.33  L 
 
  Hgb (12.0 - 16.0 G/DL)  8.6  L 
 
  Hct (37 - 47 %)  26.8  L 
 
  MCV (81.0 - 99.0 FL)  80.5  L 
 
  MCH (27.0 - 31.0 PG)  26.0  L 
 
  MCHC (33.0 - 37.0 G/DL)  32.2  L 
 
  RDW (11.5 - 14.5 %)  18.8  H 
 
  Plt Count (130 - 400 /CUMM)  319 
 
  MPV (7.4 - 10.4 FL)  7.2  L 
 
  Gran % (42.2 - 75.2 %)  88.2  H 
 
  Lymphocytes % (20.5 - 51.1 %)  8.3  L 
 
  Monocytes % (1.7 - 9.3 %)  3.4 
 
  Eosinophils % (0 - 5 %)  0 
 
  Basophils % (0.0 - 2.0 %)  0.1 
 
  Absolute Granulocytes (1.4 - 6.5 /CUMM)  13.7  H 
 
  Segmented Neutrophils (42.2 - 75.2 %)  81  H 
 
  Band Neutrophils (0.0 - 5.0 %)  4 
 
  Absolute Lymphocytes (1.2 - 3.4 /CUMM)  1.3 
 
  Lymphocytes (20.5 - 51.1 %)  13  L 
 
  Monocytes (1.7 - 9.3 %)  2 
 
  Absolute Monocytes (0.10 - 0.60 /CUMM)  0.5 
 
  Absolute Eosinophils (0.0 - 0.7 /CUMM)  0 
 
  Absolute Basophils (0.0 - 0.2 /CUMM)  0 
 
  Platelet Estimate (ADEQUATE)  ADEQUATE 
 
  Polychromasia  1+ 
 
  Hypochromic-Microcytic  1+ 
 
  Poikilocytosis  2+ 
 
  Basophilic Stippling  SLIGHT 
 
  Anisocytosis  1+ 
 
  Microcytic Cells  1+ 
 
  Ovalocytes  1+ 
 
  Stomatocytes  1+ 
 
Other Body Source   
 
  Fld Total RBCs Counted (%)  100 
 
 
 
 
 03/20 03/19 03/19 03/19
 
 0230 2255 2255 2240
 
Chemistry    
 
  Sodium (137 - 145 mmol/L)   144 
 
  Potassium (3.5 - 5.1 mmol/L)   4.9 
 
  Chloride (98 - 107 mmol/L)   106 
 
  Carbon Dioxide (22 - 30 mmol/L)   17  L 
 
  Anion Gap (5 - 16)   20  H 
 
  BUN (7 - 17 mg/dL)   96  H 
 
  Creatinine (0.5 - 1.0 mg/dL)   6.2 *H 
 
  Estimated GFR (>60 ml/min)   7  L 
 
  Glucose (65 - 99 mg/dL)   328  H 
 
  Lactic Acid (0.7 - 2.1 mmol/L) 3.2  H 3.3  H  
 
  Calcium (8.4 - 10.2 mg/dL)   6.9  L 
 
  Phosphorus (2.5 - 4.5 mg/dL)   8.9  H 
 
  Magnesium (1.6 - 2.3 mg/dL)   1.7 
 
  Total Bilirubin (0.2 - 1.3 mg/dL)   0.4 
 
  AST (14 - 36 U/L)   221  H 
 
  ALT (9 - 52 U/L)   149  H 
 
  Albumin (3.5 - 5.0 g/dL)   2.3  L 
 
Coagulation    
 
  APTT (25 - 37 SEC) 63  H   
 
Toxicology    
 
  Urine Opiates Screen (>2000 NG/ML)    < 100
 
  Methadone Screen (>300 NG/ML)    < 40
 
  Barbiturate Screen (>200 NG/ML)    < 60
 
  Ur Phencyclidine Scrn (>25 NG/ML)    < 6.00
 
  Amphetamines Screen (>1000 NG/ML)    < 100
 
  U Benzodiazepines Scrn (>200 NG/ML)    < 85
 
  Urine Cocaine Screen (>300 NG/ML)    < 50
 
  Urine Cannabis Screen (>50 NG/ML)    < 5.00
 
 
 
 
 03/19 03/19 03/19 03/19 2235 2057 1931 1931
 
Blood Gas    
 
  pH (7.35 - 7.45 PH) 7.29 *L   
 
  pCO2 (35 - 45 TORR) 33  L   
 
  pO2 (80 - 100 TORR) 87   
 
  HCO3 (21 - 28 MEQ/L) 16  L   
 
  ABG O2 Sat (Measured) (>96.0 %) 96.0   
 
  P-50 (Temp Corrected) Y   
 
  Carboxyhemoglobin (1.5 - 5.0 %) 0.3  L   
 
  O2 Concentration % 65%   
 
  Temperature (97.0 - 100.0 FARH) 96.4  L   
 
  Respiration Rate (BPM) 30   
 
  O2 Delivery Method ESPRIT   
 
  Vent Mode AC   
 
  Expiratory Pressure (CMH2O/P) 5   
 
  Tidal Volume (CC) 500   
 
Chemistry    
 
  Sodium (137 - 145 mmol/L)    144
 
  Potassium (3.5 - 5.1 mmol/L)    5.6  H
 
  Chloride (98 - 107 mmol/L)    108  H
 
  Carbon Dioxide (22 - 30 mmol/L)    15  L
 
  Anion Gap (5 - 16)    20  H
 
  BUN (7 - 17 mg/dL)    95  H
 
  Creatinine (0.5 - 1.0 mg/dL)    6.3 *H
 
  Estimated GFR (>60 ml/min)    7  L
 
  Glucose (65 - 99 mg/dL)    228  H
 
  Lactic Acid (0.7 - 2.1 mmol/L)   3.8  H 
 
  Calcium (8.4 - 10.2 mg/dL)    7.2  L
 
  Phosphorus (2.5 - 4.5 mg/dL)    10.7  H
 
  Magnesium (1.6 - 2.3 mg/dL)    1.7
 
  Total Bilirubin (0.2 - 1.3 mg/dL)    0.5
 
  AST (14 - 36 U/L)    253  H
 
  ALT (9 - 52 U/L)    161  H
 
  Troponin I (< 0.11 ng/ml)  3.46 *H  
 
  Albumin (3.5 - 5.0 g/dL)    2.3  L
 
Coagulation    
 
  PT (9.4 - 12.5 SEC)    13.1  H
 
  INR (0.90 - 1.19)    1.20  H
 
  APTT (25 - 37 SEC)    28
 
Miscellaneous    
 
  Phlebotomy Draw Site RIGHT BRACHIAL   
 
 
 
 
 03/19 03/19 03/19
 
 1710 1510 1307
 
Blood Gas   
 
  pH (7.35 - 7.45 PH) 7.06 *L  
 
  pCO2 (35 - 45 TORR) 42  
 
  pO2 (80 - 100 TORR) 167  H  
 
  HCO3 (21 - 28 MEQ/L) 12  L  
 
  ABG O2 Sat (Measured) (>96.0 %) 97.0  
 
  P-50 (Temp Corrected) N  
 
  Carboxyhemoglobin (1.5 - 5.0 %) 0.3  L  
 
  O2 Concentration % 100%  
 
  Temperature (97.0 - 100.0 FARH) 97.5  
 
  Respiration Rate (BPM) 26  
 
  O2 Delivery Method ESPRIT VENT  
 
  Vent Mode AC  
 
  Expiratory Pressure (CMH2O/P) 5  
 
  Tidal Volume (CC) 500  
 
Chemistry   
 
  Lactic Acid (0.7 - 2.1 mmol/L)   2.0
 
  Troponin I (< 0.11 ng/ml)  3.04 *H 
 
Miscellaneous   
 
  Phlebotomy Draw Site RIGHT BRACHIAL

## 2018-03-20 NOTE — EVENT NOTE
Event Note
Event Note:
Situation
Patient seen to have purposeful movement with agitation and overbreathing the 
ventilator while on hypothermic protocol s/p cardiac arrest
 
Background
58 year old insulin diabetic admitted for unresponsive subsequently developing 
cardiac arrest s/p CPR/ROSC. Insulin pump was reported discontinued yesterday, 
however her insulin level today is found to be high.
 
Assessment
Patient appears to have movement consistent with intact brainsteam and cortical 
function making hypothermic protocol contraindicated due to purposeful movement.
Intensivist Dr. Live notified. This was discussed with nocturnist Dr. Mandel. Hypothermic protocol is to be terminated and propofol started for 
sedation.
 
Plan
-Stop hypothermic protocol, patient temp now 97 degrees
-Start propofol GGT for sedation

## 2018-03-20 NOTE — PN- NEPHROLOGY
Assessment/Plan Nephrology
Assessment:
CKD 5.  s/p hypoglycemia, asp pneumonia and now cardiac arrest.  Oligoanuric. 
Acidosis corrected (now resp alk).   
Would stop bicarbonate drip.
Continue supportive care as you are doing.  
No urgent need for dialysis but will likely need to start later this week.
Discussed with .
Suggestion:
.
 
 
Subjective
Subjective:
Events noted. Pt with V fib arrest last night. Resusitated. Now in ICU 
intubated.  Was cooled for 4 hrs but cooling stopped when she began to move. 
Oligoanuric. Acidosis corrected.  BP okay off pressors. 
 
Objective
Vital Signs and I&Os
 
F Intubated
101.1   110  128/64
Lungs occ ronchi
Cor RRR
Abd soft
Ext neg edema AVF pos bruit
 
 
Results
Pertinent Lab Results:
7.48/ 27/ 219  40% FIO2
 
145 / 105 / 97 /
4.6  / 22  / 6.5\
AG 18
L.A. 3.0

## 2018-03-20 NOTE — PN- RESIDENT CRCU
Rayo BARLOW,Tono 18 0737:
Subjective
HPI/CRCU Issues:
Overnight issues:
Patient remains unresponsive on intubation with sedation. Trops continue to 
trend up. Pérez with frothy blood/urine with poor output. 
 
Vitals:
MAXIMUM TEMPERATURE 101.3, heart rate , sinus tach, respiration rate 30-34
, blood pressure  systolic over  diastolic, saturating at %, 
intubated with tidal volume of 500, FiO2 from 85% now down to 65%, PEEP of 5
 
Intake: , output 52
 
Currently on levo fed, IV heparin drip, IV bicarbonate, propofol
 
Labs:
WBC: 15.5 with 88.2% granulocytes, 81 segmented neutrophils, 4 bands (down from 
16.2 and 13 bands), H&H 8.6 and 26.8 (down from 11.4 and 34.8), platelets 319 (
down from 405)
 
Sodium 145, potassium 4.6, chloride 105, bicarbonate 22, anion gap: 18 down from
20, BUN 97, creatinine 6.5, glucose 295
 
Hemoglobin A1c: 8.8
Accuchecks: 200s to 300s. 
Lactic acid: 3.0 (down from 3.2)
 
Calcium: 6.7, albumin 2.1, corrected calcium 8.2
 
Phosphorus 8.3, magnesium 1.6, T bili, 0.4, AST: 138 (down from 221), ALT: 124 (
down from 149)
 
Troponin: 2.31, 3.04, 3.46, 4.20
 
Toxicology screen: Negative
 
Chest x-ray:
1. Endotracheal tube 3.6 as above kaushal.
2. Nasogastric tube in stomach.
3. Persistent bilateral airspace opacities.
 
Objective
 
Vital Signs & I&O
Last 8 Hrs of Vitals and I&O:
 Vital Signs
 
 
Date Time Temp Pulse Resp B/P B/P Pulse O2 O2 Flow FiO2
 
     Mean Ox Delivery Rate 
 
 1600 98.8 98 20 126/60  98 Ventilator 40% 
 
 1600      98 Ventilator 40% 
 
 1540         40
 
 1412         40
 
 1200      96 Ventilator 40% 
 
 1149         40
 
 0839         40
 
 0800 100.8 92 30 118/60  98 Ventilator 65% 
 
 0800      98 Ventilator 65% 
 
 0702         65
 
 0648 101.1        
 
 0551 101.3        
 
03/20 0400      99 Ventilator 65% 
 
 0344         65
 
 0105         65
 
 0000 96.6 110 30 128/64  99 Ventilator 65% 
 
 0000      99 Ventilator 65% 
 
 2253         65
 
 2000         75
 
 2000      99 Ventilator 85% 
 
 1950       Ventilator 85% 
 
 1825 100.0 126 18 136/64  100 Ventilator 100% 
 
 
 Intake & Output
 
 
  1600  0800  0000
 
Intake Total 500 1059 1455
 
Output Total  32 20
 
Balance 500 1027 1435
 
    
 
Intake,  1059 1455
 
Output, Urine  32 20
 
Patient 169 lb  170 lb
 
Weight   
 
Weight   Bed scale
 
Measurement   
 
Method   
 
 
 Intake & Output
 
 
  1600
 
Intake Total 500
 
Output Total 
 
Balance 500
 
  
 
Intake, 
 
Patient 169 lb
 
Weight 
 
 
 
 
Exam
General Appearance: intubated, sedation turned off this am, patient moving legs 
spontaneously, not responsive to verbal/tactile/painful stimuli
Head: atraumatic, normal appearance
Respiratory: lungs clear (on anterior auscultation)
Cardiovascular: regular rate/rhythm
Gastrointestinal: normal bowel sounds, soft, non-tender
Extremities: normal inspection, no edema
Cranial Nerves: pupils were round, reactive to light however sluggish
Skin: intact, warm/dry
Skin Temp/Moisture Exam: Warm/Dry
 
IV Drips
IV Drips:
IV heparin 
Current Medications:
 Current Medications
 
 
  Sig/Susan Start time  Last
 
Medication Dose Route Stop Time Status Admin
 
Acetaminophen 1,000 MG Q6P PRN  1445 AC 
 
  IV   0551
 
Ampicillin Sodium/ 3,000 MG Q12  1730 AC 
 
Sulbactam Sodium  IV   1020
 
Sodium Chloride 100 ML    
 
Dextrose/Water 1,000 ML Q20H  1630 DC 
 
  IV   1726
 
Heparin Sodium  25,000 UNIT Q24H  1730 AC 
 
(Porcine)  IV   2036
 
Sodium Chloride 500 ML    
 
Hydrocortisone  50 MG Q12  1000 DC 
 
Sodium Succinate  IV   
 
Hydrocortisone  50 MG DAILY  1000 DC 
 
Sodium Succinate  IV   
 
Hydrocortisone  25 MG BID  1000 AC 
 
Sodium Succinate  IV   1026
 
Insulin Aspart 0 Q4  2200 AC 
 
  SC   1529
 
Insulin Detemir 16 UNITS BID  1000 AC 
 
  SC   1026
 
Insulin Detemir 12 UNITS BID  2200 DC 
 
  SC   2245
 
Insulin Human Regular 0 Q6  1440 DC 
 
  SC   1813
 
Levothyroxine Sodium 25 MCG DAILY  1000 AC 
 
  IV   1022
 
Lorazepam 1 MG Q4P PRN  1400 AC 
 
  IV   
 
Magnesium Citrate 300 ML ONE ONE  1615 CAN 
 
  PO  1616  
 
Magnesium Sulfate 1 GM ONCE ONE  1645 AC 
 
Dextrose/Water 100 ML IV  2044  1720
 
Non-Formulary  0 SEE ADMIN CRITERIA  0030 CAN 
 
Medication  ANY   
 
Norepinephrine 8 MG Q24H  0600 DC 
 
Sodium Chloride 250 ML IV   
 
Norepinephrine 8 MG ONCE ONE  1830 DC 
 
Sodium Chloride 250 ML IV  [id number]
 
Norepinephrine 0 .STK-MED ONE  1825 DC 
 
  IV   
 
Pantoprazole Sodium 40 MG DAILY  1730 AC 
 
  IV   1018
 
Propofol 1,000 MG CONTINOUS INFUSION  0030 DC 
 
N/A 1 UNIT IV   0030
 
Scopolamine HBr 1 PAT Q72H PRN  1600 AC 
 
  TOP   1640
 
Sodium Bicarbonate 150 MEQ Q10H  0300 DC 
 
Dextrose/Water 1,000 ML IV   0325
 
Sodium Bicarbonate 50 MEQ ONCE ONE  1900 DC 
 
  IV  [id number]
 
Sodium Bicarbonate 150 MEQ Q10H  1900 DC 
 
Dextrose/Water 1,000 ML IV   2100
 
Sodium Bicarbonate 100 MEQ CONTINOUS INFUSION  1600 DC 
 
Dextrose/Water 1,000 ML IV   
 
 
 
Antibiotics
   Day #: 2
   IV/PO? IV
 
Impression/Plan
 
Impression/Problem List
Impression:
This is 58-year-old female with a medical history of stage 5 CKD(nephrotic 
proteinuria, diabetic nephropathy and retinopathy) with left AV fistula placed 
couple month ago(still making urine), currently not on hemodialysis, has only 
one kidney since childhood.  Chronic anemia, hypertension, type 1 diabetes on 
insulin pump, hypothyroidism, ankylosing spondylitis on chronic prednisone, 
depression, GERD, right lung mass according to the family the biopsy came back 
as a benign, hyperparathyroidism due to renal insufficiency, chronic pain.  
Presented to the emergency department via EMS due to unresponsiveness.
 
 
Patient is admitted to the ICU for management of the following: 
 
Respiratory: 
 
Acute hypoxic respiratory failure 2/2 to aspiration pneumonia on mechanical 
ventilation: 
Patient continues to remain on mechanical ventilation. Oxygen requirement has 
improved with decreased FiO2. Patient's sedation with propofol was shut off this
morning. 
- continue mechanical ventilation at decreased respiratory rate of 20. 
- ativan 1mg q4h PRN for agitation 
- Repeat ABG and chest x-ray in a.m.
 
Infection: 
Aspiration Pneumonia: 
Patient continues to be tachycardic, febrile with elevated leukocytosis which is
downtrending on IV Unasyn.  No growth on cultures thus far.  Repeat chest xray 
shows persistent bilateral airspace opacities with largest area of consolidation
in the right upper lobe. Patient remains on ventilatory support.
 
- continue IV Unasyn 
- continue to monitor CBCs, vitals
- Follow-up blood cultures, sputum culture
- ID on board.  Appreciate recommendations.
 
Cardiac:
Status post cardiac arrest in the ED
New right bundle branch block, wide QRS tachycardia
NSTEMI- likely 2/2 to demand ischemia
Patient's troponin's peaked to 4.20. Patient remains unresponsive on mechanical 
ventilator. Patient's heparin was temporarily stopped due to concerns of 
bleeding from pérez site. Patient was seen by cardiology. H/H was stable and 
patient's heparin was resumed.
- continue IV heparin 
- continue aspirin and statin
 
Heme: 
 
Anemia
Patient has a history of chronic anemia. Patient had bleeding in the pérez and 
around the pérez site. H/H was monitored with no acute drop. 
- continue to monitor H/H 
 
Metabolic: 
 
Type 1 Diabetes - on Insulin Pump
Patient initially presented with hypoglycemia w/ BG of 27. Patients BG over the 
past 24 hours was in the high 200s to high 300s. 
- Endocrinology on board. Appreciate recommendations 
- Increased levemir to 16 units BID 
- Insulin SS adjusted per endocrinology recommendations 
- Target BG per endocrinology: FSG of 100 to 200. 
 
Adrenal Insufficiency. Patient is on chronic prednisone for interstitital 
nephritis.
- Stress dose steroids with IV hydrocortisone 25mg IV BID 
 
Metabolic acidosis with elevated anion gap and lactic acidosis 
Patient's anion gap is down from previous day. Lactic acid was previously 
fluctuating however is now downtrending. Patient was previously getting IV 
Bicarb. 
- continue to monitor 
- repeat lactic acid until it normalizes 
- IV bicarb discontinued due to ABG showing alkalosis with pH of 7.48
 
Alimentary:
 
Patient is currently NPO due to intubation.
Fluids are being given cautiously due to ECHO showing LVEF of 30%. 
 
Neurology:
 
Unresponsiveness - likely multifactorial at this point. Initially secondary to 
severe hypoglycemia. Patient likely had a seizure prior to arrival as she was 
found to have urinary and bladder incontinence and had an elevated prolactin 
level on admission. Patient was also found to have vomitted and aspirated. In 
the ED on day of admision patient had a cardiac arrest for approximately 10 
minutes. Patient had a head CT which was nondiagnostic due to motion artifact 
therefore bleed, hypoxic brain injury or infarction cannot be ruled out.
- neurology is on board. appreciate recommendations 
- will require further imaging with CT or MRI
- an EEG may be prudent to rule out seizures 
- continue neurochecks
- continue to watch off sedation 
- continue aspiration protocol 
- continue aspirin 
- continue to monitor and replete electrolytes 
 
Nephrology:
 
ESRD w/maturing left sided AV fistula: 
Patient has a history of ESRD with biopsy showing chronic intersitial nephritis 
and diabetic nephropathy. Patient was placed on a course of steroids which were 
tapered off. Patient has been evaluated by nephrology today. IV Bicarb was 
discontinued. Patient will not need dialysis yet per nephrology. Patient is 
currently oliguric.
- continue to monitor electrolytes 
- continue to monitor I/O 
- nephrology consulted. appreciate recommendations 
 
 
Urology: 
Urethral stricture. Nursing was unable to placed pérez. Urology was consulted 
and placed a 16 [place] pérez after urethral dilation. Patient's pérez appears to
be obstructed today with what appears to be clotted frothy blood in the bag and 
area around the pérez with bleeding and showing leakage. Irrigation today did 
not improve the patency of the pérez. Patient continues to have elevated PVR. 
Urology was contacted today. 
- Will continue to keep pérez in place at this time
- Remove pérez if PVR > 300
- PVR qshift 
- No bladder irrigation at this time per urology
- If pérez is removed pending PVR, watch to see if patient has spontaneous 
voiding. If she does not, patient may require pérez placement in the OR. 
 
DVT PPx: 
On IV Heparin 
 
Code: 
Full code 
Problem List:
 1. Hypoglycemia
 
 2. Metabolic acidosis
 
 3. NSTEMI (non-ST elevated myocardial infarction)
 
 4. End stage renal disease
 
 5. Aspiration pneumonia due to gastric secretions
 
Pain Ratin
Tomorrow's Labs & Rationales:
cbc
cmp 
 
 
Plan
DVT/Prophylaxis: mechanical, pharmacological
 
 
Mike Haddad MD 18 0949:
Attending MD Review Statement
 
Attending Sign Off
Attending Cosign Statement:
I have: examined this patient, reviewed avalbl EMR data, personally reviewd 
images, discussd w/resident/PA/NP, discussed mgmt plan w/josé, discussed mgmt 
plan w/CM, discussed mgmt plan w/pt, agreed w/resident/PA/NP, amended to note. 
Other Findings:
Impression
58 year old woman
 
* s/p asystolic arrest, likely/presumed secondary to insulin overdose resulting 
in hypolglycemia
* CKD
* unresponsiveness
* hypoxemic respiratory failure
* lung nodule s/p non diagnostic biopsy
* leukocytosis with likely aspiration
 
Plan
Respiratory
-mechanical ventilation to be continued
-abg corrected
-reduce RR to 20
-lung nodule was non-diagnositic, to be followed in future if appropriate
-dc bicarb
 
ID
-f/u ID, unsayn for now
 
[facility]
-monitor hemodynamics
-f/u cardiology recs
 
Heme
-f/u cbc, coags
 
Metabolic
-f/u endocrine/renal recs
-monitor ins/outs
-monitor electrolytes
 
Alimentary
-NPO for now
-finger sticks
 
Neuro
-neurology appreciated
-hypothermia aborted secondary to spontantaneous movements
 
DVT prophylaxis at all times
 
TTS 45 min

## 2018-03-21 VITALS — SYSTOLIC BLOOD PRESSURE: 144 MMHG | DIASTOLIC BLOOD PRESSURE: 68 MMHG

## 2018-03-21 VITALS — DIASTOLIC BLOOD PRESSURE: 62 MMHG | SYSTOLIC BLOOD PRESSURE: 160 MMHG

## 2018-03-21 VITALS — DIASTOLIC BLOOD PRESSURE: 64 MMHG | SYSTOLIC BLOOD PRESSURE: 145 MMHG

## 2018-03-21 VITALS — DIASTOLIC BLOOD PRESSURE: 64 MMHG | SYSTOLIC BLOOD PRESSURE: 146 MMHG

## 2018-03-21 LAB
ABSOLUTE GRANULOCYTE CT: 13 /CUMM (ref 1.4–6.5)
APTT BLD: 50 SEC (ref 25–37)
APTT BLD: 66 SEC (ref 25–37)
BASOPHILS # BLD: 0 /CUMM (ref 0–0.2)
BASOPHILS NFR BLD: 0.2 % (ref 0–2)
EOSINOPHIL # BLD: 0 /CUMM (ref 0–0.7)
EOSINOPHIL NFR BLD: 0 % (ref 0–5)
ERYTHROCYTE [DISTWIDTH] IN BLOOD BY AUTOMATED COUNT: 19.3 % (ref 11.5–14.5)
GRANULOCYTES NFR BLD: 86.5 % (ref 42.2–75.2)
HCT VFR BLD CALC: 26.8 % (ref 37–47)
LYMPHOCYTES # BLD: 1.6 /CUMM (ref 1.2–3.4)
MCH RBC QN AUTO: 26.5 PG (ref 27–31)
MCHC RBC AUTO-ENTMCNC: 32.6 G/DL (ref 33–37)
MCV RBC AUTO: 81.3 FL (ref 81–99)
MONOCYTES # BLD: 0.4 /CUMM (ref 0.1–0.6)
PLATELET # BLD: 352 /CUMM (ref 130–400)
PMV BLD AUTO: 7.5 FL (ref 7.4–10.4)
RED BLOOD CELL CT: 3.3 /CUMM (ref 4.2–5.4)
WBC # BLD AUTO: 15 /CUMM (ref 4.8–10.8)

## 2018-03-21 NOTE — PN- DIABETES
Assessment/Plan Diabetes
Assessment:
59 y/o female hx of longstanding DM type 1, chronic renal disease due to 
diabetes and chronic interstitual nephritis and was treated with a couse of 
steroid. 
 
Patient was found to be unresponsive with glucose level was in the 20s. Blood 
work in ER showed inappropriately elevated insulin level.  Her severe 
hypoglycemia most likely was due to insulin overdose.
 
She received hydrocortisone 100 mg iv in ER. Patient was intubated. She was on 
D10 w and pressor. Now her BP and glucose level improved and she has been off on
D10 w and pressor. 
 
According to her insulin pump-- Medtronic 630 G-- her basal insulin 36.75 units 
per 24 hours. 
 
Currently she is on Levemir 16 units twice a day and Novolog coveage every 4 
hours. Her FSGs were 102, 114, 132 and 109.
 
In addition, she was put on Levothyroxine 25 mcg iv daily.
 
 
Plan:
1. decrease Levemir to 12 units twice a day;
2. adjust Novolog coverage every 4 hours; detail see the inpatient DM order;
3. monitor FSGs and electrolytes.
4. continue hydrocortisone 25 mg iv twice a day for now.
will follow.
 
Inpatient Diabetes Orders
Every 4 Hours:
   [facility] Insulin: Novolog
   < 80 mg/dl: no coverage
    mg/dl: no coverage
   101-120 mg/dl: no coverage
   121-150 mg/dl: no coverage
   151-200 mg/dl: 2 units
   201-250 mg/dl: 4 units
   251-300 mg/dl: 6 units
   301-350 mg/dl: 8 units
   351-400 mg/dl: 10 units
   > 400 mg/dl: 12 units
 
Subjective
Subjective:
Patient remains intubated.
 
Objective
Last 24 Hrs of Vital Signs/I&O
 Vital Signs
 
 
Date Time Temp Pulse Resp B/P B/P Pulse O2 O2 Flow FiO2
 
     Mean Ox Delivery Rate 
 
03/21 1200      96 Ventilator 30% 
 
03/21 1138         30
 
03/21 0809         30
 
03/21 0800 98.2 107 20 146/64  99 Ventilator 40% 
 
03/21 0800      99 Ventilator 40% 
 
03/21 0644         40
 
03/21 0400      98 Ventilator 40% 
 
03/21 0301         40
 
03/21 0046         40
 
03/21 0000 100.4 106 20 144/68  98 Ventilator 40% 
 
03/21 0000      98 Ventilator 40% 
 
03/20 2233         40
 
03/20 2000      99 Ventilator 40% 
 
03/20 1930         40
 
03/20 1600 98.8 98 20 126/60  98 Ventilator 40% 
 
03/20 1600      98 Ventilator 40% 
 
03/20 1540         40
 
 
 Intake & Output
 
 
 03/21 1600 03/21 0800 03/21 0000
 
Intake Total  175 400
 
Output Total   
 
Balance  175 400
 
    
 
Intake, IV  175 400
 
Patient 169 lb  
 
Weight   
 
 
 
 
Findings
Pertinent Lab/Sid Results:
 Laboratory Tests
 
 
 03/21 03/21
 
 1005 0620
 
Blood Gas  
 
  pH (7.35 - 7.45 PH)  7.43
 
  pCO2 (35 - 45 TORR)  30  L
 
  pO2 (80 - 100 TORR)  147  H
 
  HCO3 (21 - 28 MEQ/L)  19  L
 
  ABG O2 Sat (Measured) (>96.0 %)  98.0
 
  P-50 (Temp Corrected)  N
 
  Carboxyhemoglobin (1.5 - 5.0 %)  0.3  L
 
  O2 Concentration %  .40
 
  Respiration Rate (BPM)  20
 
  O2 Delivery Method  VENT
 
  Vent Mode  A/C
 
  Expiratory Pressure (CMH2O/P)  5
 
  Tidal Volume (CC)  500
 
Coagulation  
 
  APTT (25 - 37 SEC) 66  H 
 
Miscellaneous  
 
  Phlebotomy Draw Site  RIGHT BRACHIAL
 
 
 
 
 03/21 03/20
 
 0400 2200
 
Chemistry  
 
  Sodium (137 - 145 mmol/L) 149  H 
 
  Potassium (3.5 - 5.1 mmol/L) 4.1 
 
  Chloride (98 - 107 mmol/L) 106 
 
  Carbon Dioxide (22 - 30 mmol/L) 21  L 
 
  Anion Gap (5 - 16) 22  H 
 
  BUN (7 - 17 mg/dL) 102 *H 
 
  Creatinine (0.5 - 1.0 mg/dL) 7.6 *H 
 
  Estimated GFR (>60 ml/min) 5  L 
 
  Glucose (65 - 99 mg/dL) 109  H 
 
  Calcium (8.4 - 10.2 mg/dL) 7.8  L 
 
  Phosphorus (2.5 - 4.5 mg/dL) 8.4  H 
 
  Magnesium (1.6 - 2.3 mg/dL) 2.0 
 
  Total Bilirubin (0.2 - 1.3 mg/dL) 0.7 
 
  AST (14 - 36 U/L) 65  H 
 
  ALT (9 - 52 U/L) 89  H 
 
  Albumin (3.5 - 5.0 g/dL) 2.4  L 
 
Coagulation  
 
  APTT (25 - 37 SEC)  64  H
 
Hematology  
 
  CBC w Diff MAN DIFF ORDERED 
 
  WBC (4.8 - 10.8 /CUMM) 15.0  H 
 
  RBC (4.20 - 5.40 /CUMM) 3.30  L 
 
  Hgb (12.0 - 16.0 G/DL) 8.8  L 
 
  Hct (37 - 47 %) 26.8  L 
 
  MCV (81.0 - 99.0 FL) 81.3 
 
  MCH (27.0 - 31.0 PG) 26.5  L 
 
  MCHC (33.0 - 37.0 G/DL) 32.6  L 
 
  RDW (11.5 - 14.5 %) 19.3  H 
 
  Plt Count (130 - 400 /CUMM) 352 
 
  MPV (7.4 - 10.4 FL) 7.5 
 
  Gran % (42.2 - 75.2 %) 86.5  H 
 
  Lymphocytes % (20.5 - 51.1 %) 10.5  L 
 
  Monocytes % (1.7 - 9.3 %) 2.8 
 
  Eosinophils % (0 - 5 %) 0 
 
  Basophils % (0.0 - 2.0 %) 0.2 
 
  Absolute Granulocytes (1.4 - 6.5 /CUMM) 13.0  H 
 
  Segmented Neutrophils (42.2 - 75.2 %) 84  H 
 
  Band Neutrophils (0.0 - 5.0 %) 5 
 
  Absolute Lymphocytes (1.2 - 3.4 /CUMM) 1.6 
 
  Lymphocytes (20.5 - 51.1 %) 9  L 
 
  Monocytes (1.7 - 9.3 %) 2 
 
  Absolute Monocytes (0.10 - 0.60 /CUMM) 0.4 
 
  Absolute Eosinophils (0.0 - 0.7 /CUMM) 0 
 
  Absolute Basophils (0.0 - 0.2 /CUMM) 0 
 
  Platelet Estimate (ADEQUATE) ADEQUATE 
 
  Polychromasia 1+ 
 
  Anisocytosis 1+ 
 
  Target Cells FEW 
 
  Stomatocytes FEW 
 
  [place] Cells RARE 
 
  Elliptocytes 1+ 
 
Serology  
 
  Hep Bs Antigen (NONREACTIVE) NONREACTIVE 
 
  Hep Bs Antibody (NONREACTIVE) NONREACTIVE 
 
 
 
 
 03/20
 
 1445
 
Chemistry 
 
  Lactic Acid (0.7 - 2.1 mmol/L) 2.0
 
Coagulation 
 
  APTT (25 - 37 SEC) 39  H
 
Hematology 
 
  CBC w Diff MAN DIFF ORDERED
 
  WBC (4.8 - 10.8 /CUMM) 14.2  H
 
  RBC (4.20 - 5.40 /CUMM) 3.30  L
 
  Hgb (12.0 - 16.0 G/DL) 8.6  L
 
  Hct (37 - 47 %) 26.3  L
 
  MCV (81.0 - 99.0 FL) 79.5  L
 
  MCH (27.0 - 31.0 PG) 26.2  L
 
  MCHC (33.0 - 37.0 G/DL) 33.0
 
  RDW (11.5 - 14.5 %) 18.7  H
 
  Plt Count (130 - 400 /CUMM) 338
 
  MPV (7.4 - 10.4 FL) 7.1  L
 
  Gran % (42.2 - 75.2 %) 85.9  H
 
  Lymphocytes % (20.5 - 51.1 %) 10.7  L
 
  Monocytes % (1.7 - 9.3 %) 3.2
 
  Eosinophils % (0 - 5 %) 0
 
  Basophils % (0.0 - 2.0 %) 0.2
 
  Absolute Granulocytes (1.4 - 6.5 /CUMM) 12.2  H
 
  Segmented Neutrophils (42.2 - 75.2 %) 82  H
 
  Band Neutrophils (0.0 - 5.0 %) 7  H
 
  Absolute Lymphocytes (1.2 - 3.4 /CUMM) 1.5
 
  Lymphocytes (20.5 - 51.1 %) 8  L
 
  Monocytes (1.7 - 9.3 %) 3
 
  Absolute Monocytes (0.10 - 0.60 /CUMM) 0.4
 
  Absolute Eosinophils (0.0 - 0.7 /CUMM) 0
 
  Absolute Basophils (0.0 - 0.2 /CUMM) 0
 
  Platelet Estimate (ADEQUATE) ADEQUATE
 
  Polychromasia   
 
  Hypochromic-Microcytic 1+
 
  Anisocytosis 1+
 
  Microcytic Cells 1+

## 2018-03-21 NOTE — CT SCAN REPORT
EXAMINATION:
CT CHEST WITHOUT CONTRAST
 
CLINICAL INFORMATION:
Lung mass.
 
COMPARISON:
Chest x-rays earlier 3/21/2018 and 3/20/2018. CT scan of the chest 01/19/2018.
 
TECHNIQUE:
Multidetector volumetric CT imaging of the chest was done. Axial MIP volume
rendering provided. Sagittal and coronal reformatted images were obtained.
 
DLP:
485.4 mGy-cm
 
FINDINGS:
 
: There are partially visualized enteric and endotracheal tubes. There
is hazy opacity in the right upper zone medially.
 
LUNGS: The study redemonstrates the lobulated relatively well-defined mass in
the right upper lobe medially abutting the superior mediastinum which
measures 3.9 x 2.5 cm which is not significantly changed compared to prior
imaging taking differences in slice selection into account. There are now
multiple new patchy areas of groundglass opacification with some nodularity
in the right upper lobe posteriorly, in the superior segments of the lower
lobes bilaterally with areas of more confluent opacification in the left
lower lobe.
 
MEDIASTINUM: The central airways are patent. The thyroid gland has
heterogenous density with an area of low attenuation in the left lobe. There
is no hilar or mediastinal lymphadenopathy. As described above there are
endotracheal and enteric tubes in position. The heart is normal in size.
There is no pericardial effusion. There are mild atheromatous calcifications
of the coronary arteries.
 
PLEURA: There is no pleural effusion. No pleural mass or thickening.
 
AXILLA: There is no axillary lymphadenopathy. There are no masses in the
chest wall. There are areas of edema in the lateral chest walls bilaterally.
 
 
UPPER ABDOMEN: The patient is status post cholecystectomy. The visualized
right kidney appears atrophic. The adrenal glands are not enlarged.
 
OSSEOUS STRUCTURES: There are multilevel spondylitic changes in the thoracic
spine with narrowing of intervertebral disc height and marginal osteophytes.
 
IMPRESSION:
1. The lobulated mass in the right upper lobe medially is not significantly
changed in size.
 
2. There are now patchy areas of ground glass opacification nodularity in
both lungs, which may be consistent with multifocal pneumonic infiltrates.

## 2018-03-21 NOTE — CT SCAN REPORT
EXAMINATION:
CT HEAD WITHOUT CONTRAST
 
CLINICAL INFORMATION:
Unresponsiveness. Evaluate for hemorrhage or hypoxic brain injury. Stroke.
 
COMPARISON:
CT scan of the head 03/19/2018.
 
TECHNIQUE:
Contiguous axial imaging was performed from the skull base to vertex without
intravenous administration of contrast.
 
DLP:
610.91 mGy-cm
 
FINDINGS:
There is relatively extensive loss of gray-white matter differentiation
primarily involving the basal ganglia, right insular cortex, and both
temporal lobes. There is some preservation of gray-white matter
differentiation for instance within the interhemispheric fissure near the
vertex, both frontal lobes, and left insular cortex. Cytotoxic edema causes
regional sulcal effacement. No midline shift or hydrocephalus. There is no
acute intracranial hemorrhage and no abnormal extra-axial collection. The
calvarium and skull base are intact. There is a trace right mastoid tip
effusion. Moderate to severe paranasal sinus disease.
 
IMPRESSION:
There is relatively extensive loss of gray-white matter differentiation
primarily involving the basal ganglia, right insular cortex, and both
temporal lobes. These findings are consistent with hypoxic ischemic injury.
No acute hemorrhage.

## 2018-03-21 NOTE — PN- INFECT DX
Subjective
Subjective:
MAXIMUM TEMPERATURE 100.8 on steroids.  She remains unresponsive.
 
Objective
Last 24 Hrs of Vital Signs/I&O
 Vital Signs
 
 
Date Time Temp Pulse Resp B/P B/P Pulse O2 O2 Flow FiO2
 
     Mean Ox Delivery Rate 
 
03/21 0809         30
 
03/21 0800 98.2 107 20 146/64  99 Ventilator 40% 
 
03/21 0644         40
 
03/21 0400      98 Ventilator 40% 
 
03/21 0301         40
 
03/21 0046         40
 
03/21 0000 100.4 106 20 144/68  98 Ventilator 40% 
 
03/21 0000      98 Ventilator 40% 
 
03/20 2233         40
 
03/20 2000      99 Ventilator 40% 
 
03/20 1930         40
 
03/20 1600 98.8 98 20 126/60  98 Ventilator 40% 
 
03/20 1600      98 Ventilator 40% 
 
03/20 1540         40
 
03/20 1412         40
 
03/20 1200      96 Ventilator 40% 
 
03/20 1149         40
 
 
 Intake & Output
 
 
 03/21 1600 03/21 0800 03/21 0000
 
Intake Total  175 400
 
Output Total   
 
Balance  175 400
 
    
 
Intake, IV  175 400
 
 
 
 
Physical Exam
Other Physical Findings:
She is unresponsive on the ventilator
Lungs are clear
Heart regular rhythm with no murmur
Abdomen is soft, with positive bowel sounds
Extremities right femoral triple lumen catheter remains in place; left upper 
extremity fistula with a positive bruit and thrill
 
 
Results
Last 24 Hours of Lab Results:
 Laboratory Tests
 
 
 03/21 03/21
 
 0620 0400
 
Blood Gas  
 
  pH (7.35 - 7.45 PH) 7.43 
 
  pCO2 (35 - 45 TORR) 30  L 
 
  pO2 (80 - 100 TORR) 147  H 
 
  HCO3 (21 - 28 MEQ/L) 19  L 
 
  ABG O2 Sat (Measured) (>96.0 %) 98.0 
 
  P-50 (Temp Corrected) N 
 
  Carboxyhemoglobin (1.5 - 5.0 %) 0.3  L 
 
  O2 Concentration % .40 
 
  Respiration Rate (BPM) 20 
 
  O2 Delivery Method VENT 
 
  Vent Mode A/C 
 
  Expiratory Pressure (CMH2O/P) 5 
 
  Tidal Volume (CC) 500 
 
Chemistry  
 
  Sodium (137 - 145 mmol/L)  149  H
 
  Potassium (3.5 - 5.1 mmol/L)  4.1
 
  Chloride (98 - 107 mmol/L)  106
 
  Carbon Dioxide (22 - 30 mmol/L)  21  L
 
  Anion Gap (5 - 16)  22  H
 
  BUN (7 - 17 mg/dL)  102 *H
 
  Creatinine (0.5 - 1.0 mg/dL)  7.6 *H
 
  Estimated GFR (>60 ml/min)  5  L
 
  Glucose (65 - 99 mg/dL)  109  H
 
  Calcium (8.4 - 10.2 mg/dL)  7.8  L
 
  Phosphorus (2.5 - 4.5 mg/dL)  8.4  H
 
  Magnesium (1.6 - 2.3 mg/dL)  2.0
 
  Total Bilirubin (0.2 - 1.3 mg/dL)  0.7
 
  AST (14 - 36 U/L)  65  H
 
  ALT (9 - 52 U/L)  89  H
 
  Albumin (3.5 - 5.0 g/dL)  2.4  L
 
Hematology  
 
  CBC w Diff  MAN DIFF ORDERED
 
  WBC (4.8 - 10.8 /CUMM)  15.0  H
 
  RBC (4.20 - 5.40 /CUMM)  3.30  L
 
  Hgb (12.0 - 16.0 G/DL)  8.8  L
 
  Hct (37 - 47 %)  26.8  L
 
  MCV (81.0 - 99.0 FL)  81.3
 
  MCH (27.0 - 31.0 PG)  26.5  L
 
  MCHC (33.0 - 37.0 G/DL)  32.6  L
 
  RDW (11.5 - 14.5 %)  19.3  H
 
  Plt Count (130 - 400 /CUMM)  352
 
  MPV (7.4 - 10.4 FL)  7.5
 
  Gran % (42.2 - 75.2 %)  86.5  H
 
  Lymphocytes % (20.5 - 51.1 %)  10.5  L
 
  Monocytes % (1.7 - 9.3 %)  2.8
 
  Eosinophils % (0 - 5 %)  0
 
  Basophils % (0.0 - 2.0 %)  0.2
 
  Absolute Granulocytes (1.4 - 6.5 /CUMM)  13.0  H
 
  Segmented Neutrophils (42.2 - 75.2 %)  84  H
 
  Band Neutrophils (0.0 - 5.0 %)  5
 
  Absolute Lymphocytes (1.2 - 3.4 /CUMM)  1.6
 
  Lymphocytes (20.5 - 51.1 %)  9  L
 
  Monocytes (1.7 - 9.3 %)  2
 
  Absolute Monocytes (0.10 - 0.60 /CUMM)  0.4
 
  Absolute Eosinophils (0.0 - 0.7 /CUMM)  0
 
  Absolute Basophils (0.0 - 0.2 /CUMM)  0
 
  Platelet Estimate (ADEQUATE)  ADEQUATE
 
  Polychromasia  1+
 
  Anisocytosis  1+
 
  Target Cells  FEW
 
  Stomatocytes  FEW
 
  [place] Cells  RARE
 
  Elliptocytes  1+
 
Miscellaneous  
 
  Phlebotomy Draw Site RIGHT BRACHIAL 
 
Serology  
 
  Hep Bs Antigen (NONREACTIVE)  Pending
 
  Hep Bs Antibody (NONREACTIVE)  Pending
 
 
 
 
 03/20 03/20 03/20
 
 2200 1445 1115
 
Chemistry   
 
  Lactic Acid (0.7 - 2.1 mmol/L)  2.0 2.5  H
 
Coagulation   
 
  APTT (25 - 37 SEC) 64  H 39  H 
 
Hematology   
 
  CBC w Diff  MAN DIFF ORDERED 
 
  WBC (4.8 - 10.8 /CUMM)  14.2  H 
 
  RBC (4.20 - 5.40 /CUMM)  3.30  L 
 
  Hgb (12.0 - 16.0 G/DL)  8.6  L 
 
  Hct (37 - 47 %)  26.3  L 
 
  MCV (81.0 - 99.0 FL)  79.5  L 
 
  MCH (27.0 - 31.0 PG)  26.2  L 
 
  MCHC (33.0 - 37.0 G/DL)  33.0 
 
  RDW (11.5 - 14.5 %)  18.7  H 
 
  Plt Count (130 - 400 /CUMM)  338 
 
  MPV (7.4 - 10.4 FL)  7.1  L 
 
  Gran % (42.2 - 75.2 %)  85.9  H 
 
  Lymphocytes % (20.5 - 51.1 %)  10.7  L 
 
  Monocytes % (1.7 - 9.3 %)  3.2 
 
  Eosinophils % (0 - 5 %)  0 
 
  Basophils % (0.0 - 2.0 %)  0.2 
 
  Absolute Granulocytes (1.4 - 6.5 /CUMM)  12.2  H 
 
  Segmented Neutrophils (42.2 - 75.2 %)  82  H 
 
  Band Neutrophils (0.0 - 5.0 %)  7  H 
 
  Absolute Lymphocytes (1.2 - 3.4 /CUMM)  1.5 
 
  Lymphocytes (20.5 - 51.1 %)  8  L 
 
  Monocytes (1.7 - 9.3 %)  3 
 
  Absolute Monocytes (0.10 - 0.60 /CUMM)  0.4 
 
  Absolute Eosinophils (0.0 - 0.7 /CUMM)  0 
 
  Absolute Basophils (0.0 - 0.2 /CUMM)  0 
 
  Platelet Estimate (ADEQUATE)  ADEQUATE 
 
  Polychromasia     
 
  Hypochromic-Microcytic  1+ 
 
  Anisocytosis  1+ 
 
  Microcytic Cells  1+ 
 
 
 
Last 24 Hours of Sid Results:
Blood cultures March 19/March 20 negative
 
Sputum culture March 19 mixed eduardo
 
Urine culture March 19 negative
 
Recent Imaging Studies:
Chest x-ray March 21 no change in the chronic right upper lobe density, with 
lungs otherwise clear
 
 
Assessment/Plan ID
Impression:
Condition remains poor, unresponsive since a cardiac arrest on admission, off 
sedation.  She remains febrile, of unclear etiology, with a leukocytosis, which 
may be secondary to the steroids, which have been continued for interstitial 
nephritis diagnosed several months prior to admission.  She remains on Unasyn 
Day 3 of treatment for possible aspiration pneumonia, with overall improvement 
in her respiratory status, with her chest x-ray negative for any new 
consolidation and with her sputum culture only growing mixed eduardo.  She is 
tentatively scheduled for dialysis tomorrow.
 
Suggestion:
1.  Further evaluation of her neurologic status per Neurology
2.  Await dialysis, tentatively scheduled for the a.m.
3.  Remove right femoral triple lumen catheter
4.  Continue Unasyn

## 2018-03-21 NOTE — PN- CARDIOLOGY
Subjective
Subjective:
* Patient remains unresponsive with some spontaneous movement of her legs with 
breathing over the vent.
* sinus rhythm
* creatinine is 7.6
* troponin peaked at 4.2 and is trending down
* Increased WBC count with low grade fever and right lung infiltrate
* stable anemia
 
Objective
Vital Signs and I&Os
Vital Signs
 
 
Date Time Temp Pulse Resp B/P B/P Pulse O2 O2 Flow FiO2
 
     Mean Ox Delivery Rate 
 
03/21 1700         30
 
03/21 1428         30
 
03/21 1200      96 Ventilator 30% 
 
03/21 1138         30
 
03/21 0809         30
 
03/21 0800 98.2 107 20 146/64  99 Ventilator 40% 
 
03/21 0800      99 Ventilator 40% 
 
03/21 0644         40
 
03/21 0400      98 Ventilator 40% 
 
03/21 0301         40
 
03/21 0046         40
 
03/21 0000 100.4 106 20 144/68  98 Ventilator 40% 
 
03/21 0000      98 Ventilator 40% 
 
03/20 2233         40
 
03/20 2000      99 Ventilator 40% 
 
03/20 1930         40
 
 
 Intake & Output
 
 
 03/21 1600 03/21 0800 03/21 0000 03/20 1600 03/20 0800 03/20 0000
 
Intake Total 365 175  1455
 
Output Total     32 20
 
Balance 365 175  1435
 
       
 
Intake,  175  1455
 
Output, Urine     32 20
 
Patient 169 lb   169 lb  170 lb
 
Weight      
 
Weight      Bed scale
 
Measurement      
 
Method      
 
 
 
Physical Exam:
General: WD/overweight female. Intubated.
HEENT: NC/AT, pupils reactive to light, no dolls eyes
Neck: no JVD, no carotid bruit
Heart: RRR w/o murmur
Lungs: clear bilaterally
Abdomen: soft, NT, +ve bowel sounds
Extremities: no edema
Neuro: some spontaneous movement of legs bilaterally
 
Assessment/Plan
Assessment/Plan
* This patient has evidence of coma likely related to prolonged hypoglycemia. 
Changes on her head CT are suggestive of anoxic injury. 
* This patient has evidence of a NSTEMI with cardiac enzymes trending down. I 
suspect that her MI is a type 2 from supply demand mismatch rather than from an 
acute ruptured intracoronary plaque. As such, if bleeding is an issue her IV 
heparin can be stopped. He H/H is trending down. H/H is stable. Okay to stop IV 
heparin at this point in time.  I would not begin beta blockers at this point in
time due to her becoming bradycardic and going into asystole yesterday.
* Continue antibiotics for a likely aspiration pneumonia. 
Continue telemetry? Yes

## 2018-03-21 NOTE — PN- UROLOGY
Subjective
Subjective:
Remains intubated
 
Objective
Vital Signs and I&Os
Vital Signs
 
 
Date Time Temp Pulse Resp B/P B/P Pulse O2 O2 Flow FiO2
 
     Mean Ox Delivery Rate 
 
03/21 0644         40
 
03/21 0400      98 Ventilator 40% 
 
03/21 0301         40
 
03/21 0046         40
 
03/21 0000 100.4 106 20 144/68  98 Ventilator 40% 
 
03/21 0000      98 Ventilator 40% 
 
03/20 2233         40
 
03/20 2000      99 Ventilator 40% 
 
03/20 1930         40
 
03/20 1600 98.8 98 20 126/60  98 Ventilator 40% 
 
03/20 1600      98 Ventilator 40% 
 
03/20 1540         40
 
03/20 1412         40
 
03/20 1200      96 Ventilator 40% 
 
03/20 1149         40
 
03/20 0839         40
 
03/20 0800 100.8 92 30 118/60  98 Ventilator 65% 
 
03/20 0800      98 Ventilator 65% 
 
 
 Intake & Output
 
 
 03/21 0800 03/21 0000 03/20 1600 03/20 0800 03/20 0000 03/19 1600
 
Intake Total 175  1455 1250
 
Output Total    32 20 3
 
Balance 175  1435 1247
 
       
 
Intake,   1455 1250
 
Output, Urine    32 20 3
 
Patient   169 lb  170 lb 160 lb
 
Weight      
 
Weight     Bed scale Estimated
 
Measurement      
 
Method      
 
 
 
 
Abd:  soft and non tender.  Bladder not palpable
Genitalia:  pérez in place.  No drainage over last 24 hours
 
Patient was noted to void around pérez.  PVR by bladder scan is 214
 
 
 Laboratory Tests
 
 
 03/21 03/21
 
 0620 0400
 
Blood Gas  
 
  pH (7.35 - 7.45 PH) 7.43 
 
  pCO2 (35 - 45 TORR) 30  L 
 
  pO2 (80 - 100 TORR) 147  H 
 
  HCO3 (21 - 28 MEQ/L) 19  L 
 
  ABG O2 Sat (Measured) (>96.0 %) 98.0 
 
  P-50 (Temp Corrected) N 
 
  Carboxyhemoglobin (1.5 - 5.0 %) 0.3  L 
 
  O2 Concentration % .40 
 
  Respiration Rate (BPM) 20 
 
  O2 Delivery Method VENT 
 
  Vent Mode A/C 
 
  Expiratory Pressure (CMH2O/P) 5 
 
  Tidal Volume (CC) 500 
 
Chemistry  
 
  Sodium (137 - 145 mmol/L)  149  H
 
  Potassium (3.5 - 5.1 mmol/L)  4.1
 
  Chloride (98 - 107 mmol/L)  106
 
  Carbon Dioxide (22 - 30 mmol/L)  21  L
 
  Anion Gap (5 - 16)  22  H
 
  BUN (7 - 17 mg/dL)  102 *H
 
  Creatinine (0.5 - 1.0 mg/dL)  7.6 *H
 
  Estimated GFR (>60 ml/min)  5  L
 
  Glucose (65 - 99 mg/dL)  109  H
 
  Calcium (8.4 - 10.2 mg/dL)  7.8  L
 
  Phosphorus (2.5 - 4.5 mg/dL)  8.4  H
 
  Magnesium (1.6 - 2.3 mg/dL)  2.0
 
  Total Bilirubin (0.2 - 1.3 mg/dL)  0.7
 
  AST (14 - 36 U/L)  65  H
 
  ALT (9 - 52 U/L)  89  H
 
  Albumin (3.5 - 5.0 g/dL)  2.4  L
 
Hematology  
 
  CBC w Diff  MAN DIFF ORDERED
 
  WBC (4.8 - 10.8 /CUMM)  15.0  H
 
  RBC (4.20 - 5.40 /CUMM)  3.30  L
 
  Hgb (12.0 - 16.0 G/DL)  8.8  L
 
  Hct (37 - 47 %)  26.8  L
 
  MCV (81.0 - 99.0 FL)  81.3
 
  MCH (27.0 - 31.0 PG)  26.5  L
 
  MCHC (33.0 - 37.0 G/DL)  32.6  L
 
  RDW (11.5 - 14.5 %)  19.3  H
 
  Plt Count (130 - 400 /CUMM)  352
 
  MPV (7.4 - 10.4 FL)  7.5
 
  Gran % (42.2 - 75.2 %)  86.5  H
 
  Lymphocytes % (20.5 - 51.1 %)  10.5  L
 
  Monocytes % (1.7 - 9.3 %)  2.8
 
  Eosinophils % (0 - 5 %)  0
 
  Basophils % (0.0 - 2.0 %)  0.2
 
  Absolute Granulocytes (1.4 - 6.5 /CUMM)  13.0  H
 
  Segmented Neutrophils (42.2 - 75.2 %)  84  H
 
  Band Neutrophils (0.0 - 5.0 %)  5
 
  Absolute Lymphocytes (1.2 - 3.4 /CUMM)  1.6
 
  Lymphocytes (20.5 - 51.1 %)  9  L
 
  Monocytes (1.7 - 9.3 %)  2
 
  Absolute Monocytes (0.10 - 0.60 /CUMM)  0.4
 
  Absolute Eosinophils (0.0 - 0.7 /CUMM)  0
 
  Absolute Basophils (0.0 - 0.2 /CUMM)  0
 
  Platelet Estimate (ADEQUATE)  ADEQUATE
 
  Polychromasia  1+
 
  Anisocytosis  1+
 
  Target Cells  FEW
 
  Stomatocytes  FEW
 
  Lilliam Cells  RARE
 
  Elliptocytes  1+
 
Miscellaneous  
 
  Phlebotomy Draw Site RIGHT BRACHIAL 
 
Serology  
 
  Hep Bs Antigen (NONREACTIVE)  Pending
 
  Hep Bs Antibody (NONREACTIVE)  Pending
 
 
 
 
 03/20 03/20 03/20
 
 2200 1445 1115
 
Chemistry   
 
  Lactic Acid (0.7 - 2.1 mmol/L)  2.0 2.5  H
 
Coagulation   
 
  APTT (25 - 37 SEC) 64  H 39  H 
 
Hematology   
 
  CBC w Diff  MAN DIFF ORDERED 
 
  WBC (4.8 - 10.8 /CUMM)  14.2  H 
 
  RBC (4.20 - 5.40 /CUMM)  3.30  L 
 
  Hgb (12.0 - 16.0 G/DL)  8.6  L 
 
  Hct (37 - 47 %)  26.3  L 
 
  MCV (81.0 - 99.0 FL)  79.5  L 
 
  MCH (27.0 - 31.0 PG)  26.2  L 
 
  MCHC (33.0 - 37.0 G/DL)  33.0 
 
  RDW (11.5 - 14.5 %)  18.7  H 
 
  Plt Count (130 - 400 /CUMM)  338 
 
  MPV (7.4 - 10.4 FL)  7.1  L 
 
  Gran % (42.2 - 75.2 %)  85.9  H 
 
  Lymphocytes % (20.5 - 51.1 %)  10.7  L 
 
  Monocytes % (1.7 - 9.3 %)  3.2 
 
  Eosinophils % (0 - 5 %)  0 
 
  Basophils % (0.0 - 2.0 %)  0.2 
 
  Absolute Granulocytes (1.4 - 6.5 /CUMM)  12.2  H 
 
  Segmented Neutrophils (42.2 - 75.2 %)  82  H 
 
  Band Neutrophils (0.0 - 5.0 %)  7  H 
 
  Absolute Lymphocytes (1.2 - 3.4 /CUMM)  1.5 
 
  Lymphocytes (20.5 - 51.1 %)  8  L 
 
  Monocytes (1.7 - 9.3 %)  3 
 
  Absolute Monocytes (0.10 - 0.60 /CUMM)  0.4 
 
  Absolute Eosinophils (0.0 - 0.7 /CUMM)  0 
 
  Absolute Basophils (0.0 - 0.2 /CUMM)  0 
 
  Platelet Estimate (ADEQUATE)  ADEQUATE 
 
  Polychromasia     
 
  Hypochromic-Microcytic  1+ 
 
  Anisocytosis  1+ 
 
  Microcytic Cells  1+ 
 
 
 
 
 03/20 03/20
 
 0835 0835
 
Chemistry  
 
  Lactic Acid (0.7 - 2.1 mmol/L)  3.4  H
 
  Troponin I (< 0.11 ng/ml) 3.74 *H 
 
 
 
 
Assessment/Plan
Assessment/Plan
 
Imp:
     1.  Urethral stricture due to previous pelvic/urethral surgery
     2.  Acute kidney injury on CKD
     3.  Critically ill with questionable long term neurologic status
 
 
Plan:
     1.  Since pérez is not draining and she voided around pérez will remove 
pérez
     2.  Check pvr by bladder scan q shift
     3.  If patient absolutely needs bladder drainage then she would have to go 
to OR for attempted cystoscopyic placement and if that failed suprapubic tube 
insertion

## 2018-03-21 NOTE — PN- NEUROLOGY
Subjective
Subjective:
Admitted after being found unresponsive, duration unclear, with a blood glucose 
in the 20s.  She subsequently had an asystole arrest requiring approximately 15 
minutes of resuscitative efforts.
 
On steroids, which have been continued for interstitial nephritis diagnosed 
several months prior to admission.  She remains on Unasyn Day 3 of treatment for
possible aspiration pneumonia.
Acute on chronic kidney failure, to be dialyzed today.
 
Remains intubated, off sedatives
 
Objective
Vital Signs and I&Os
Vital Signs
 
 
Date Time Temp Pulse Resp B/P B/P Pulse O2 O2 Flow FiO2
 
     Mean Ox Delivery Rate 
 
03/21 0809         30
 
03/21 0800 98.2 107 20 146/64  99 Ventilator 40% 
 
03/21 0800      99 Ventilator 40% 
 
03/21 0644         40
 
03/21 0400      98 Ventilator 40% 
 
03/21 0301         40
 
03/21 0046         40
 
03/21 0000 100.4 106 20 144/68  98 Ventilator 40% 
 
03/21 0000      98 Ventilator 40% 
 
03/20 2233         40
 
03/20 2000      99 Ventilator 40% 
 
03/20 1930         40
 
03/20 1600 98.8 98 20 126/60  98 Ventilator 40% 
 
03/20 1600      98 Ventilator 40% 
 
03/20 1540         40
 
03/20 1412         40
 
03/20 1200      96 Ventilator 40% 
 
03/20 1149         40
 
 
 Intake & Output
 
 
 03/21 1600 03/21 0800 03/21 0000 03/20 1600 03/20 0800 03/20 0000
 
Intake Total  175  1455
 
Output Total     32 20
 
Balance  175  1435
 
       
 
Intake, IV  175  1455
 
Output, Urine     32 20
 
Patient    169 lb  170 lb
 
Weight      
 
Weight      Bed scale
 
Measurement      
 
Method      
 
 
 
Physical Exam:
Intubated, eyes closed
No response to verbal stimuli
Follows no commands
To sternal rub, there is decerebrate posturing and grimacing
With nailbed pressure to the hands, there is decerebrate posturing
With nailbed pressure to the toes, there is flexion withdrawal, reflexive versus
semi-purposeful
Gag reflex present
Corneal reflexes present
Oculocephalics intact
Pupils equal and round, sluggishly reactive to light
 
Current Medications:
 Current Medications
 
 
  Sig/Susan Start time  Last
 
Medication Dose Route Stop Time Status Admin
 
Acetaminophen 1,000 MG Q6P PRN 03/19 1445 AC 03/20
 
  IV   0551
 
Ampicillin Sodium/ 3,000 MG Q12 03/19 1730 AC 03/20
 
Sulbactam Sodium  IV   2146
 
Sodium Chloride 100 ML    
 
Aspirin 81 MG DAILY 03/21 1000 AC 
 
  PO   
 
Atorvastatin Calcium 40 MG 1700 03/21 1700 AC 
 
  PO   
 
Heparin Sodium  5,000 UNIT .STK-MED ONE 03/20 1528 DC 
 
(Porcine)  IV 03/20 1529  
 
Heparin Sodium  25,000 UNIT Q24H 03/19 1730 AC 03/20
 
(Porcine)  IV   2241
 
Sodium Chloride 500 ML    
 
Hydrocortisone  25 MG BID 03/20 1000 AC 03/20
 
Sodium Succinate  IV   2147
 
Insulin Aspart 0 Q4 03/19 2200 AC 03/21
 
  SC   0217
 
Insulin Detemir 14 UNITS BID 03/21 1000 AC 
 
  SC   
 
Insulin Detemir 16 UNITS BID 03/20 1000 DC 03/20
 
  SC   2146
 
Levothyroxine Sodium 25 MCG DAILY 03/20 1000 AC 03/20
 
  IV   1022
 
Lorazepam 1 MG Q4P PRN 03/20 1400 AC 
 
  IV   
 
Magnesium Citrate 300 ML ONE ONE 03/20 1615 CAN 
 
  PO 03/20 1616  
 
Magnesium Sulfate 1 GM ONCE ONE 03/20 1645 DC 03/20
 
Dextrose/Water 100 ML IV 03/20 2044  1720
 
Norepinephrine 8 MG Q24H 03/20 0600 DC 
 
Sodium Chloride 250 ML IV   
 
Pantoprazole Sodium 40 MG DAILY 03/19 1730 AC 03/20
 
  IV   1018
 
Propofol 1,000 MG CONTINOUS INFUSION 03/20 0030 DC 03/20
 
N/A 1 UNIT IV   0030
 
Scopolamine HBr 1 PAT Q72H PRN 03/20 1600 AC 03/20
 
  TOP   1640
 
Sodium Bicarbonate 150 MEQ Q10H 03/20 0300 DC 03/20
 
Dextrose/Water 1,000 ML IV   0325
 
 
 
 
Results
Last 24 Hours of Lab Results:
 Laboratory Tests
 
 
 03/21 03/21
 
 0620 0400
 
Blood Gas  
 
  pH (7.35 - 7.45 PH) 7.43 
 
  pCO2 (35 - 45 TORR) 30  L 
 
  pO2 (80 - 100 TORR) 147  H 
 
  HCO3 (21 - 28 MEQ/L) 19  L 
 
  ABG O2 Sat (Measured) (>96.0 %) 98.0 
 
  P-50 (Temp Corrected) N 
 
  Carboxyhemoglobin (1.5 - 5.0 %) 0.3  L 
 
  O2 Concentration % .40 
 
  Respiration Rate (BPM) 20 
 
  O2 Delivery Method VENT 
 
  Vent Mode A/C 
 
  Expiratory Pressure (CMH2O/P) 5 
 
  Tidal Volume (CC) 500 
 
Chemistry  
 
  Sodium (137 - 145 mmol/L)  149  H
 
  Potassium (3.5 - 5.1 mmol/L)  4.1
 
  Chloride (98 - 107 mmol/L)  106
 
  Carbon Dioxide (22 - 30 mmol/L)  21  L
 
  Anion Gap (5 - 16)  22  H
 
  BUN (7 - 17 mg/dL)  102 *H
 
  Creatinine (0.5 - 1.0 mg/dL)  7.6 *H
 
  Estimated GFR (>60 ml/min)  5  L
 
  Glucose (65 - 99 mg/dL)  109  H
 
  Calcium (8.4 - 10.2 mg/dL)  7.8  L
 
  Phosphorus (2.5 - 4.5 mg/dL)  8.4  H
 
  Magnesium (1.6 - 2.3 mg/dL)  2.0
 
  Total Bilirubin (0.2 - 1.3 mg/dL)  0.7
 
  AST (14 - 36 U/L)  65  H
 
  ALT (9 - 52 U/L)  89  H
 
  Albumin (3.5 - 5.0 g/dL)  2.4  L
 
Hematology  
 
  CBC w Diff  MAN DIFF ORDERED
 
  WBC (4.8 - 10.8 /CUMM)  15.0  H
 
  RBC (4.20 - 5.40 /CUMM)  3.30  L
 
  Hgb (12.0 - 16.0 G/DL)  8.8  L
 
  Hct (37 - 47 %)  26.8  L
 
  MCV (81.0 - 99.0 FL)  81.3
 
  MCH (27.0 - 31.0 PG)  26.5  L
 
  MCHC (33.0 - 37.0 G/DL)  32.6  L
 
  RDW (11.5 - 14.5 %)  19.3  H
 
  Plt Count (130 - 400 /CUMM)  352
 
  MPV (7.4 - 10.4 FL)  7.5
 
  Gran % (42.2 - 75.2 %)  86.5  H
 
  Lymphocytes % (20.5 - 51.1 %)  10.5  L
 
  Monocytes % (1.7 - 9.3 %)  2.8
 
  Eosinophils % (0 - 5 %)  0
 
  Basophils % (0.0 - 2.0 %)  0.2
 
  Absolute Granulocytes (1.4 - 6.5 /CUMM)  13.0  H
 
  Segmented Neutrophils (42.2 - 75.2 %)  84  H
 
  Band Neutrophils (0.0 - 5.0 %)  5
 
  Absolute Lymphocytes (1.2 - 3.4 /CUMM)  1.6
 
  Lymphocytes (20.5 - 51.1 %)  9  L
 
  Monocytes (1.7 - 9.3 %)  2
 
  Absolute Monocytes (0.10 - 0.60 /CUMM)  0.4
 
  Absolute Eosinophils (0.0 - 0.7 /CUMM)  0
 
  Absolute Basophils (0.0 - 0.2 /CUMM)  0
 
  Platelet Estimate (ADEQUATE)  ADEQUATE
 
  Polychromasia  1+
 
  Anisocytosis  1+
 
  Target Cells  FEW
 
  Stomatocytes  FEW
 
  Lilliam Cells  RARE
 
  Elliptocytes  1+
 
Miscellaneous  
 
  Phlebotomy Draw Site RIGHT BRACHIAL 
 
Serology  
 
  Hep Bs Antigen (NONREACTIVE)  Pending
 
  Hep Bs Antibody (NONREACTIVE)  Pending
 
 
 
 
 03/20 03/20 03/20
 
 2200 1445 1115
 
Chemistry   
 
  Lactic Acid (0.7 - 2.1 mmol/L)  2.0 2.5  H
 
Coagulation   
 
  APTT (25 - 37 SEC) 64  H 39  H 
 
Hematology   
 
  CBC w Diff  MAN DIFF ORDERED 
 
  WBC (4.8 - 10.8 /CUMM)  14.2  H 
 
  RBC (4.20 - 5.40 /CUMM)  3.30  L 
 
  Hgb (12.0 - 16.0 G/DL)  8.6  L 
 
  Hct (37 - 47 %)  26.3  L 
 
  MCV (81.0 - 99.0 FL)  79.5  L 
 
  MCH (27.0 - 31.0 PG)  26.2  L 
 
  MCHC (33.0 - 37.0 G/DL)  33.0 
 
  RDW (11.5 - 14.5 %)  18.7  H 
 
  Plt Count (130 - 400 /CUMM)  338 
 
  MPV (7.4 - 10.4 FL)  7.1  L 
 
  Gran % (42.2 - 75.2 %)  85.9  H 
 
  Lymphocytes % (20.5 - 51.1 %)  10.7  L 
 
  Monocytes % (1.7 - 9.3 %)  3.2 
 
  Eosinophils % (0 - 5 %)  0 
 
  Basophils % (0.0 - 2.0 %)  0.2 
 
  Absolute Granulocytes (1.4 - 6.5 /CUMM)  12.2  H 
 
  Segmented Neutrophils (42.2 - 75.2 %)  82  H 
 
  Band Neutrophils (0.0 - 5.0 %)  7  H 
 
  Absolute Lymphocytes (1.2 - 3.4 /CUMM)  1.5 
 
  Lymphocytes (20.5 - 51.1 %)  8  L 
 
  Monocytes (1.7 - 9.3 %)  3 
 
  Absolute Monocytes (0.10 - 0.60 /CUMM)  0.4 
 
  Absolute Eosinophils (0.0 - 0.7 /CUMM)  0 
 
  Absolute Basophils (0.0 - 0.2 /CUMM)  0 
 
  Platelet Estimate (ADEQUATE)  ADEQUATE 
 
  Polychromasia     
 
  Hypochromic-Microcytic  1+ 
 
  Anisocytosis  1+ 
 
  Microcytic Cells  1+ 
 
 
 
Recent Imaging Studies:
Head CT today 
FINDINGS:
There is relatively extensive loss of gray-white matter differentiation
primarily involving the basal ganglia, right insular cortex, and both
temporal lobes. There is some preservation of gray-white matter
differentiation for instance within the interhemispheric fissure near the
vertex, both frontal lobes, and left insular cortex. Cytotoxic edema causes
regional sulcal effacement. No midline shift or hydrocephalus. There is no
acute intracranial hemorrhage and no abnormal extra-axial collection. The
calvarium and skull base are intact. There is a trace right mastoid tip
effusion. Moderate to severe paranasal sinus disease.
 
IMPRESSION:
There is relatively extensive loss of gray-white matter differentiation
primarily involving the basal ganglia, right insular cortex, and both
temporal lobes. These findings are consistent with hypoxic ischemic injury.
No acute hemorrhage.
 
 
DICTATED BY: Juan Anaya MD 
DATE/TIME DICTATED:03/21/18 / 1125
 
 
 
IMPRESSION:
Nondiagnostic CT of the head secondary to significant streak/motion artifact
despite repeating the acquisition. I cannot exclude infarcts nor hemorrhage
on this study.
 
 
 
DICTATED BY: Norberto Loco MD 
DATE/TIME DICTATED:03/19/18 / 1252
 
Assessment/Plan
Assessment:
Severe encephalopathy, multifactorial (anoxic and metabolic)
Neuro exam slightly improved compared to previous,
Though prognosis is guarded to poor at this time
Plan:
Discussed with the family and the ICU team
Supportive care to be continued for now
Continue neuro checks and keep the patient off of sedating medications

## 2018-03-21 NOTE — PN- RESIDENT CRCU
Rayo BARLOW,Tono 18 0621:
Subjective
HPI/CRCU Issues:
Overnight issues:
Patient remains intubated and unresponsive today off sedation.  No acute events 
overnight.
 
Vitals:
MAXIMUM TEMPERATURE of 100.8, heart rate: 98 to 114 sinus tach, respiration rate
20-30, blood pressure: Systolic 107 to 158 over diastolic 55-73, saturating 
between 96-99% on mechanical ventilation with tidal volume of 500, FiO2 
decreased from 65% to 40%, PEEP of 5
 
Total intake 3089, total output 52 with bladder scan showing PVR of 190, 110, 70
 
On IV heparin
 
Labs:
WBC 16 with 86.5% granulocytes, 84 segmented neutrophils, 5 bands, H&H 8.8 and 
26.8, platelet 352
 
Sodium 149, potassium 4.1, chloride 106, bicarbonate 21, anion gap 22, , 
creatinine 7.6, glucose 109, calcium 7.8, albumin 2.4, phosphorus 8.4, magnesium
2.0, T bili 0.7, AST 65, ALT 89
 
Chest x-ray: Endotracheal tube 4 cm above the kaushal, and has a gastric tube in 
the stomach, patchy infiltrate at the right upper lobe
 
 
 
Objective
 
Vital Signs & I&O
Last 8 Hrs of Vitals and I&O:
 Vital Signs
 
 
Date Time Temp Pulse Resp B/P B/P Pulse O2 O2 Flow FiO2
 
     Mean Ox Delivery Rate 
 
 1428         30
 
 1200      96 Ventilator 30% 
 
 1138         30
 
 0809         30
 
 0800 98.2 107 20 146/64  99 Ventilator 40% 
 
 0800      99 Ventilator 40% 
 
 0644         40
 
 0400      98 Ventilator 40% 
 
 0301         40
 
 0046         40
 
 0000 100.4 106 20 144/68  98 Ventilator 40% 
 
 0000      98 Ventilator 40% 
 
 2233         40
 
 2000      99 Ventilator 40% 
 
 1930         40
 
 
 Intake & Output
 
 
  1600  0800  0000
 
Intake Total 365 175 400
 
Output Total   
 
Balance 365 175 400
 
    
 
Intake,  175 400
 
Patient 169 lb  
 
Weight   
 
 
 Intake & Output
 
 
  1600
 
Intake Total 365
 
Output Total 
 
Balance 365
 
  
 
Intake, 
 
Patient 169 lb
 
Weight 
 
 
 
 
Exam
General Appearance: well developed/nourished, intubated, unresponsive
Head: atraumatic
Respiratory: lungs clear (to anterior auscultation )
Cardiovascular: regular rate/rhythm
Gastrointestinal: normal bowel sounds, soft, non-tender
Extremities: normal inspection
Cranial Nerves: patient unresponsive, PERRL
Other Physical Findings:
+babinksi, spontaneous leg movement, no response to tacticle, verbal or painful 
stimuli
 
IV Drips
IV Drips:
IV heparin 
Current Medications:
 Current Medications
 
 
  Sig/Susan Start time  Last
 
Medication Dose Route Stop Time Status Admin
 
Acetaminophen 1,000 MG Q6P PRN  1445 AC 
 
  IV   0551
 
Ampicillin Sodium/ 3,000 MG Q12  1730 AC 
 
Sulbactam Sodium  IV   1037
 
Sodium Chloride 100 ML    
 
Aspirin 81 MG DAILY  1000 AC 
 
  PO   1317
 
Atorvastatin Calcium 40 MG 1700  1700 AC 
 
  PO   
 
Heparin Sodium  25,000 UNIT Q24H  1730 AC 
 
(Porcine)  IV   2241
 
Sodium Chloride 500 ML    
 
Hydrocortisone  25 MG BID  1000 AC 
 
Sodium Succinate  IV   1026
 
Insulin Aspart 0 Q4  2200 AC 
 
  SC   0217
 
Insulin Detemir 12 UNITS BID  2200 AC 
 
  SC   
 
Insulin Detemir 12 UNITS ONCE ONE  1445 DC 
 
  SC  1446  1447
 
Insulin Detemir 14 UNITS BID  1000 DC 
 
  SC   
 
Insulin Detemir 16 UNITS BID  1000 DC 
 
  SC   2146
 
Levothyroxine Sodium 25 MCG DAILY  1000 AC 
 
  IV   1026
 
Lorazepam 1 MG Q4P PRN  1400 AC 
 
  IV   
 
Magnesium Citrate 300 ML ONE ONE  1615 CAN 
 
  PO  1616  
 
Magnesium Sulfate 1 GM ONCE ONE  1645 DC 
 
Dextrose/Water 100 ML IV  2044  1720
 
Pantoprazole Sodium 40 MG DAILY  1730 AC 
 
  IV   1026
 
Scopolamine HBr 1 PAT Q72H PRN  1600 AC 
 
  TOP   1640
 
 
 
 
Results
Results:
 Laboratory Tests
 
 
 
 
 1005 0620
 
Blood Gas  
 
  pH (7.35 - 7.45 PH)  7.43
 
  pCO2 (35 - 45 TORR)  30  L
 
  pO2 (80 - 100 TORR)  147  H
 
  HCO3 (21 - 28 MEQ/L)  19  L
 
  ABG O2 Sat (Measured) (>96.0 %)  98.0
 
  P-50 (Temp Corrected)  N
 
  Carboxyhemoglobin (1.5 - 5.0 %)  0.3  L
 
  O2 Concentration %  .40
 
  Respiration Rate (BPM)  20
 
  O2 Delivery Method  VENT
 
  Vent Mode  A/C
 
  Expiratory Pressure (CMH2O/P)  5
 
  Tidal Volume (CC)  500
 
Coagulation  
 
  APTT (25 - 37 SEC) 66  H 
 
Miscellaneous  
 
  Phlebotomy Draw Site  RIGHT BRACHIAL
 
 
 
 
 
 
 0400 2200
 
Chemistry  
 
  Sodium (137 - 145 mmol/L) 149  H 
 
  Potassium (3.5 - 5.1 mmol/L) 4.1 
 
  Chloride (98 - 107 mmol/L) 106 
 
  Carbon Dioxide (22 - 30 mmol/L) 21  L 
 
  Anion Gap (5 - 16) 22  H 
 
  BUN (7 - 17 mg/dL) 102 *H 
 
  Creatinine (0.5 - 1.0 mg/dL) 7.6 *H 
 
  Estimated GFR (>60 ml/min) 5  L 
 
  Glucose (65 - 99 mg/dL) 109  H 
 
  Calcium (8.4 - 10.2 mg/dL) 7.8  L 
 
  Phosphorus (2.5 - 4.5 mg/dL) 8.4  H 
 
  Magnesium (1.6 - 2.3 mg/dL) 2.0 
 
  Total Bilirubin (0.2 - 1.3 mg/dL) 0.7 
 
  AST (14 - 36 U/L) 65  H 
 
  ALT (9 - 52 U/L) 89  H 
 
  Albumin (3.5 - 5.0 g/dL) 2.4  L 
 
Coagulation  
 
  APTT (25 - 37 SEC)  64  H
 
Hematology  
 
  CBC w Diff MAN DIFF ORDERED 
 
  WBC (4.8 - 10.8 /CUMM) 15.0  H 
 
  RBC (4.20 - 5.40 /CUMM) 3.30  L 
 
  Hgb (12.0 - 16.0 G/DL) 8.8  L 
 
  Hct (37 - 47 %) 26.8  L 
 
  MCV (81.0 - 99.0 FL) 81.3 
 
  MCH (27.0 - 31.0 PG) 26.5  L 
 
  MCHC (33.0 - 37.0 G/DL) 32.6  L 
 
  RDW (11.5 - 14.5 %) 19.3  H 
 
  Plt Count (130 - 400 /CUMM) 352 
 
  MPV (7.4 - 10.4 FL) 7.5 
 
  Gran % (42.2 - 75.2 %) 86.5  H 
 
  Lymphocytes % (20.5 - 51.1 %) 10.5  L 
 
  Monocytes % (1.7 - 9.3 %) 2.8 
 
  Eosinophils % (0 - 5 %) 0 
 
  Basophils % (0.0 - 2.0 %) 0.2 
 
  Absolute Granulocytes (1.4 - 6.5 /CUMM) 13.0  H 
 
  Segmented Neutrophils (42.2 - 75.2 %) 84  H 
 
  Band Neutrophils (0.0 - 5.0 %) 5 
 
  Absolute Lymphocytes (1.2 - 3.4 /CUMM) 1.6 
 
  Lymphocytes (20.5 - 51.1 %) 9  L 
 
  Monocytes (1.7 - 9.3 %) 2 
 
  Absolute Monocytes (0.10 - 0.60 /CUMM) 0.4 
 
  Absolute Eosinophils (0.0 - 0.7 /CUMM) 0 
 
  Absolute Basophils (0.0 - 0.2 /CUMM) 0 
 
  Platelet Estimate (ADEQUATE) ADEQUATE 
 
  Polychromasia 1+ 
 
  Anisocytosis 1+ 
 
  Target Cells FEW 
 
  Stomatocytes FEW 
 
  Lilliam Cells RARE 
 
  Elliptocytes 1+ 
 
Serology  
 
  Hep Bs Antigen (NONREACTIVE) NONREACTIVE 
 
  Hep Bs Antibody (NONREACTIVE) NONREACTIVE 
 
 
 
 
Impression/Plan
 
Impression/Problem List
Impression:
This is 58-year-old female with a medical history of stage 5 CKD(nephrotic 
proteinuria, diabetic nephropathy and retinopathy) with left AV fistula placed 
couple month ago(still making urine), currently not on hemodialysis, has only 
one kidney since childhood.  Chronic anemia, hypertension, type 1 diabetes on 
insulin pump, hypothyroidism, ankylosing spondylitis on chronic prednisone, 
depression, GERD, right lung mass according to the family the biopsy came back 
as a benign, hyperparathyroidism due to renal insufficiency, chronic pain.  
Presented to the emergency department via EMS due to unresponsiveness.
 
 
Patient is admitted to the ICU for management of the following: 
 
Respiratory: 
 
Acute hypoxic respiratory failure 2/2 to aspiration pneumonia on mechanical 
ventilation: 
Patient continues to remain on mechanical ventilation. Oxygen requirement 
continues to improve with decreased FiO2. Patient's remains unresponsive off 
sedation. 
- continue mechanical ventilation
- ativan 1mg q4h PRN for agitation 
- Repeat ABG and chest x-ray in a.m.
 
Infection: 
Aspiration Pneumonia: 
Patient continues to be tachycardic, febrile with elevated leukocytosis which is
downtrending on IV Unasyn.  No growth on cultures thus far.  Repeat chest xray 
shows persistent bilateral airspace opacities with largest area of consolidation
in the right upper lobe. Patient remains on ventilatory support. CT Chest 
showing lobulated mass in the right upper lobe medially is not significantly 
changed in size. There are now patchy areas of ground glass opacification 
nodularity in both lungs, which may be consistent with multifocal pneumonic 
infiltrates.
 
- continue IV Unasyn 
- continue to monitor CBCs, vitals
- Follow-up blood cultures, sputum culture
- ID on board.  Appreciate recommendations.
 
Cardiac:
Status post cardiac arrest in the ED
New right bundle branch block, wide QRS tachycardia
NSTEMI- likely 2/2 to demand ischemia
Patient's troponin's peaked to 4.20. Patient remains unresponsive on mechanical 
ventilator. Patient's heparin was temporarily stopped on 3/20 due to concerns of
bleeding from pérez site. Patient was seen by cardiology. H/H was stable and 
patient's heparin was shortly after on 3/20.
- continue IV heparin 
- continue aspirin and statin
 
Heme: 
 
Anemia
Patient has a history of chronic anemia. Patient had bleeding in the pérez and 
around the pérez site. H/H was monitored with no acute drop. 
- continue to monitor H/H 
 
Metabolic: 
 
Type 1 Diabetes - on Insulin Pump
Patient initially presented with hypoglycemia w/ BG of 27. Patients BG over the 
past 24 hours was in the high 200s to high 300s. 
- Endocrinology on board. Appreciate recommendations 
- Decreased levemir to 12 units BID 
- Insulin SS adjusted per endocrinology recommendations 
- Target BG per endocrinology: FSG of 100 to 200. 
 
Adrenal Insufficiency. Patient is on chronic prednisone for interstitital 
nephritis.
- Stress dose steroids with IV hydrocortisone 25mg IV BID 
 
Metabolic acidosis with elevated anion gap and lactic acidosis - Resolved
Patient's anion gap is down from previous day. Lactic acid was previously 
fluctuating however is now downtrending. Patient was previously getting IV 
Bicarb. 
 
Alimentary:
 
Patient is currently NPO due to intubation.
Nutrition consulted today. Patient started on Nepro Tube Feeds. 
Fluids are being given cautiously due to ECHO showing LVEF of 30%. 
 
Neurology:
 
Unresponsiveness - likely multifactorial at this point. Initially secondary to 
severe hypoglycemia. Patient likely had a seizure prior to arrival as she was 
found to have urinary and bladder incontinence and had an elevated prolactin 
level on admission. Patient was also found to have vomitted and aspirated. In 
the ED on day of admision patient had a cardiac arrest for approximately 10 
minutes. Patient had a head CT on admission which was nondiagnostic due to 
motion artifact. Repeat CT on 3/21 showed extensive loss of gray-white matter 
differentiation primarily involving the basal ganglia, right insular cortex, and
both temporal lobes. These findings are consistent with hypoxic ischemic injury.
No acute hemorrhage.
- neurology is on board. appreciate recommendations 
- continue neurochecks
- continue to watch off sedation 
- continue aspiration protocol 
- continue aspirin 
- continue to monitor and replete electrolytes 
 
Nephrology:
 
ESRD w/left sided AV fistula: 
Patient has a history of ESRD with biopsy showing chronic intersitial nephritis 
and diabetic nephropathy. Patient was placed on a course of steroids which were 
tapered off. Patient has been evaluated by nephrology today. IV Bicarb was 
discontinued. 
- patient is to go for dialysis tomorrow. Spoke to Dr. Denis, patient's fistula 
will be used. 
- continue to monitor electrolytes 
- continue to monitor I/O 
- nephrology consulted. appreciate recommendations 
 
 
Urology: 
Urethral stricture. Nursing was unable to placed pérez. Urology was consulted 
and placed a 16 [place] pérez after urethral dilation. Patient's pérez appears to
be obstructed today with what appears to be clotted frothy blood in the bag and 
area around the pérez with bleeding and showing leakage. Irrigation on 3/20 did 
not improve the patency of the pérez. The pérez was removed this morning by 
urology. This afternoon patient's PVR >500. Dr. Garvin was contacted. 
- Pérez placement per urology 
- PVR qshift 
 
 
DVT PPx: 
On IV Heparin 
 
Code: 
Full code 
Problem List:
 1. Hypoxic brain injury
 
 2. NSTEMI (non-ST elevated myocardial infarction)
 
 3. End stage renal disease
 
Pain Ratin
Tomorrow's Labs & Rationales:
cbc 
bep 
abg 
 
 
Plan
DVT/Prophylaxis: mechanical, pharmacological
 
 
Mike Haddad MD 18 1043:
Attending MD Review Statement
 
Attending Sign Off
Attending Cosign Statement:
I have: examined this patient, reviewed avalbl EMR data, personally reviewd 
images, discussd w/resident/PA/NP, discussed mgmt plan w/josé, discussed mgmt 
plan w/CM, discussed mgmt plan w/pt, agreed w/resident/PA/NP, amended to note. 
Other Findings:
Impression
58 year old woman
 
 s/p asystolic arrest, likely/presumed secondary to insulin overdose resulting 
in hypolglycemia
 CKD
 unresponsiveness
 hypoxemic respiratory failure
 lung nodule s/p non diagnostic biopsy
 leukocytosis with likely aspiration
 
Plan
Respiratory
-mechanical ventilation to be continued
-lung nodule was non-diagnositic, ct chest without contrast today
 
ID
-f/u ID, unsayn for now
 
[facility]
-monitor hemodynamics
-f/u cardiology recs
 
Heme
-f/u cbc, coags
 
Metabolic
-f/u endocrine/renal recs
-monitor ins/outs
-monitor electrolytes
-plan for HD per nephrology
 
Alimentary
-begin nepro tube feeds
-finger sticks
 
Neuro
-neurology appreciated
-ct head today
 
DVT prophylaxis at all times
 
TTS 40 min
 
Family at bedside, discussed system based problem list and treatment plan

## 2018-03-21 NOTE — PN- NEPHROLOGY
Assessment/Plan Nephrology
Assessment:
BIANCA on CKD 5.  Pt would need dialysis soon in any case but given oligoanuria and
intubation we should start dialysis soon. I discussed this with her  and 
son who are with her today.  I will begin dialysis tomorrow and plan dialysis 
Thursday/Friday/Saturday this week.   Please check with the lab and make sure 
hepatitis B S Ag has been sent and run and please place a hard copy in her chart
of this result for the dialysis nurse.  
Thanks
Juan Denis MD
Suggestion:
.
 
 
Subjective
Subjective:
Pt remains intubated.  Remains oligoanuric. 
 
Objective
Vital Signs and I&Os
\
F intubated
100.4  106  144/68
Lungs clear
Cor RRR
Abd soft
Ext neg edema AVF pos thrill
 
 
 
Results
Pertinent Lab Results:
 
7.43/30/147
149 / 106 / 102 /
4.1 / 22 / 7.6 ]

## 2018-03-21 NOTE — PN- UROLOGY
Surgical Brief Attending Note
Brief Attending Note:
Patient voided spontaneously earlier today.  Hasn't voided in several hours.  
Bladder scan;  400-500 cc.  Attempted to place pérez at bedside without success
 
Plan:
     1.  May need to go to OR for attempted cystoscopic placement of pérez and 
if not sucessful suprapubic tube insertion.  This would have to be done in an 
open fashion with an abdominal incision due to her previous pelvic surgery.
 
     2.  Will discuss with Dr Haddad.  Patient is currently on a heparin drip 
which would have to be stopped

## 2018-03-21 NOTE — RADIOLOGY REPORT
EXAMINATION:
XR PORTABLE CHEST
 
CLINICAL INFORMATION:
Intubation.
 
COMPARISON:
Chest x-ray March 20, 2018
 
TECHNIQUE:
Portable frontal view of the chest was obtained. 6:02 AM
 
FINDINGS:
Endotracheal tube about 4 cm above the kaushal.
Nasogastric tube in stomach.
Lung apices not included in exam. There is a patchy density in the right
upper lobe. The left lung is now clear. No pleural effusion. There is no
significant pulmonary vascular congestion.
 
IMPRESSION:
 
1. Endotracheal tube about 47 is above kaushal.
2. Nasogastric tube in stomach.
3. Patchy infiltrate right upper lobe. Left lung is now clear.

## 2018-03-22 VITALS — DIASTOLIC BLOOD PRESSURE: 85 MMHG | SYSTOLIC BLOOD PRESSURE: 164 MMHG

## 2018-03-22 VITALS — SYSTOLIC BLOOD PRESSURE: 167 MMHG | DIASTOLIC BLOOD PRESSURE: 70 MMHG

## 2018-03-22 VITALS — SYSTOLIC BLOOD PRESSURE: 147 MMHG | DIASTOLIC BLOOD PRESSURE: 63 MMHG

## 2018-03-22 LAB
ABSOLUTE GRANULOCYTE CT: 11.1 /CUMM (ref 1.4–6.5)
ABSOLUTE GRANULOCYTE CT: 8.7 /CUMM (ref 1.4–6.5)
ABSOLUTE GRANULOCYTE CT: 9.7 /CUMM (ref 1.4–6.5)
BASOPHILS # BLD: 0 /CUMM (ref 0–0.2)
BASOPHILS NFR BLD: 0.1 % (ref 0–2)
BASOPHILS NFR BLD: 0.2 % (ref 0–2)
BASOPHILS NFR BLD: 0.2 % (ref 0–2)
EOSINOPHIL # BLD: 0 /CUMM (ref 0–0.7)
EOSINOPHIL NFR BLD: 0 % (ref 0–5)
EOSINOPHIL NFR BLD: 0 % (ref 0–5)
EOSINOPHIL NFR BLD: 0.1 % (ref 0–5)
ERYTHROCYTE [DISTWIDTH] IN BLOOD BY AUTOMATED COUNT: 17.6 % (ref 11.5–14.5)
ERYTHROCYTE [DISTWIDTH] IN BLOOD BY AUTOMATED COUNT: 18.5 % (ref 11.5–14.5)
ERYTHROCYTE [DISTWIDTH] IN BLOOD BY AUTOMATED COUNT: 18.5 % (ref 11.5–14.5)
GRANULOCYTES NFR BLD: 77.2 % (ref 42.2–75.2)
GRANULOCYTES NFR BLD: 82.2 % (ref 42.2–75.2)
GRANULOCYTES NFR BLD: 85.3 % (ref 42.2–75.2)
HCT VFR BLD CALC: 22.2 % (ref 37–47)
HCT VFR BLD CALC: 23.1 % (ref 37–47)
HCT VFR BLD CALC: 28.6 % (ref 37–47)
LYMPHOCYTES # BLD: 1.4 /CUMM (ref 1.2–3.4)
LYMPHOCYTES # BLD: 1.7 /CUMM (ref 1.2–3.4)
LYMPHOCYTES # BLD: 2 /CUMM (ref 1.2–3.4)
MCH RBC QN AUTO: 26.2 PG (ref 27–31)
MCH RBC QN AUTO: 26.6 PG (ref 27–31)
MCH RBC QN AUTO: 27.1 PG (ref 27–31)
MCHC RBC AUTO-ENTMCNC: 32.1 G/DL (ref 33–37)
MCHC RBC AUTO-ENTMCNC: 32.2 G/DL (ref 33–37)
MCHC RBC AUTO-ENTMCNC: 33 G/DL (ref 33–37)
MCV RBC AUTO: 80.7 FL (ref 81–99)
MCV RBC AUTO: 81.6 FL (ref 81–99)
MCV RBC AUTO: 84.2 FL (ref 81–99)
MONOCYTES # BLD: 0.4 /CUMM (ref 0.1–0.6)
MONOCYTES # BLD: 0.5 /CUMM (ref 0.1–0.6)
MONOCYTES # BLD: 0.6 /CUMM (ref 0.1–0.6)
PLATELET # BLD: 280 /CUMM (ref 130–400)
PLATELET # BLD: 281 /CUMM (ref 130–400)
PLATELET # BLD: 310 /CUMM (ref 130–400)
PMV BLD AUTO: 7.3 FL (ref 7.4–10.4)
PMV BLD AUTO: 7.6 FL (ref 7.4–10.4)
PMV BLD AUTO: 7.6 FL (ref 7.4–10.4)
RED BLOOD CELL CT: 2.72 /CUMM (ref 4.2–5.4)
RED BLOOD CELL CT: 2.86 /CUMM (ref 4.2–5.4)
RED BLOOD CELL CT: 3.39 /CUMM (ref 4.2–5.4)
WBC # BLD AUTO: 11.3 /CUMM (ref 4.8–10.8)
WBC # BLD AUTO: 11.8 /CUMM (ref 4.8–10.8)
WBC # BLD AUTO: 13.1 /CUMM (ref 4.8–10.8)

## 2018-03-22 PROCEDURE — 0T9B80Z DRAINAGE OF BLADDER WITH DRAINAGE DEVICE, VIA NATURAL OR ARTIFICIAL OPENING ENDOSCOPIC: ICD-10-PCS | Performed by: UROLOGY

## 2018-03-22 PROCEDURE — 5A1D70Z PERFORMANCE OF URINARY FILTRATION, INTERMITTENT, LESS THAN 6 HOURS PER DAY: ICD-10-PCS | Performed by: INTERNAL MEDICINE

## 2018-03-22 PROCEDURE — 30233N1 TRANSFUSION OF NONAUTOLOGOUS RED BLOOD CELLS INTO PERIPHERAL VEIN, PERCUTANEOUS APPROACH: ICD-10-PCS | Performed by: INTERNAL MEDICINE

## 2018-03-22 NOTE — PN- DIABETES
Assessment/Plan Diabetes
Assessment:
57 y/o female hx of longstanding DM type 1, chronic renal disease due to 
diabetes and chronic interstitual nephritis and was treated with a couse of 
steroid. 
 
Patient was found to be unresponsive with glucose level was in the 20s. Blood 
work in ER showed inappropriately elevated insulin level.  Her severe 
hypoglycemia most likely was due to insulin overdose.
 
She received hydrocortisone 100 mg iv in ER. Patient was intubated. She was on 
D10 w and pressor. Now her BP and glucose level improved and she has been off on
D10 w and pressor. She is now on hydrocortisone 25 mg iv twice a day.
 
According to her insulin pump-- [employer] 630 G-- her basal insulin 36.75 units 
per 24 hours. 
 
She was on Levemir 16 units twice a day and Novolog coveage every 4 hours. 
Levemir was decreased to 12 units twice a day. However, Levemir was further 
decreased to 6 units twice a day; Nivolog coverage every 4 hours was adjusted. 
Her FSGs were 146, 210, 148 and 174. 
 
In addition, she was put on Levothyroxine 25 mcg iv daily.
 
She will receive HD today.
 
 
Plan:
1. continue Levemir 6 units twice a day for now;
2. continue the current Novolog coverage every 4 hours;
3. monitor FSGs and electrolytes.
4. continue hydrocortisone 25 mg iv twice a day for now.
will follow.
 
Subjective
Subjective:
Patient remains intubated.
 
Objective
Last 24 Hrs of Vital Signs/I&O
 Vital Signs
 
 
Date Time Temp Pulse Resp B/P B/P Pulse O2 O2 Flow FiO2
 
     Mean Ox Delivery Rate 
 
03/22 0840         30
 
03/22 0536         30
 
03/22 0400      96 Ventilator 30% 
 
03/22 0319         30
 
03/22 0051         30
 
03/22 0000      96 Ventilator 30% 
 
03/22 0000 98.2 92 20 164/85  96 Ventilator 30% 
 
03/21 2238         30
 
03/21 2000 99.0 94 20 145/64  96 Ventilator  
 
03/21 1944         30
 
03/21 1938       Ventilator  
 
03/21 1700         30
 
03/21 1600      98 Ventilator 30% 
 
03/21 1600 98.2 99 20 160/62  98 Ventilator 30% 
 
03/21 1428         30
 
03/21 1200      96 Ventilator 30% 
 
03/21 1138         30
 
 
 Intake & Output
 
 
 03/22 1600 03/22 0800 03/22 0000
 
Intake Total  57 257.6
 
Output Total   
 
Balance  57 257.6
 
    
 
Intake, IV  57 197.6
 
Intake, Other   60
 
Number  1 2
 
Bowel   
 
Movements   
 
Patient  176 lb 
 
Weight   
 
Weight  Bed scale 
 
Measurement   
 
Method   
 
 
 
 
Findings
Pertinent Lab/Sid Results:
 Laboratory Tests
 
 
 03/22 03/22
 
 1000 0600
 
Blood Gas  
 
  pH (7.35 - 7.45 PH)  7.48  H
 
  pCO2 (35 - 45 TORR)  28  L
 
  pO2 (80 - 100 TORR)  101  H
 
  HCO3 (21 - 28 MEQ/L)  21
 
  ABG O2 Sat (Measured) (>96.0 %)  97.0
 
  P-50 (Temp Corrected)  N
 
  Carboxyhemoglobin (1.5 - 5.0 %)  0.3  L
 
  O2 Concentration %  .30
 
  Respiration Rate (BPM)  20
 
  O2 Delivery Method  VENT
 
  Vent Mode  A/C
 
  Expiratory Pressure (CMH2O/P)  5
 
  Tidal Volume (CC)  500
 
Hematology  
 
  CBC w Diff Pending 
 
  WBC Pending 
 
  RBC Pending 
 
  Hgb Pending 
 
  Hct Pending 
 
  MCV Pending 
 
  MCH Pending 
 
  MCHC Pending 
 
  RDW Pending 
 
  Plt Count Pending 
 
  MPV Pending 
 
Miscellaneous  
 
  Phlebotomy Draw Site  RIGHT BRACHIAL
 
 
 
 
 03/22 03/21
 
 4102 2210
 
Chemistry  
 
  Sodium (137 - 145 mmol/L) 149  H 
 
  Potassium (3.5 - 5.1 mmol/L) 4.1 
 
  Chloride (98 - 107 mmol/L) 108  H 
 
  Carbon Dioxide (22 - 30 mmol/L) 23 
 
  Anion Gap (5 - 16) 18  H 
 
  BUN (7 - 17 mg/dL) 103 *H 
 
  Creatinine (0.5 - 1.0 mg/dL) 8.7 *H 
 
  Estimated GFR (>60 ml/min) 5  L 
 
  Glucose (65 - 99 mg/dL) 152  H 
 
  Calcium (8.4 - 10.2 mg/dL) 8.8 
 
  Phosphorus (2.5 - 4.5 mg/dL) 8.7  H 
 
  Magnesium (1.6 - 2.3 mg/dL) 2.1 
 
  Total Bilirubin (0.2 - 1.3 mg/dL) 0.8 
 
  AST (14 - 36 U/L) 41  H 
 
  ALT (9 - 52 U/L) 60  H 
 
  Albumin (3.5 - 5.0 g/dL) 2.2  L 
 
Coagulation  
 
  APTT (25 - 37 SEC)  50  H
 
Hematology  
 
  CBC w Diff MAN DIFF ORDERED 
 
  WBC (4.8 - 10.8 /CUMM) 11.8  H 
 
  RBC (4.20 - 5.40 /CUMM) 2.86  L 
 
  Hgb (12.0 - 16.0 G/DL) 7.6  L 
 
  Hct (37 - 47 %) 23.1  L 
 
  MCV (81.0 - 99.0 FL) 80.7  L 
 
  MCH (27.0 - 31.0 PG) 26.6  L 
 
  MCHC (33.0 - 37.0 G/DL) 33.0 
 
  RDW (11.5 - 14.5 %) 18.5  H 
 
  Plt Count (130 - 400 /CUMM) 310 
 
  MPV (7.4 - 10.4 FL) 7.6 
 
  Gran % (42.2 - 75.2 %) 82.2  H 
 
  Lymphocytes % (20.5 - 51.1 %) 14.4  L 
 
  Monocytes % (1.7 - 9.3 %) 3.3 
 
  Eosinophils % (0 - 5 %) 0 
 
  Basophils % (0.0 - 2.0 %) 0.1 
 
  Absolute Granulocytes (1.4 - 6.5 /CUMM) 9.7  H 
 
  Segmented Neutrophils (42.2 - 75.2 %) 85  H 
 
  Band Neutrophils (0.0 - 5.0 %) 1 
 
  Absolute Lymphocytes (1.2 - 3.4 /CUMM) 1.7 
 
  Lymphocytes (20.5 - 51.1 %) 11  L 
 
  Monocytes (1.7 - 9.3 %) 2 
 
  Absolute Monocytes (0.10 - 0.60 /CUMM) 0.4 
 
  Absolute Eosinophils (0.0 - 0.7 /CUMM) 0 
 
  Absolute Basophils (0.0 - 0.2 /CUMM) 0 
 
  Metamyelocytes (0.0 - 1.0 %) 1 
 
  Platelet Estimate (ADEQUATE) ADEQUATE 
 
  Polychromasia 1+ 
 
  Ovalocytes FEW 
 
Other Body Source  
 
  Fld Total RBCs Counted (%) 100

## 2018-03-22 NOTE — OPERATIVE REPORT
Operative/Inv Procedure Report
Surgery Date: 03/22/18
Name of Procedure:
Cystoscopy and insertion of pérez catheter
Pre-Operative Diagnosis:
Urinary retention, unable to place pérez at bedside
Post-Operative Diagnosis:
Same.  Also in mid urethra a false passage ventrally
Estimated Blood Loss: scant
Surgeon/Assistant:
Virgie
 
Anesthesia: general endotracheal tube
Drains:
18 fr  tip pérez catheter
Specimens:
none
Complications:
none
Condition:
critical
Operative Indication:
Critically ill patient in urinary retention and inability to place pérez at 
bedside
 
Operative/Procedure Note
Note:
The patient was taken to the operating room and identified.  She was placed on 
the operating table in the supine position.  Timeout was executed appropriately.
 Patient was already intubated.  Anesthesia was given.  She was then placed in 
the dorsal lithotomy position.  Bimanual exam revealed a palpable lower 
abdominal mass which was felt to be the bladder.  The uterus and cervix were not
palpable.  She was prepped and draped in usual fashion for cystoscopy.  Using 
the 22 [place] cystoscope with 30 lens urethroscopy was performed.  The very 
distal urethra was normal.  There was a sizable false passage of the urethra in 
the mid urethra ventrally.  Urethroscopy revealed that the true urethra was much
more superiorly located.  Therefore by angling the cystoscope toward the ceiling
the urethra could be followed into the bladder.  The bladder was noted to be 
distended.  It was otherwise grossly normal.  A guidewire was placed through the
cystoscope and the cystoscope removed leaving the wire in place.  Over the wire 
an 18 [place] tip catheter was placed.  10 mL was placed in the balloon. 
A large amount of urine drained from the bladder, proximally 800 mL after which 
are palpable lower abdominal mass resolved.  Patient tolerated the procedure 
reasonably well as completion was taken back to the intensive care unit in 
critical condition
Findings:
Distended bladder.  Elevated bladder neck from previous surgery.  Urethral false
passage and mid urethral ventrally
Discharge Disposition: Critical Care Unit

## 2018-03-22 NOTE — PN- UROLOGY
Surgical Brief Attending Note
Brief Attending Note:
Patient was brought to the OR and under cystoscopic guidance a guidewire was 
placed in the bladder and a pérez placed over the wire.  About 800 cc of urine 
drained from the bladder.  Urine is now mildly bloody.  Would leave pérez in 
place.  May irrigate prn.  If safe to do so would leave off heparin drip for 
about 6 more hours.  If unsafe to do so then restart heparin and keep PTT at 
lower end of therapeutic range and irrigate pérez prn.  Do not attempt to change
pérez.

## 2018-03-22 NOTE — RADIOLOGY REPORT
EXAMINATION:
XR PORTABLE CHEST
 
CLINICAL INFORMATION:
Intubation. Aspiration.
 
COMPARISON:
Portable chest March 21, 2018.
 
TECHNIQUE:
Portable frontal view of the chest was obtained. 6:00 AM
 
FINDINGS:
Endotracheal tube catheter is approximately 6 cm above the kaushal. The
nasogastric tube is coiled in the stomach.
The patchy airspace opacity in the right upper lobe is similar to prior chest
x-ray March 21, 2018. The left lung is clear. There is no pulmonary vascular
congestion. There is no pleural effusion.
 
IMPRESSION:
 
1. Endotracheal tube catheter 6 cm above the kaushal.
2. Nasogastric tube in stomach.
3. Persistent right upper lobe patchy airspace opacity.

## 2018-03-22 NOTE — PN- NEUROLOGY
Subjective
Subjective:
Remains unresponsive
 
Objective
Vital Signs and I&Os
Vital Signs
 
 
Date Time Temp Pulse Resp B/P B/P Pulse O2 O2 Flow FiO2
 
     Mean Ox Delivery Rate 
 
03/22 1124         30
 
03/22 0840         30
 
03/22 0536         30
 
03/22 0400      96 Ventilator 30% 
 
03/22 0319         30
 
03/22 0051         30
 
03/22 0000      96 Ventilator 30% 
 
03/22 0000 98.2 92 20 164/85  96 Ventilator 30% 
 
03/21 2238         30
 
03/21 2000 99.0 94 20 145/64  96 Ventilator  
 
03/21 1944         30
 
03/21 1938       Ventilator  
 
03/21 1700         30
 
03/21 1600      98 Ventilator 30% 
 
03/21 1600 98.2 99 20 160/62  98 Ventilator 30% 
 
03/21 1428         30
 
 
 Intake & Output
 
 
 03/22 1600 03/22 0800 03/22 0000 03/21 1600 03/21 0800 03/21 0000
 
Intake Total  57 257.6 365 175 400
 
Output Total      
 
Balance  57 257.6 365 175 400
 
       
 
Intake, IV  57 197.6 365 175 400
 
Intake, Other   60   
 
Number  1 2   
 
Bowel      
 
Movements      
 
Patient  176 lb  169 lb  
 
Weight      
 
Weight  Bed scale    
 
Measurement      
 
Method      
 
 
No response to verbal command.  Decerebrate posturing with right upper extremity
to sternal rub.  Pupils midposition and poorly reactive.  Corneal reflexes 
weakly positive.  Oculocephalic reflexes absent.  Bilateral Babinski signs are 
present.
Current Medications:
 Current Medications
 
 
  Sig/Susan Start time  Last
 
Medication Dose Route Stop Time Status Admin
 
Acetaminophen 1,000 MG Q6P PRN 03/19 1445 AC 03/20
 
  IV   0551
 
Ampicillin Sodium/ 3,000 MG Q12 03/19 1730 AC 03/22
 
Sulbactam Sodium  IV   1000
 
Sodium Chloride 100 ML    
 
Aspirin 81 MG DAILY 03/21 1000 AC 03/22
 
  PO   1010
 
Atorvastatin Calcium 40 MG 1700 03/21 1700 AC 03/21
 
  PO   1835
 
Dextrose/Sodium  1,000 ML Q20H 03/21 1830 CAN 
 
Chloride  IV   
 
Heparin Sodium  25,000 UNIT Q24H 03/19 1730 DC 03/21
 
(Porcine)  IV 03/22 0100  2152
 
Sodium Chloride 500 ML    
 
Hydrocortisone  25 MG BID 03/20 1000 AC 03/22
 
Sodium Succinate  IV   1010
 
Insulin Aspart 0 Q4 03/19 2200 AC 03/22
 
  SC   1252
 
Insulin Detemir 12 UNITS BID 03/21 2200 DC 
 
  SC   
 
Insulin Detemir 6 UNITS BID 03/21 2200 AC 03/22
 
  SC   1000
 
Insulin Detemir 12 UNITS ONCE ONE 03/21 1445 DC 03/21
 
  SC 03/21 1446  1447
 
Insulin Detemir 14 UNITS BID 03/21 1000 DC 
 
  SC   
 
Levothyroxine Sodium 25 MCG DAILY 03/20 1000 AC 03/22
 
  IV   1011
 
Lorazepam 1 MG Q4P PRN 03/20 1400 AC 
 
  IV   
 
Pantoprazole Sodium 40 MG DAILY 03/19 1730 AC 03/22
 
  IV   1010
 
Scopolamine HBr 1 PAT Q72H PRN 03/20 1600 AC 03/20
 
  TOP   1640
 
 
 
 
Assessment/Plan
Assessment:
#1 anoxic encephalopathy
 
#2 minimal brain function
 
#3 CT scan shows loss of gray-white differentiation indicative of diffuse 
cerebral edema
 
Prognosis poor
Plan:
The patient is about to be dialyzed.  Support continues for the present however,
as above, prognosis for functional recovery is extremely poor.  I have relayed 
my thoughts to the family.

## 2018-03-22 NOTE — PN- NEPHROLOGY
Assessment/Plan Nephrology
Assessment:
Dialysis today. Plan about 1.5L UF as tolerated with 2.5 hr Rx. Will plan 
dialysis again tomorrow.
Juan Denis MD
Suggestion:
.
 
 
Subjective
Subjective:
Pt intubated. No change in condition. Jose changed in OR.  oliguric.  
 
Objective
Vital Signs and I&Os
 
F intubated in ICU
164/85  98.2  
Lungs clear
Cor RRR
Abd soft
1+sacral edema
Tr LE edema
 
Results
Pertinent Lab Results:
 Laboratory Tests
 
 
 03/22 03/22
 
 0600 0425
 
Blood Gas  
 
  pH (7.35 - 7.45 PH) 7.48  H 
 
  pCO2 (35 - 45 TORR) 28  L 
 
  pO2 (80 - 100 TORR) 101  H 
 
  HCO3 (21 - 28 MEQ/L) 21 
 
  ABG O2 Sat (Measured) (>96.0 %) 97.0 
 
  P-50 (Temp Corrected) N 
 
  Carboxyhemoglobin (1.5 - 5.0 %) 0.3  L 
 
  O2 Concentration % .30 
 
  Respiration Rate (BPM) 20 
 
  O2 Delivery Method VENT 
 
  Vent Mode A/C 
 
  Expiratory Pressure (CMH2O/P) 5 
 
  Tidal Volume (CC) 500 
 
Chemistry  
 
  Sodium (137 - 145 mmol/L)  149  H
 
  Potassium (3.5 - 5.1 mmol/L)  4.1
 
  Chloride (98 - 107 mmol/L)  108  H
 
  Carbon Dioxide (22 - 30 mmol/L)  23
 
  Anion Gap (5 - 16)  18  H
 
  BUN (7 - 17 mg/dL)  103 *H
 
  Creatinine (0.5 - 1.0 mg/dL)  8.7 *H
 
  Estimated GFR (>60 ml/min)  5  L
 
  Glucose (65 - 99 mg/dL)  152  H
 
  Calcium (8.4 - 10.2 mg/dL)  8.8
 
  Phosphorus (2.5 - 4.5 mg/dL)  8.7  H
 
  Magnesium (1.6 - 2.3 mg/dL)  2.1
 
  Total Bilirubin (0.2 - 1.3 mg/dL)  0.8
 
  AST (14 - 36 U/L)  41  H
 
  ALT (9 - 52 U/L)  60  H
 
  Albumin (3.5 - 5.0 g/dL)  2.2  L
 
Hematology  
 
  CBC w Diff  MAN DIFF ORDERED
 
  WBC (4.8 - 10.8 /CUMM)  11.8  H
 
  RBC (4.20 - 5.40 /CUMM)  2.86  L
 
  Hgb (12.0 - 16.0 G/DL)  7.6  L
 
  Hct (37 - 47 %)  23.1  L
 
  MCV (81.0 - 99.0 FL)  80.7  L
 
  MCH (27.0 - 31.0 PG)  26.6  L
 
  MCHC (33.0 - 37.0 G/DL)  33.0
 
  RDW (11.5 - 14.5 %)  18.5  H
 
  Plt Count (130 - 400 /CUMM)  310
 
  MPV (7.4 - 10.4 FL)  7.6
 
  Gran % (42.2 - 75.2 %)  82.2  H
 
  Lymphocytes % (20.5 - 51.1 %)  14.4  L
 
  Monocytes % (1.7 - 9.3 %)  3.3
 
  Eosinophils % (0 - 5 %)  0
 
  Basophils % (0.0 - 2.0 %)  0.1
 
  Absolute Granulocytes (1.4 - 6.5 /CUMM)  9.7  H
 
  Segmented Neutrophils (42.2 - 75.2 %)  85  H
 
  Band Neutrophils (0.0 - 5.0 %)  1
 
  Absolute Lymphocytes (1.2 - 3.4 /CUMM)  1.7
 
  Lymphocytes (20.5 - 51.1 %)  11  L
 
  Monocytes (1.7 - 9.3 %)  2
 
  Absolute Monocytes (0.10 - 0.60 /CUMM)  0.4
 
  Absolute Eosinophils (0.0 - 0.7 /CUMM)  0
 
  Absolute Basophils (0.0 - 0.2 /CUMM)  0
 
  Metamyelocytes (0.0 - 1.0 %)  1
 
  Platelet Estimate (ADEQUATE)  ADEQUATE
 
  Polychromasia  1+
 
  Ovalocytes  FEW
 
Miscellaneous  
 
  Phlebotomy Draw Site RIGHT BRACHIAL 
 
Other Body Source  
 
  Fld Total RBCs Counted (%)  100
 
 
 
 
 03/21 03/21 03/21
 
 2210 1005 0620
 
Blood Gas   
 
  pH (7.35 - 7.45 PH)   7.43
 
  pCO2 (35 - 45 TORR)   30  L
 
  pO2 (80 - 100 TORR)   147  H
 
  HCO3 (21 - 28 MEQ/L)   19  L
 
  ABG O2 Sat (Measured) (>96.0 %)   98.0
 
  P-50 (Temp Corrected)   N
 
  Carboxyhemoglobin (1.5 - 5.0 %)   0.3  L
 
  O2 Concentration %   .40
 
  Respiration Rate (BPM)   20
 
  O2 Delivery Method   VENT
 
  Vent Mode   A/C
 
  Expiratory Pressure (CMH2O/P)   5
 
  Tidal Volume (CC)   500
 
Coagulation   
 
  APTT (25 - 37 SEC) 50  H 66  H 
 
Miscellaneous   
 
  Phlebotomy Draw Site   RIGHT BRACHIAL
 
 
 
 
 03/21 03/20
 
 0400 2200
 
Chemistry  
 
  Sodium (137 - 145 mmol/L) 149  H 
 
  Potassium (3.5 - 5.1 mmol/L) 4.1 
 
  Chloride (98 - 107 mmol/L) 106 
 
  Carbon Dioxide (22 - 30 mmol/L) 21  L 
 
  Anion Gap (5 - 16) 22  H 
 
  BUN (7 - 17 mg/dL) 102 *H 
 
  Creatinine (0.5 - 1.0 mg/dL) 7.6 *H 
 
  Estimated GFR (>60 ml/min) 5  L 
 
  Glucose (65 - 99 mg/dL) 109  H 
 
  Calcium (8.4 - 10.2 mg/dL) 7.8  L 
 
  Phosphorus (2.5 - 4.5 mg/dL) 8.4  H 
 
  Magnesium (1.6 - 2.3 mg/dL) 2.0 
 
  Total Bilirubin (0.2 - 1.3 mg/dL) 0.7 
 
  AST (14 - 36 U/L) 65  H 
 
  ALT (9 - 52 U/L) 89  H 
 
  Albumin (3.5 - 5.0 g/dL) 2.4  L 
 
Coagulation  
 
  APTT (25 - 37 SEC)  64  H
 
Hematology  
 
  CBC w Diff MAN DIFF ORDERED 
 
  WBC (4.8 - 10.8 /CUMM) 15.0  H 
 
  RBC (4.20 - 5.40 /CUMM) 3.30  L 
 
  Hgb (12.0 - 16.0 G/DL) 8.8  L 
 
  Hct (37 - 47 %) 26.8  L 
 
  MCV (81.0 - 99.0 FL) 81.3 
 
  MCH (27.0 - 31.0 PG) 26.5  L 
 
  MCHC (33.0 - 37.0 G/DL) 32.6  L 
 
  RDW (11.5 - 14.5 %) 19.3  H 
 
  Plt Count (130 - 400 /CUMM) 352 
 
  MPV (7.4 - 10.4 FL) 7.5 
 
  Gran % (42.2 - 75.2 %) 86.5  H 
 
  Lymphocytes % (20.5 - 51.1 %) 10.5  L 
 
  Monocytes % (1.7 - 9.3 %) 2.8 
 
  Eosinophils % (0 - 5 %) 0 
 
  Basophils % (0.0 - 2.0 %) 0.2 
 
  Absolute Granulocytes (1.4 - 6.5 /CUMM) 13.0  H 
 
  Segmented Neutrophils (42.2 - 75.2 %) 84  H 
 
  Band Neutrophils (0.0 - 5.0 %) 5 
 
  Absolute Lymphocytes (1.2 - 3.4 /CUMM) 1.6 
 
  Lymphocytes (20.5 - 51.1 %) 9  L 
 
  Monocytes (1.7 - 9.3 %) 2 
 
  Absolute Monocytes (0.10 - 0.60 /CUMM) 0.4 
 
  Absolute Eosinophils (0.0 - 0.7 /CUMM) 0 
 
  Absolute Basophils (0.0 - 0.2 /CUMM) 0 
 
  Platelet Estimate (ADEQUATE) ADEQUATE 
 
  Polychromasia 1+ 
 
  Anisocytosis 1+ 
 
  Target Cells FEW 
 
  Stomatocytes FEW 
 
  Lilliam Cells RARE 
 
  Elliptocytes 1+ 
 
Serology  
 
  Hep Bs Antigen (NONREACTIVE) NONREACTIVE 
 
  Hep Bs Antibody (NONREACTIVE) NONREACTIVE 
 
 
 
 
 03/20 03/20 03/20
 
 1445 1115 0835
 
Chemistry   
 
  Lactic Acid (0.7 - 2.1 mmol/L) 2.0 2.5  H 
 
  Troponin I (< 0.11 ng/ml)   3.74 *H
 
Coagulation   
 
  APTT (25 - 37 SEC) 39  H  
 
Hematology   
 
  CBC w Diff MAN DIFF ORDERED  
 
  WBC (4.8 - 10.8 /CUMM) 14.2  H  
 
  RBC (4.20 - 5.40 /CUMM) 3.30  L  
 
  Hgb (12.0 - 16.0 G/DL) 8.6  L  
 
  Hct (37 - 47 %) 26.3  L  
 
  MCV (81.0 - 99.0 FL) 79.5  L  
 
  MCH (27.0 - 31.0 PG) 26.2  L  
 
  MCHC (33.0 - 37.0 G/DL) 33.0  
 
  RDW (11.5 - 14.5 %) 18.7  H  
 
  Plt Count (130 - 400 /CUMM) 338  
 
  MPV (7.4 - 10.4 FL) 7.1  L  
 
  Gran % (42.2 - 75.2 %) 85.9  H  
 
  Lymphocytes % (20.5 - 51.1 %) 10.7  L  
 
  Monocytes % (1.7 - 9.3 %) 3.2  
 
  Eosinophils % (0 - 5 %) 0  
 
  Basophils % (0.0 - 2.0 %) 0.2  
 
  Absolute Granulocytes (1.4 - 6.5 /CUMM) 12.2  H  
 
  Segmented Neutrophils (42.2 - 75.2 %) 82  H  
 
  Band Neutrophils (0.0 - 5.0 %) 7  H  
 
  Absolute Lymphocytes (1.2 - 3.4 /CUMM) 1.5  
 
  Lymphocytes (20.5 - 51.1 %) 8  L  
 
  Monocytes (1.7 - 9.3 %) 3  
 
  Absolute Monocytes (0.10 - 0.60 /CUMM) 0.4  
 
  Absolute Eosinophils (0.0 - 0.7 /CUMM) 0  
 
  Absolute Basophils (0.0 - 0.2 /CUMM) 0  
 
  Platelet Estimate (ADEQUATE) ADEQUATE  
 
  Polychromasia     
 
  Hypochromic-Microcytic 1+  
 
  Anisocytosis 1+  
 
  Microcytic Cells 1+  
 
 
 
 
 03/20 03/20 03/20
 
 0835 0655 0600
 
Blood Gas   
 
  pH (7.35 - 7.45 PH)  7.48  H 
 
  pCO2 (35 - 45 TORR)  27  L 
 
  pO2 (80 - 100 TORR)  219  H 
 
  HCO3 (21 - 28 MEQ/L)  20  L 
 
  ABG O2 Sat (Measured) (>96.0 %)  99.0 
 
  P-50 (Temp Corrected)  N 
 
  Carboxyhemoglobin (1.5 - 5.0 %)  0.4  L 
 
  O2 Concentration %  .65 
 
  Respiration Rate (BPM)  30 
 
  O2 Delivery Method  VENT 
 
  Vent Mode  A/C 
 
  Expiratory Pressure (CMH2O/P)  5 
 
  Tidal Volume (CC)  500 
 
Chemistry   
 
  Lactic Acid (0.7 - 2.1 mmol/L) 3.4  H  
 
  Troponin I   Cancelled
 
Miscellaneous   
 
  Phlebotomy Draw Site  RIGHT BRACHIAL 
 
 
 
 
 03/20 03/20 03/20
 
 0500 0230 0230
 
Chemistry   
 
  Sodium (137 - 145 mmol/L) 145  
 
  Potassium (3.5 - 5.1 mmol/L) 4.6  
 
  Chloride (98 - 107 mmol/L) 105  
 
  Carbon Dioxide (22 - 30 mmol/L) 22  
 
  Anion Gap (5 - 16) 18  H  
 
  BUN (7 - 17 mg/dL) 97  H  
 
  Creatinine (0.5 - 1.0 mg/dL) 6.5 *H  
 
  Estimated GFR (>60 ml/min) 7  L  
 
  Glucose (65 - 99 mg/dL) 295  H  
 
  Insulin Level (3.0 - 25.0 mIU/mL) 88.5  H  
 
  Lactic Acid (0.7 - 2.1 mmol/L) 3.0  H  3.2  H
 
  Calcium (8.4 - 10.2 mg/dL) 6.7  L  
 
  Phosphorus (2.5 - 4.5 mg/dL) 8.3  H  
 
  Magnesium (1.6 - 2.3 mg/dL) 1.6  
 
  Total Bilirubin (0.2 - 1.3 mg/dL) 0.4  
 
  AST (14 - 36 U/L) 138  H  
 
  ALT (9 - 52 U/L) 124  H  
 
  Troponin I (< 0.11 ng/ml)  4.20 *H 
 
  Albumin (3.5 - 5.0 g/dL) 2.1  L  
 
Coagulation   
 
  APTT (25 - 37 SEC)   63  H
 
Hematology   
 
  CBC w Diff MAN DIFF ORDERED  
 
  WBC (4.8 - 10.8 /CUMM) 15.5  H  
 
  RBC (4.20 - 5.40 /CUMM) 3.33  L  
 
  Hgb (12.0 - 16.0 G/DL) 8.6  L  
 
  Hct (37 - 47 %) 26.8  L  
 
  MCV (81.0 - 99.0 FL) 80.5  L  
 
  MCH (27.0 - 31.0 PG) 26.0  L  
 
  MCHC (33.0 - 37.0 G/DL) 32.2  L  
 
  RDW (11.5 - 14.5 %) 18.8  H  
 
  Plt Count (130 - 400 /CUMM) 319  
 
  MPV (7.4 - 10.4 FL) 7.2  L  
 
  Gran % (42.2 - 75.2 %) 88.2  H  
 
  Lymphocytes % (20.5 - 51.1 %) 8.3  L  
 
  Monocytes % (1.7 - 9.3 %) 3.4  
 
  Eosinophils % (0 - 5 %) 0  
 
  Basophils % (0.0 - 2.0 %) 0.1  
 
  Absolute Granulocytes (1.4 - 6.5 /CUMM) 13.7  H  
 
  Segmented Neutrophils (42.2 - 75.2 %) 81  H  
 
  Band Neutrophils (0.0 - 5.0 %) 4  
 
  Absolute Lymphocytes (1.2 - 3.4 /CUMM) 1.3  
 
  Lymphocytes (20.5 - 51.1 %) 13  L  
 
  Monocytes (1.7 - 9.3 %) 2  
 
  Absolute Monocytes (0.10 - 0.60 /CUMM) 0.5  
 
  Absolute Eosinophils (0.0 - 0.7 /CUMM) 0  
 
  Absolute Basophils (0.0 - 0.2 /CUMM) 0  
 
  Platelet Estimate (ADEQUATE) ADEQUATE  
 
  Polychromasia 1+  
 
  Hypochromic-Microcytic 1+  
 
  Poikilocytosis 2+  
 
  Basophilic Stippling SLIGHT  
 
  Anisocytosis 1+  
 
  Microcytic Cells 1+  
 
  Ovalocytes 1+  
 
  Stomatocytes 1+  
 
Other Body Source   
 
  Fld Total RBCs Counted (%) 100  
 
 
 
 
 03/19 03/19 03/19 03/19
 
 2257 225 2245 2239
 
Blood Gas    
 
  pH (7.35 - 7.45 PH)    7.29 *L
 
  pCO2 (35 - 45 TORR)    33  L
 
  pO2 (80 - 100 TORR)    87
 
  HCO3 (21 - 28 MEQ/L)    16  L
 
  ABG O2 Sat (Measured) (>96.0 %)    96.0
 
  P-50 (Temp Corrected)    Y
 
  Carboxyhemoglobin (1.5 - 5.0 %)    0.3  L
 
  O2 Concentration %    65%
 
  Temperature (97.0 - 100.0 FARH)    96.4  L
 
  Respiration Rate (BPM)    30
 
  O2 Delivery Method    ESPRIT
 
  Vent Mode    AC
 
  Expiratory Pressure (CMH2O/P)    5
 
  Tidal Volume (CC)    500
 
Chemistry    
 
  Sodium (137 - 145 mmol/L)  144  
 
  Potassium (3.5 - 5.1 mmol/L)  4.9  
 
  Chloride (98 - 107 mmol/L)  106  
 
  Carbon Dioxide (22 - 30 mmol/L)  17  L  
 
  Anion Gap (5 - 16)  20  H  
 
  BUN (7 - 17 mg/dL)  96  H  
 
  Creatinine (0.5 - 1.0 mg/dL)  6.2 *H  
 
  Estimated GFR (>60 ml/min)  7  L  
 
  Glucose (65 - 99 mg/dL)  328  H  
 
  Lactic Acid (0.7 - 2.1 mmol/L) 3.3  H   
 
  Calcium (8.4 - 10.2 mg/dL)  6.9  L  
 
  Phosphorus (2.5 - 4.5 mg/dL)  8.9  H  
 
  Magnesium (1.6 - 2.3 mg/dL)  1.7  
 
  Total Bilirubin (0.2 - 1.3 mg/dL)  0.4  
 
  AST (14 - 36 U/L)  221  H  
 
  ALT (9 - 52 U/L)  149  H  
 
  Albumin (3.5 - 5.0 g/dL)  2.3  L  
 
Miscellaneous    
 
  Phlebotomy Draw Site    RIGHT BRACHIAL
 
Toxicology    
 
  Urine Opiates Screen (>2000 NG/ML)   < 100 
 
  Methadone Screen (>300 NG/ML)   < 40 
 
  Barbiturate Screen (>200 NG/ML)   < 60 
 
  Ur Phencyclidine Scrn (>25 NG/ML)   < 6.00 
 
  Amphetamines Screen (>1000 NG/ML)   < 100 
 
  U Benzodiazepines Scrn (>200 NG/ML)   < 85 
 
  Urine Cocaine Screen (>300 NG/ML)   < 50 
 
  Urine Cannabis Screen (>50 NG/ML)   < 5.00 
 
 
 
 
 03/19 03/19 03/19 03/19 2057 1931 1931 1710
 
Blood Gas    
 
  pH (7.35 - 7.45 PH)    7.06 *L
 
  pCO2 (35 - 45 TORR)    42
 
  pO2 (80 - 100 TORR)    167  H
 
  HCO3 (21 - 28 MEQ/L)    12  L
 
  ABG O2 Sat (Measured) (>96.0 %)    97.0
 
  P-50 (Temp Corrected)    N
 
  Carboxyhemoglobin (1.5 - 5.0 %)    0.3  L
 
  O2 Concentration %    100%
 
  Temperature (97.0 - 100.0 FARH)    97.5
 
  Respiration Rate (BPM)    26
 
  O2 Delivery Method    ESPRIT VENT
 
  Vent Mode    AC
 
  Expiratory Pressure (CMH2O/P)    5
 
  Tidal Volume (CC)    500
 
Chemistry    
 
  Sodium (137 - 145 mmol/L)   144 
 
  Potassium (3.5 - 5.1 mmol/L)   5.6  H 
 
  Chloride (98 - 107 mmol/L)   108  H 
 
  Carbon Dioxide (22 - 30 mmol/L)   15  L 
 
  Anion Gap (5 - 16)   20  H 
 
  BUN (7 - 17 mg/dL)   95  H 
 
  Creatinine (0.5 - 1.0 mg/dL)   6.3 *H 
 
  Estimated GFR (>60 ml/min)   7  L 
 
  Glucose (65 - 99 mg/dL)   228  H 
 
  Lactic Acid (0.7 - 2.1 mmol/L)  3.8  H  
 
  Calcium (8.4 - 10.2 mg/dL)   7.2  L 
 
  Phosphorus (2.5 - 4.5 mg/dL)   10.7  H 
 
  Magnesium (1.6 - 2.3 mg/dL)   1.7 
 
  Total Bilirubin (0.2 - 1.3 mg/dL)   0.5 
 
  AST (14 - 36 U/L)   253  H 
 
  ALT (9 - 52 U/L)   161  H 
 
  Troponin I (< 0.11 ng/ml) 3.46 *H   
 
  Albumin (3.5 - 5.0 g/dL)   2.3  L 
 
Coagulation    
 
  PT (9.4 - 12.5 SEC)   13.1  H 
 
  INR (0.90 - 1.19)   1.20  H 
 
  APTT (25 - 37 SEC)   28 
 
Miscellaneous    
 
  Phlebotomy Draw Site    RIGHT BRACHIAL
 
 
 
 
 03/19 03/19 03/19
 
 1510 1307 1140
 
Blood Gas   
 
  pH (7.35 - 7.45 PH)   7.29 *L
 
  pCO2 (35 - 45 TORR)   32  L
 
  pO2 (80 - 100 TORR)   238  H
 
  HCO3 (21 - 28 MEQ/L)   15  L
 
  ABG O2 Sat (Measured) (>96.0 %)   98.0
 
  Carboxyhemoglobin (1.5 - 5.0 %)   0.3  L
 
  O2 Concentration %   100%
 
  Respiration Rate (BPM)   24
 
  O2 Delivery Method   BIPAP
 
  Vent Mode   ST
 
  Expiratory Pressure (CM H2O P)   6
 
  Inspiratory Pressure (CM H2O P)   16
 
Chemistry   
 
  Lactic Acid (0.7 - 2.1 mmol/L)  2.0 
 
  Troponin I (< 0.11 ng/ml) 3.04 *H  
 
Miscellaneous   
 
  Phlebotomy Draw Site   RIGHT RADIAL

## 2018-03-22 NOTE — PN- CARDIOLOGY
Subjective
Subjective:
* No change in neurologic status.
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
* sinus rhythm
* creatinine is 8.7
* troponin peaked at 4.2 and is trending down
* Increased WBC count with low grade fever and right lung infiltrate
* stable anemia
 
Objective
Vital Signs and I&Os
Vital Signs
 
 
Date Time Temp Pulse Resp B/P B/P Pulse O2 O2 Flow FiO2
 
     Mean Ox Delivery Rate 
 
03/22 2000      98 Ventilator 30% 
 
03/22 1925         30
 
03/22 1657         30
 
03/22 1600      96 Ventilator 30% 
 
03/22 1600 97.0 72 20 147/63  96 Ventilator 30% 
 
03/22 1423         30
 
03/22 1200      8 Ventilator 30% 
 
03/22 1124         30
 
03/22 0840         30
 
03/22 0800      96 Ventilator 35% 
 
03/22 0800 96.4 114 20 167/70  96 Ventilator 30% 
 
03/22 0536         30
 
03/22 0400      96 Ventilator 30% 
 
03/22 0319         30
 
03/22 0051         30
 
03/22 0000      96 Ventilator 30% 
 
03/22 0000 98.2 92 20 164/85  96 Ventilator 30% 
 
03/21 2238         30
 
 
 Intake & Output
 
 
 03/22 1600 03/22 0800 03/22 0000 03/21 1600 03/21 0800 03/21 0000
 
Intake Total 100 57 257.6 365 175 400
 
Output Total 300     
 
Balance -200 57 257.6 365 175 400
 
       
 
Intake,  57 197.6 365 175 400
 
Intake, Other   60   
 
Number 1 1 2   
 
Bowel      
 
Movements      
 
Output, Urine 300     
 
Patient  176 lb  169 lb  
 
Weight      
 
Weight  Bed scale    
 
Measurement      
 
Method      
 
 
 
Physical Exam:
General: WD/overweight female. Intubated.
HEENT: NC/AT, pupils reactive to light, no dolls eyes
Neck: no JVD, no carotid bruit
Heart: RRR w/o murmur
Lungs: clear bilaterally
Abdomen: soft, NT, +ve bowel sounds
Extremities: no edema
Neuro: some spontaneous movement of legs bilaterally
 
Assessment/Plan
Assessment/Plan
* This patient has evidence of coma likely related to prolonged hypoglycemia. 
Changes on her head CT are suggestive of anoxic injury. 
* This patient has evidence of a NSTEMI with cardiac enzymes trending down. I 
suspect that her MI is a type 2 from supply demand mismatch rather than from an 
acute ruptured intracoronary plaque. H/H is improved. Okay to stop IV heparin at
this point in time.  I would not begin beta blockers at this point in time due 
to her becoming bradycardic and going into asystole yesterday.
* Continue antibiotics for a likely aspiration pneumonia.
Continue telemetry? Yes

## 2018-03-22 NOTE — PN- INFECT DX
Subjective
Subjective:
Afebrile on steroids.  She remains unresponsive.
 
Objective
Last 24 Hrs of Vital Signs/I&O
 Vital Signs
 
 
Date Time Temp Pulse Resp B/P B/P Pulse O2 O2 Flow FiO2
 
     Mean Ox Delivery Rate 
 
03/22 0840         30
 
03/22 0536         30
 
03/22 0400      96 Ventilator 30% 
 
03/22 0319         30
 
03/22 0051         30
 
03/22 0000      96 Ventilator 30% 
 
03/22 0000 98.2 92 20 164/85  96 Ventilator 30% 
 
03/21 2238         30
 
03/21 2000 99.0 94 20 145/64  96 Ventilator  
 
03/21 1944         30
 
03/21 1938       Ventilator  
 
03/21 1700         30
 
03/21 1600      98 Ventilator 30% 
 
03/21 1600 98.2 99 20 160/62  98 Ventilator 30% 
 
03/21 1428         30
 
03/21 1200      96 Ventilator 30% 
 
03/21 1138         30
 
 
 Intake & Output
 
 
 03/22 1600 03/22 0800 03/22 0000
 
Intake Total  57 257.6
 
Output Total   
 
Balance  57 257.6
 
    
 
Intake, IV  57 197.6
 
Intake, Other   60
 
Number  1 2
 
Bowel   
 
Movements   
 
Patient  176 lb 
 
Weight   
 
Weight  Bed scale 
 
Measurement   
 
Method   
 
 
 
 
Physical Exam
Other Physical Findings:
She is unresponsive on the ventilator
Lungs are clear
Heart regular rhythm with no murmur
Abdomen is soft, positive bowel sounds
Extremities no cyanosis, clubbing or edema; right femoral triple lumen catheter 
in place with no inflammation at the site
 Jose catheter in place
 
 
Results
Last 24 Hours of Lab Results:
 Laboratory Tests
 
 
 03/22 03/22
 
 0600 0425
 
Blood Gas  
 
  pH (7.35 - 7.45 PH) 7.48  H 
 
  pCO2 (35 - 45 TORR) 28  L 
 
  pO2 (80 - 100 TORR) 101  H 
 
  HCO3 (21 - 28 MEQ/L) 21 
 
  ABG O2 Sat (Measured) (>96.0 %) 97.0 
 
  P-50 (Temp Corrected) N 
 
  Carboxyhemoglobin (1.5 - 5.0 %) 0.3  L 
 
  O2 Concentration % .30 
 
  Respiration Rate (BPM) 20 
 
  O2 Delivery Method VENT 
 
  Vent Mode A/C 
 
  Expiratory Pressure (CMH2O/P) 5 
 
  Tidal Volume (CC) 500 
 
Chemistry  
 
  Sodium (137 - 145 mmol/L)  149  H
 
  Potassium (3.5 - 5.1 mmol/L)  4.1
 
  Chloride (98 - 107 mmol/L)  108  H
 
  Carbon Dioxide (22 - 30 mmol/L)  23
 
  Anion Gap (5 - 16)  18  H
 
  BUN (7 - 17 mg/dL)  103 *H
 
  Creatinine (0.5 - 1.0 mg/dL)  8.7 *H
 
  Estimated GFR (>60 ml/min)  5  L
 
  Glucose (65 - 99 mg/dL)  152  H
 
  Calcium (8.4 - 10.2 mg/dL)  8.8
 
  Phosphorus (2.5 - 4.5 mg/dL)  8.7  H
 
  Magnesium (1.6 - 2.3 mg/dL)  2.1
 
  Total Bilirubin (0.2 - 1.3 mg/dL)  0.8
 
  AST (14 - 36 U/L)  41  H
 
  ALT (9 - 52 U/L)  60  H
 
  Albumin (3.5 - 5.0 g/dL)  2.2  L
 
Hematology  
 
  CBC w Diff  MAN DIFF ORDERED
 
  WBC (4.8 - 10.8 /CUMM)  11.8  H
 
  RBC (4.20 - 5.40 /CUMM)  2.86  L
 
  Hgb (12.0 - 16.0 G/DL)  7.6  L
 
  Hct (37 - 47 %)  23.1  L
 
  MCV (81.0 - 99.0 FL)  80.7  L
 
  MCH (27.0 - 31.0 PG)  26.6  L
 
  MCHC (33.0 - 37.0 G/DL)  33.0
 
  RDW (11.5 - 14.5 %)  18.5  H
 
  Plt Count (130 - 400 /CUMM)  310
 
  MPV (7.4 - 10.4 FL)  7.6
 
  Gran % (42.2 - 75.2 %)  82.2  H
 
  Lymphocytes % (20.5 - 51.1 %)  14.4  L
 
  Monocytes % (1.7 - 9.3 %)  3.3
 
  Eosinophils % (0 - 5 %)  0
 
  Basophils % (0.0 - 2.0 %)  0.1
 
  Absolute Granulocytes (1.4 - 6.5 /CUMM)  9.7  H
 
  Segmented Neutrophils (42.2 - 75.2 %)  85  H
 
  Band Neutrophils (0.0 - 5.0 %)  1
 
  Absolute Lymphocytes (1.2 - 3.4 /CUMM)  1.7
 
  Lymphocytes (20.5 - 51.1 %)  11  L
 
  Monocytes (1.7 - 9.3 %)  2
 
  Absolute Monocytes (0.10 - 0.60 /CUMM)  0.4
 
  Absolute Eosinophils (0.0 - 0.7 /CUMM)  0
 
  Absolute Basophils (0.0 - 0.2 /CUMM)  0
 
  Metamyelocytes (0.0 - 1.0 %)  1
 
  Platelet Estimate (ADEQUATE)  ADEQUATE
 
  Polychromasia  1+
 
  Ovalocytes  FEW
 
Miscellaneous  
 
  Phlebotomy Draw Site RIGHT BRACHIAL 
 
Other Body Source  
 
  Fld Total RBCs Counted (%)  100
 
 
 
 
 03/21 03/21
 
 2210 1005
 
Coagulation  
 
  APTT (25 - 37 SEC) 50  H 66  H
 
 
 
Last 24 Hours of Sid Results:
Blood cultures March 19/March 20 negative
 
Recent Imaging Studies:
CT of the head March 21 reveals relatively extensive loss of gray-white matter 
differentiation consistent with hypoxic ischemic injury
 
CT of the chest revealed a lobulated mass in the right upper lobe not 
significantly changed in size; new patchy areas of ground glass opacification in
the right upper lobe posteriorly and the superior segments of both lower lobes.
 
Chest x-ray March 22 reveals a persistent right upper lobe mass
 
 
Assessment/Plan ID
Impression:
Condition remains poor, remaining unresponsive status post a cardiac arrest on 
admission, with evidence of hypoxic ischemic injury on her recent CT scan.  Her 
renal failure has progressed, possibly in part secondary to urinary retention, 
status post removal of 800 mL of urine upon placement of a Jose catheter in the
OR earlier this morning, and she is scheduled for dialysis later today.  She 
remains afebrile (on steroids for interstitial nephritis), with her white blood 
cell count decreased today, on Unasyn Day 4 of treatment for possible aspiration
pneumonia, with her CT of the chest revealing bilateral patchy densities in 
addition to the chronic right upper lobe mass that was biopsied 4 months prior 
to admission.
 
Suggestion:
1.  Await dialysis
2.  Remove right femoral triple lumen catheter
3.  Continue Unasyn

## 2018-03-22 NOTE — PN- RESIDENT CRCU
**See Addendum**
Subjective
HPI/CRCU Issues:
Overnight issues:
Patient IV heparin was shut off at approximately 1 AM in anticipation for Pérez 
placement in the OR.  Patient is to go to dialysis this morning. 
 
No acute events overnight
Patient continues to remain unresponsiveness
 
Vitals:
T max: 99, HR: 80s to 100s, RR: 20, BP: 167/70, Saturating at 96% on mechanical 
ventilation
 
 
Labs:
WBC 11.8 with 82.2% granulocytes, 85 segmented neutrophils, 1 band (down from 
15.0), H&H 7.6 and 23.1, platelets 310
 
Sodium 149, potassium 4.1, chloride 108, bicarbonate 23, anion gap 18, , 
creatinine 8.7, glucose 152, calcium 8.8, phosphorus 8.7, magnesium 2.1, T bili 
0.8, AST 41, ALT 60, albumin 2.2
 
Blood gas: PH 7.48, PCO2 28, PO2 101, bicarbonate 21, ABG O2 sat 97.0
 
Microbiology:
No growth in cultures to date
 
Imaging:
Chest x-ray:
Endotracheal tube is approximately 6 cm above the kaushal, NG tube in stomach, 
persistent right upper lobe patchy airspace opacity
 
 
Objective
 
Vital Signs & I&O
Last 8 Hrs of Vitals and I&O:
 Vital Signs
 
 
Date Time Temp Pulse Resp B/P B/P Pulse O2 O2 Flow FiO2
 
     Mean Ox Delivery Rate 
 
 0840         30
 
 0536         30
 
 0400      96 Ventilator 30% 
 
 0319         30
 
 0051         30
 
 0000      96 Ventilator 30% 
 
 0000 98.2 92 20 164/85  96 Ventilator 30% 
 
 2238         30
 
 2000 99.0 94 20 145/64  96 Ventilator  
 
 1944         30
 
 1938       Ventilator  
 
 1700         30
 
 1600      98 Ventilator 30% 
 
 1600 98.2 99 20 160/62  98 Ventilator 30% 
 
 1428         30
 
 1200      96 Ventilator 30% 
 
 1138         30
 
 
 Intake & Output
 
 
  1600  0800  0000
 
Intake Total  57 257.6
 
Output Total   
 
Balance  57 257.6
 
    
 
Intake, IV  57 197.6
 
Intake, Other   60
 
Number  1 2
 
Bowel   
 
Movements   
 
Patient  176 lb 
 
Weight   
 
Weight  Bed scale 
 
Measurement   
 
Method   
 
 
 
 
Exam
General Appearance: intubated, unresponsive to verbal or tacticle stimuli, is 
currently off sedation, moves legs spontaneously
Head: atraumatic
Respiratory: normal breath sounds, lungs clear (to anterior auscultation)
Cardiovascular: regular rate/rhythm, edema
Gastrointestinal: normal bowel sounds, soft, non-tender
Extremities: normal inspection, no lower extremity edema, hands appear edematous
Cranial Nerves: PERRL, uunable to assess full neurologic exam due to 
unresponsiveness
Skin Temp/Moisture Exam: Warm/Dry
Pérez
   Date In: 18
   Still Needed? Yes
 
IV Drips
IV Drips:
IV Heparin
Current Medications:
 Current Medications
 
 
  Sig/Susan Start time  Last
 
Medication Dose Route Stop Time Status Admin
 
Acetaminophen 1,000 MG Q6P PRN  1445 AC 
 
  IV   0551
 
Ampicillin Sodium/ 3,000 MG Q12  1730 AC 
 
Sulbactam Sodium  IV   1000
 
Sodium Chloride 100 ML    
 
Aspirin 81 MG DAILY  1000 AC 
 
  PO   1010
 
Atorvastatin Calcium 40 MG 1700  1700 AC 
 
  PO   1835
 
Dextrose/Sodium  1,000 ML Q20H  1830 CAN 
 
Chloride  IV   
 
Fentanyl Citrate 200 MCG .STK-MED ONE  0721 DC 
 
  IM  0722  
 
Heparin Sodium  25,000 UNIT Q24H  1730 DC 
 
(Porcine)  IV  0100  2152
 
Sodium Chloride 500 ML    
 
Hydrocortisone  25 MG BID  1000 AC 
 
Sodium Succinate  IV   1010
 
Insulin Aspart 0 Q4  2200 AC 
 
  SC   1252
 
Insulin Detemir 12 UNITS BID  2200 DC 
 
  SC   
 
Insulin Detemir 6 UNITS BID  2200 AC 
 
  SC   1000
 
Levothyroxine Sodium 25 MCG DAILY  1000 AC 
 
  IV   1011
 
Lorazepam 1 MG Q4P PRN  1400 AC 
 
  IV   
 
Midazolam HCl 2 MG .STK-MED ONE  0722 DC 
 
  IM  0723  
 
Pantoprazole Sodium 40 MG DAILY  1730 AC 
 
  IV   1010
 
Scopolamine HBr 1 PAT Q72H PRN  1600 AC 
 
  TOP   1640
 
 
 
 
Impression/Plan
 
Impression/Problem List
Impression:
This is 58-year-old female with a medical history of stage 5 CKD(nephrotic 
proteinuria, diabetic nephropathy and retinopathy) with left AV fistula placed 
couple month ago(still making urine), currently not on hemodialysis, has only 
one kidney since childhood.  Chronic anemia, hypertension, type 1 diabetes on 
insulin pump, hypothyroidism, ankylosing spondylitis on chronic prednisone, 
depression, GERD, right lung mass according to the family the biopsy came back 
as a benign, hyperparathyroidism due to renal insufficiency, chronic pain.  
Presented to the emergency department via EMS due to unresponsiveness.
 
 
Patient is admitted to the ICU for management of the following: 
 
Respiratory: 
 
Acute hypoxic respiratory failure 2/2 to aspiration pneumonia on mechanical 
ventilation: 
Patient continues to remain on mechanical ventilation. Oxygen requirement 
continues to improve with decreased FiO2. Patient's remains unresponsive off 
sedation. Patient has respiratory alkalosis, can decreased the rate and decrease
FiO2 today. 
 
- continue mechanical ventilation
- ativan 1mg q4h PRN for agitation 
- Repeat chest x-ray in a.m.
- Will assess tomorrow if ABG is needed.
 
Infection: 
Aspiration Pneumonia: 
Patient continues to be tachycardic, febrile with elevated leukocytosis which is
downtrending on IV Unasyn.  No growth on cultures thus far.  Repeat chest xray 
shows persistent bilateral airspace opacities with largest area of consolidation
in the right upper lobe. Patient remains on ventilatory support. CT Chest 
showing lobulated mass in the right upper lobe medially is not significantly 
changed in size. There are now patchy areas of ground glass opacification 
nodularity in both lungs, which may be consistent with multifocal pneumonic 
infiltrates.
 
- continue IV Unasyn 
- continue to monitor CBCs, vitals
- Follow-up blood cultures, sputum culture
- ID on board.  Appreciate recommendations.
 
Cardiac:
Status post cardiac arrest in the ED
New right bundle branch block, wide QRS tachycardia
NSTEMI- likely 2/2 to demand ischemia
Patient's troponin's peaked to 4.20. Patient remains unresponsive on mechanical 
ventilator. Patient's heparin was temporarily stopped on 3/20 due to concerns of
bleeding from pérez site. Patient was seen by cardiology. H/H was stable and 
patient's heparin was shortly after on 3/20.
- IV heparin can be discontinued per cardiology. Patient's H/H has been dropping
today. 
- continue aspirin and statin
 
Heme: 
 
Anemia
Patient has a history of chronic anemia. H/H dropped to 7.6 this morning. 
Patient had pérez placement in the OR today with continued bleeding which is 
expected per urology. Patient is also having guiac positive stools. Repeat H/H 
after was 7.1. Patient is to be transfused 1 unit pRBC with dialysis today. 
 
- CBC post transfusion 
- Transfuse for H/H < 7 
 
Metabolic: 
 
Type 1 Diabetes - on Insulin Pump
Patient initially presented with hypoglycemia w/ BG of 27. Patients BG over the 
past 24 hours was in the high 200s to high 300s. 
- Endocrinology on board. Appreciate recommendations 
- Continue levemir to 6 units BID 
- Insulin SS adjusted per endocrinology recommendations 
- Target BG per endocrinology: FSG of 100 to 200. 
 
Adrenal Insufficiency. Patient is on chronic prednisone for interstitital 
nephritis.
- Stress dose steroids with IV hydrocortisone 25mg IV BID 
 
Metabolic acidosis with elevated anion gap and lactic acidosis - Resolved
Patient's anion gap is down from previous day. Lactic acid was previously 
fluctuating however is now downtrending. Patient was previously getting IV 
Bicarb. 
 
Alimentary:
 
Patient is currently NPO due to intubation.
Tube feeds held today. 
Fluids are being given cautiously due to ECHO showing LVEF of 30%. 
 
Neurology:
 
Unresponsiveness - likely multifactorial at this point. Initially secondary to 
severe hypoglycemia. Patient likely had a seizure prior to arrival as she was 
found to have urinary and bladder incontinence and had an elevated prolactin 
level on admission. Patient was also found to have vomitted and aspirated. In 
the ED on day of admision patient had a cardiac arrest for approximately 10 
minutes. Patient had a head CT on admission which was nondiagnostic due to 
motion artifact. Repeat CT on 3/21 showed extensive loss of gray-white matter 
differentiation primarily involving the basal ganglia, right insular cortex, and
both temporal lobes. These findings are consistent with hypoxic ischemic injury.
No acute hemorrhage.
- neurology is on board. appreciate recommendations 
- continue neurochecks
- continue to watch off sedation 
- continue aspiration protocol 
- continue aspirin 
- continue to monitor and replete electrolytes 
 
Nephrology:
 
ESRD w/left sided AV fistula: 
Patient has a history of ESRD with biopsy showing chronic intersitial nephritis 
and diabetic nephropathy. Patient was placed on a course of steroids which were 
tapered off. Patient has been evaluated by nephrology today. IV Bicarb was 
discontinued. Patient is currently being dialyzed today. 
- patient is to go for dialysis again tomorrow. 
- continue to monitor electrolytes 
- continue to monitor I/O 
- nephrology consulted. appreciate recommendations 
 
 
Urology: 
Urethral stricture. Nursing was unable to placed pérez. Urology was consulted 
and placed a 16 [place] pérez after urethral dilation. Patient's pérez appears to
be obstructed today with what appears to be clotted frothy blood in the bag and 
area around the pérez with bleeding and showing leakage. Irrigation on 3/20 did 
not improve the patency of the pérez. The pérez was removed in the morning on 3/
21 by urology. Patient's PVR >500 on 3/21 and Dr. Garvin attempted to place a
pérez at the bedside however was unable to. Patient was taken to the OR today 
for cystoscopy with successful pérez placement. Cystoscopy showed false urethral
passage. Patient drained 800cc immediateley after pérez placement. 
- continue to monitor output 
- continue to monitor for excessive bleeding
 
 
DVT PPx: 
IV heparin discontinued due to bleeding, ALPs only 
 
Code: 
Full code 
Problem List:
 1. Hypoxic brain injury
 
 2. NSTEMI (non-ST elevated myocardial infarction)
 
 3. End stage renal disease
 
 4. Aspiration pneumonia due to gastric secretions
 
Pain Ratin
Tomorrow's Labs & Rationales:
cbc - anemia 
bep - hypernatremia, esrd 
 
Plan
DVT/Prophylaxis: mechanical, pharmacological

## 2018-03-23 VITALS — DIASTOLIC BLOOD PRESSURE: 70 MMHG | SYSTOLIC BLOOD PRESSURE: 188 MMHG

## 2018-03-23 VITALS — DIASTOLIC BLOOD PRESSURE: 100 MMHG | SYSTOLIC BLOOD PRESSURE: 204 MMHG

## 2018-03-23 VITALS — SYSTOLIC BLOOD PRESSURE: 154 MMHG | DIASTOLIC BLOOD PRESSURE: 82 MMHG

## 2018-03-23 LAB
ABSOLUTE GRANULOCYTE CT: 7.9 /CUMM (ref 1.4–6.5)
BASOPHILS # BLD: 0 /CUMM (ref 0–0.2)
BASOPHILS NFR BLD: 0.2 % (ref 0–2)
EOSINOPHIL # BLD: 0 /CUMM (ref 0–0.7)
EOSINOPHIL NFR BLD: 0.1 % (ref 0–5)
ERYTHROCYTE [DISTWIDTH] IN BLOOD BY AUTOMATED COUNT: 17.8 % (ref 11.5–14.5)
GRANULOCYTES NFR BLD: 73.1 % (ref 42.2–75.2)
HCT VFR BLD CALC: 28 % (ref 37–47)
LYMPHOCYTES # BLD: 2.2 /CUMM (ref 1.2–3.4)
MCH RBC QN AUTO: 27.7 PG (ref 27–31)
MCHC RBC AUTO-ENTMCNC: 33.6 G/DL (ref 33–37)
MCV RBC AUTO: 82.6 FL (ref 81–99)
MONOCYTES # BLD: 0.7 /CUMM (ref 0.1–0.6)
PLATELET # BLD: 265 /CUMM (ref 130–400)
PMV BLD AUTO: 7.3 FL (ref 7.4–10.4)
RED BLOOD CELL CT: 3.39 /CUMM (ref 4.2–5.4)
WBC # BLD AUTO: 10.8 /CUMM (ref 4.8–10.8)

## 2018-03-23 NOTE — PN- NEPHROLOGY
Assessment/Plan Nephrology
Assessment:
ESRD.  On 2nd dialysis Rx. today.  Still comatose.   Will plan 2.5 L UF with HD 
today and 3.5 hr Rx.  Will dialyze again tomorrow then Monday.   Her coma is not
related to her ESRD so dialysis would not be expected to improve her neurologic 
status.  Her BIANCA superimposed on ESRD, however, would complicate management (
fluids/electrolytes/superimposed uremia) over the next few days so dialysis was 
started yesterday to stabilize this aspect of her care and allow more time for 
recovery on her own.  Will monitor dialysis closely. Will need low dose heparin 
for dialysis (about 3859-3043 units/Rx).
 
Juan Denis MD
 
 
ADDENDUM:   10:00  AM  AVF functioning well at . will continue same. Okay 
to cancel JONATHAN. Will try to continue to use AVF>
 
Luis Fernando Denis MD
Suggestion:
.
 
 
Subjective
Subjective:
Pt on dialysis now.  Problems with HD yesterday due to small AVF.  Needled today
without problem (AVF is under the upper arm and will likely need 16-17 g needles
for a while). 
 
Objective
Vital Signs and I&Os
F intubated unresponsive
154/82   90  98.9
Lungs clear
Cor RRR
Abd soft
Ext neg edema
 
 
 
Results
Pertinent Lab Results:
 
147 / 107  / 79 /  214
4.1   / 22  / 7.0\
 
Hg 9.4

## 2018-03-23 NOTE — PN- INFECT DX
Subjective
Subjective:
Afebrile on steroids.  She is currently undergoing dialysis.
 
Objective
Last 24 Hrs of Vital Signs/I&O
 Vital Signs
 
 
Date Time Temp Pulse Resp B/P B/P Pulse O2 O2 Flow FiO2
 
     Mean Ox Delivery Rate 
 
03/23 0856         30
 
03/23 0543         30
 
03/23 0400      97 Ventilator 30% 
 
03/23 0349         30
 
03/23 0043         30
 
03/23 0000 98.9 90 20 154/82  97 Ventilator 30% 
 
03/23 0000      97 Ventilator 30% 
 
03/22 2238         30
 
03/22 2000      98 Ventilator 30% 
 
03/22 1925         30
 
03/22 1657         30
 
03/22 1600      96 Ventilator 30% 
 
03/22 1600 97.0 72 20 147/63  96 Ventilator 30% 
 
03/22 1423         30
 
03/22 1200      8 Ventilator 30% 
 
03/22 1124         30
 
 
 Intake & Output
 
 
 03/23 1600 03/23 0800 03/23 0000
 
Intake Total   120
 
Output Total  150 300
 
Balance  -150 -180
 
    
 
Intake, IV   120
 
Output, Urine  150 300
 
 
 
 
Physical Exam
Other Physical Findings:
She remains unresponsive off sedation on the ventilator with occasional 
nonpurposeful movements
Lungs are clear
Heart regular rhythm with no murmur
Abdomen is soft, with positive bowel sounds
Extremities no cyanosis, clubbing or edema
 Jose catheter remains in place
 
 
Results
Last 24 Hours of Lab Results:
 Laboratory Tests
 
 
 03/23 03/22 0405 2010
 
Chemistry  
 
  Sodium (137 - 145 mmol/L) 147  H 
 
  Potassium (3.5 - 5.1 mmol/L) 4.1 
 
  Chloride (98 - 107 mmol/L) 107 
 
  Carbon Dioxide (22 - 30 mmol/L) 22 
 
  Anion Gap (5 - 16) 18  H 
 
  BUN (7 - 17 mg/dL) 79  H 
 
  Creatinine (0.5 - 1.0 mg/dL) 7.0 *H 
 
  Estimated GFR (>60 ml/min) 6  L 
 
  Glucose (65 - 99 mg/dL) 214  H 
 
  Calcium (8.4 - 10.2 mg/dL) 9.1 
 
  Phosphorus (2.5 - 4.5 mg/dL) 6.7  H 
 
  Magnesium (1.6 - 2.3 mg/dL) 2.1 
 
  Total Bilirubin (0.2 - 1.3 mg/dL) 0.9 
 
  AST (14 - 36 U/L) 41  H 
 
  ALT (9 - 52 U/L) 54  H 
 
  Albumin (3.5 - 5.0 g/dL) 2.4  L 
 
Hematology  
 
  CBC w Diff NO MAN DIFF REQ NO MAN DIFF REQ
 
  WBC (4.8 - 10.8 /CUMM) 10.8 13.1  H
 
  RBC (4.20 - 5.40 /CUMM) 3.39  L 3.39  L
 
  Hgb (12.0 - 16.0 G/DL) 9.4  L 9.2  L
 
  Hct (37 - 47 %) 28.0  L 28.6  L
 
  MCV (81.0 - 99.0 FL) 82.6 84.2
 
  MCH (27.0 - 31.0 PG) 27.7 27.1
 
  MCHC (33.0 - 37.0 G/DL) 33.6 32.2  L
 
  RDW (11.5 - 14.5 %) 17.8  H 17.6  H
 
  Plt Count (130 - 400 /CUMM) 265 281
 
  MPV (7.4 - 10.4 FL) 7.3  L 7.6
 
  Gran % (42.2 - 75.2 %) 73.1 85.3  H
 
  Lymphocytes % (20.5 - 51.1 %) 20.3  L 11.0  L
 
  Monocytes % (1.7 - 9.3 %) 6.3 3.5
 
  Eosinophils % (0 - 5 %) 0.1 0
 
  Basophils % (0.0 - 2.0 %) 0.2 0.2
 
  Absolute Granulocytes (1.4 - 6.5 /CUMM) 7.9  H 11.1  H
 
  Absolute Lymphocytes (1.2 - 3.4 /CUMM) 2.2 1.4
 
  Absolute Monocytes (0.10 - 0.60 /CUMM) 0.7  H 0.5
 
  Absolute Eosinophils (0.0 - 0.7 /CUMM) 0 0
 
  Absolute Basophils (0.0 - 0.2 /CUMM) 0 0
 
 
 
Last 24 Hours of Sid Results:
Blood cultures March 19/March 20 negative
 
Recent Imaging Studies:
Chest x-ray March 23 reveals a persistent right upper lobe density
 
 
Assessment/Plan ID
Impression:
Condition remains poor, remaining unresponsive status post a cardiac arrest on 
admission, with evidence of hypoxic ischemic injury on her recent CT of the 
head.  She was begun on dialysis yesterday and is again being dialyzed today.  
She remains afebrile (on steroids for interstitial nephritis), with her white 
blood cell count now normal on Unasyn Day 5 for possible aspiration pneumonia, 
with her recent chest x-rays only revealing the chronic right upper lobe density
that was found on biopsy 4 months prior to admission to be nondiagnostic.  
 
Suggestion:
1.  Remove right femoral triple lumen catheter
2.  Further management with regard to her neurologic status and renal failure 
per ICU team
3.  Discontinue Unasyn and follow off antibiotics

## 2018-03-23 NOTE — RADIOLOGY REPORT
EXAMINATION:
XR PORTABLE CHEST
 
CLINICAL INFORMATION:
Intubation
 
COMPARISON:
Chest x-ray March 22, 2018
 
TECHNIQUE:
Portable frontal view of the chest was obtained. 6:04 AM
 
FINDINGS:
Endotracheal tube about 6 cm above kaushal.
Nasogastric tube tip in the stomach but the sidehole is at the level the
diaphragm.
There is a persistent small parenchymal opacity, infiltrate, at the right
upper lobe.
Left lung clear.
No pleural effusion. Cardiac mediastinal contour is unchanged.
 
IMPRESSION:
 
1. Endotracheal tube 6 cm above kaushal.
2. Nasogastric tube tip in stomach.
3. Persistent right upper lobe patchy airspace opacity.

## 2018-03-23 NOTE — PN- RESIDENT CRCU
Rayo BARLOW,Tono 18 0733:
Subjective
HPI/CRCU Issues:
Patient continues to remain unresponsive with occasional spontaneous leg 
movements and eye twitching. 
 
Patient had dialysis yesterday. There was some difficulty with patient's fistula
however nephrology reports that it will be used again today.
 
 
Objective
 
Vital Signs & I&O
Last 8 Hrs of Vitals and I&O:
 Laboratory Tests
 
 
 
 
Chemistry  
 
  Sodium (137 - 145 mmol/L) 147  H 
 
  Potassium (3.5 - 5.1 mmol/L) 4.1 
 
  Chloride (98 - 107 mmol/L) 107 
 
  Carbon Dioxide (22 - 30 mmol/L) 22 
 
  Anion Gap (5 - 16) 18  H 
 
  BUN (7 - 17 mg/dL) 79  H 
 
  Creatinine (0.5 - 1.0 mg/dL) 7.0 *H 
 
  Estimated GFR (>60 ml/min) 6  L 
 
  Glucose (65 - 99 mg/dL) 214  H 
 
  Calcium (8.4 - 10.2 mg/dL) 9.1 
 
  Phosphorus (2.5 - 4.5 mg/dL) 6.7  H 
 
  Magnesium (1.6 - 2.3 mg/dL) 2.1 
 
  Total Bilirubin (0.2 - 1.3 mg/dL) 0.9 
 
  AST (14 - 36 U/L) 41  H 
 
  ALT (9 - 52 U/L) 54  H 
 
  Albumin (3.5 - 5.0 g/dL) 2.4  L 
 
Hematology  
 
  CBC w Diff NO MAN DIFF REQ NO MAN DIFF REQ
 
  WBC (4.8 - 10.8 /CUMM) 10.8 13.1  H
 
  RBC (4.20 - 5.40 /CUMM) 3.39  L 3.39  L
 
  Hgb (12.0 - 16.0 G/DL) 9.4  L 9.2  L
 
  Hct (37 - 47 %) 28.0  L 28.6  L
 
  MCV (81.0 - 99.0 FL) 82.6 84.2
 
  MCH (27.0 - 31.0 PG) 27.7 27.1
 
  MCHC (33.0 - 37.0 G/DL) 33.6 32.2  L
 
  RDW (11.5 - 14.5 %) 17.8  H 17.6  H
 
  Plt Count (130 - 400 /CUMM) 265 281
 
  MPV (7.4 - 10.4 FL) 7.3  L 7.6
 
  Gran % (42.2 - 75.2 %) 73.1 85.3  H
 
  Lymphocytes % (20.5 - 51.1 %) 20.3  L 11.0  L
 
  Monocytes % (1.7 - 9.3 %) 6.3 3.5
 
  Eosinophils % (0 - 5 %) 0.1 0
 
  Basophils % (0.0 - 2.0 %) 0.2 0.2
 
  Absolute Granulocytes (1.4 - 6.5 /CUMM) 7.9  H 11.1  H
 
  Absolute Lymphocytes (1.2 - 3.4 /CUMM) 2.2 1.4
 
  Absolute Monocytes (0.10 - 0.60 /CUMM) 0.7  H 0.5
 
  Absolute Eosinophils (0.0 - 0.7 /CUMM) 0 0
 
  Absolute Basophils (0.0 - 0.2 /CUMM) 0 0
 
 
 
 
Exam
General Appearance: well developed/nourished, intubated, unresponsive
Respiratory: normal breath sounds, lungs clear (clear on anterior auscultation)
Cardiovascular: regular rate/rhythm
Gastrointestinal: normal bowel sounds, soft
Extremities: normal inspection, no edema
Cranial Nerves: PERRL, limited neuro exam due to unresponsiveness, + babinski 
sign
Current Medications:
 Current Medications
 
 
  Sig/Susan Start time  Last
 
Medication Dose Route Stop Time Status Admin
 
Acetaminophen 1,000 MG Q6P PRN  1445 AC 
 
  IV   0551
 
Amlodipine Besylate 5 MG DAILY  1000 AC 
 
  PO   
 
Amlodipine Besylate 2.5 MG ONCE ONE  1600 DC 
 
  PO  1601  1500
 
Amlodipine Besylate 2.5 MG DAILY  1211 DC 
 
  PO   1312
 
Ampicillin Sodium/ 3,000 MG Q12  1730 AC 
 
Sulbactam Sodium  IV   1200
 
Sodium Chloride 100 ML    
 
Artificial Tears 2 GTT 4 TIMES/DAY  1400 AC 
 
  OPH   1641
 
Aspirin 81 MG DAILY  1000 AC 
 
  PO   1223
 
Atorvastatin Calcium 40 MG 1700  1700 AC 
 
  PO   1641
 
Hydralazine HCl 5 MG ONCE ONE  1630 DC 
 
  IV  1631  1642
 
Hydrocortisone  12.5 MG BID  1000 AC 
 
Sodium Succinate  IV   1222
 
Hydrocortisone  25 MG BID  1000 DC 
 
Sodium Succinate  IV   2141
 
Insulin Aspart 0 Q4H  1200 AC 
 
  SC   1641
 
Insulin Aspart 0 Q4  2200 DC 
 
  SC   0615
 
Insulin Detemir 8 UNITS BID  1000 AC 
 
  SC   1221
 
Insulin Detemir 6 UNITS BID  2200 DC 
 
  SC   2212
 
Levothyroxine Sodium 25 MCG DAILY  1000 AC 
 
  IV   1222
 
Lorazepam 1 MG Q4P PRN  1400 AC 
 
  IV   
 
Pantoprazole Sodium 40 MG DAILY  1730 AC 
 
  IV   1222
 
Scopolamine HBr 1 PAT Q72H PRN  1600 AC 
 
  TOP   1640
 
 
 
 
Impression/Plan
 
Impression/Problem List
Impression:
This is 58-year-old female with a medical history of stage 5 CKD(nephrotic 
proteinuria, diabetic nephropathy and retinopathy) with left AV fistula placed 
couple month ago(still making urine), currently not on hemodialysis, has only 
one kidney since childhood.  Chronic anemia, hypertension, type 1 diabetes on 
insulin pump, hypothyroidism, ankylosing spondylitis on chronic prednisone, 
depression, GERD, right lung mass according to the family the biopsy came back 
as a benign, hyperparathyroidism due to renal insufficiency, chronic pain.  
Presented to the emergency department via EMS due to unresponsiveness.
 
 
Patient is admitted to the ICU for management of the following: 
 
Respiratory: 
 
Acute hypoxic respiratory failure 2/2 to aspiration pneumonia on mechanical 
ventilation: 
Patient continues to remain on mechanical ventilation. Oxygen requirement 
continues to improve with decreased FiO2. Patient's remains unresponsive off 
sedation.
 
- continue mechanical ventilation
- ativan 1mg q4h PRN for agitation 
- Repeat chest x-ray in a.m.
 
Infection: 
Aspiration Pneumonia: 
Patient continues to be tachycardic, febrile with elevated leukocytosis which is
downtrending on IV Unasyn.  No growth on cultures thus far.  Repeat chest xray 
shows persistent bilateral airspace opacities with largest area of consolidation
in the right upper lobe. Patient remains on ventilatory support. CT Chest 
showing lobulated mass in the right upper lobe medially is not significantly 
changed in size. There are now patchy areas of ground glass opacification 
nodularity in both lungs, which may be consistent with multifocal pneumonic 
infiltrates.
 
- will complete a 5 day course of antibiotics today. Will discontinue 
antibiotics today. 
- continue to monitor CBCs, vitals
- Follow-up blood cultures, sputum culture
- ID on board.  Appreciate recommendations.
 
Cardiac:
Status post cardiac arrest in the ED
New right bundle branch block, wide QRS tachycardia
NSTEMI- likely 2/2 to demand ischemia
Patient's troponin's peaked to 4.20. Patient remains unresponsive on mechanical 
ventilator. Patient's heparin was temporarily stopped on 3/20 due to concerns of
bleeding from pérez site. Patient was seen by cardiology. H/H was stable and 
patient's heparin was shortly after on 3/20.
- Off IV heparin. Patient's H/H stable but continues to have guiac positive 
stools. 
- continue aspirin and statin
 
Heme: 
 
Anemia
Patient has a history of chronic anemia. H/H dropped to 7.6 this morning. 
Patient had pérez placement in the OR today with continued bleeding which is 
expected per urology. Patient is also having guiac positive stools. Repeat H/H 
after was 7.1. Patient s/p 1 unit pRBC with dialysis on 3/22. Repeat H/H was >9 
this morning.  
 
- continue to mmonitor H/H
- continue to monitor for signs of bleeding 
 
Metabolic: 
 
Type 1 Diabetes - on Insulin Pump
Patient initially presented with hypoglycemia w/ BG of 27. Patients BG over the 
past 24 hours was up to the 400s today. Levemir was increased. Hydrocortisone 
was decreased today.  
- Endocrinology on board. Appreciate recommendations 
- Continue levemir to 8 units BID 
- Insulin SS adjusted per endocrinology recommendations 
- Target BG per endocrinology: FSG of 100 to 200. 
 
Adrenal Insufficiency. Patient is on chronic prednisone for interstitital 
nephritis.
- IV hydrocortisone decreased to 12.5mg IV BID 
 
Metabolic acidosis with elevated anion gap and lactic acidosis - Resolved
Patient's anion gap is down from previous day. Lactic acid was previously 
fluctuating however is now downtrending. Patient was previously getting IV 
Bicarb. 
 
Alimentary:
 
Patient is currently NPO due to intubation.
Resume tube feeds today. 
Fluids are being given cautiously due to ECHO showing LVEF of 30%. 
 
Neurology:
 
Unresponsiveness - likely multifactorial at this point. Initially secondary to 
severe hypoglycemia. Patient likely had a seizure prior to arrival as she was 
found to have urinary and bladder incontinence and had an elevated prolactin 
level on admission. Patient was also found to have vomitted and aspirated. In 
the ED on day of admision patient had a cardiac arrest for approximately 10 
minutes. Patient had a head CT on admission which was nondiagnostic due to 
motion artifact. Repeat CT on 3/21 showed extensive loss of gray-white matter 
differentiation primarily involving the basal ganglia, right insular cortex, and
both temporal lobes. These findings are consistent with hypoxic ischemic injury.
No acute hemorrhage.
- neurology is on board. appreciate recommendations 
- continue neurochecks
- continue to watch off sedation 
- continue aspiration protocol 
- continue aspirin 
- continue to monitor and replete electrolytes 
 
Nephrology:
 
ESRD w/left sided AV fistula: 
Patient has a history of ESRD with biopsy showing chronic intersitial nephritis 
and diabetic nephropathy. Patient was placed on a course of steroids which were 
tapered off. Patient has been evaluated by nephrology today. IV Bicarb was 
discontinued. Patient is currently being dialyzed today. 
- patient is to go for dialysis again tomorrow. 
- continue to monitor electrolytes 
- continue to monitor I/O 
- nephrology consulted. appreciate recommendations 
 
 
Urology: 
Urethral stricture. Nursing was unable to placed pérez. Urology was consulted 
and placed a 16 [place]ian pérez after urethral dilation. Patient's pérez appears to
be obstructed today with what appears to be clotted frothy blood in the bag and 
area around the pérez with bleeding and showing leakage. Irrigation on 3/20 did 
not improve the patency of the pérez. The pérez was removed in the morning on 3/
21 by urology. Patient's PVR >500 on 3/21 and Dr. Garvin attempted to place a
pérez at the bedside however was unable to. Patient was taken to the OR on 3/22 
for cystoscopy with successful pérez placement. Cystoscopy showed false urethral
passage. Patient drained 800cc immediateley after pérez placement. 
- continue to monitor output 
- continue to monitor for excessive bleeding
- urology on board
 
DVT PPx: 
ALPs only 
 
Code: 
Full code 
Problem List:
 1. Hypoxic brain injury
 
 2. NSTEMI (non-ST elevated myocardial infarction)
 
 3. End stage renal disease
 
Pain Ratin
Tomorrow's Labs & Rationales:
cbc
bep 
 
 
Plan
DVT/Prophylaxis: mechanical, pharmacological
 
 
Mike Haddad MD 18 1214:
Attending MD Review Statement
 
Attending Sign Off
Attending Cosign Statement:
I have: examined this patient, reviewed avalbl EMR data, personally reviewd 
images, discussd w/resident/PA/NP, discussed mgmt plan w/josé, discussed mgmt 
plan w/CM, discussed mgmt plan w/pt, agreed w/resident/PA/NP, amended to note. 
Other Findings:
Impression
58 year old woman
 
 s/p asystolic arrest, likely/presumed secondary to insulin overdose resulting 
in hypolglycemia
 CKD
 unresponsiveness
 hypoxemic respiratory failure
 lung nodule s/p non diagnostic biopsy
 leukocytosis with likely aspiration
 
Plan
Respiratory
-mechanical ventilation to be continued
-lung nodule was non-diagnositic, ct chest without contrast today
 
ID
-f/u ID
 
[facility]
-monitor hemodynamics
-f/u cardiology recs
 
Heme
-f/u cbc, coags
 
Metabolic
-f/u endocrine/renal recs
-monitor ins/outs
-monitor electrolytes
-HD per nephrology
 
Alimentary
-tube feeds
-finger sticks
 
Neuro
-neurology appreciated
-ct head today
 
DVT prophylaxis at all times
 
TTS 40 min
 
Family at bedside, discussed system based problem list and treatment plan

## 2018-03-23 NOTE — PN- DIABETES
Assessment/Plan Diabetes
Assessment:
57 y/o female hx of longstanding DM type 1, chronic renal disease due to 
diabetes and chronic interstitual nephritis and was treated with a couse of 
steroid. 
 
Patient was found to be unresponsive with glucose level was in the 20s. Blood 
work in ER showed inappropriately elevated insulin level.  Her severe 
hypoglycemia most likely was due to insulin overdose.
 
She received hydrocortisone 100 mg iv in ER. Patient was intubated. She was on 
D10 w and pressor. Her BP and glucose level improved and D10 w and pressor were 
discontinued.  She is now on hydrocortisone 25 mg iv twice a day.
 
According to her insulin pump-- [employer] 630 G-- her basal insulin 36.75 units 
per 24 hours. 
 
She was on Levemir 16 units twice a day and Novolog coveage every 4 hours 
initially. Levemir was gradually decreased to 6 units twice a day; Novolog 
coverage every 4 hours was adjusted. Her FSGs over past 24 hours were 429, 328, 
193 and 205.
 
In addition, she was put on Levothyroxine 25 mcg iv daily.
 
She is receiving receive HD at this moment.
 
Plan:
1. decrease Hydrocortisone to 12.5 mg iv twice  a day today;
2. increase Levemir to 8 units twice a day;
3. continue the current Novolog coverage every 4 hours;
4. monitor FSGs, electrolytes and vital signs.
will follow.
 
Inpatient Diabetes Orders
Before Each Meal:
   Bolus Insulin: . icr
 
Subjective
Subjective:
patient remains intubated.
 
Objective
Last 24 Hrs of Vital Signs/I&O
 Vital Signs
 
 
Date Time Temp Pulse Resp B/P B/P Pulse O2 O2 Flow FiO2
 
     Mean Ox Delivery Rate 
 
03/23 0856         30
 
03/23 0543         30
 
03/23 0400      97 Ventilator 30% 
 
03/23 0349         30
 
03/23 0043         30
 
03/23 0000 98.9 90 20 154/82  97 Ventilator 30% 
 
03/23 0000      97 Ventilator 30% 
 
03/22 2238         30
 
03/22 2000      98 Ventilator 30% 
 
03/22 1925         30
 
03/22 1657         30
 
03/22 1600      96 Ventilator 30% 
 
03/22 1600 97.0 72 20 147/63  96 Ventilator 30% 
 
03/22 1423         30
 
03/22 1200      8 Ventilator 30% 
 
03/22 1124         30
 
 
 Intake & Output
 
 
 03/23 1600 03/23 0800 03/23 0000
 
Intake Total   120
 
Output Total  150 300
 
Balance  -150 -180
 
    
 
Intake, IV   120
 
Output, Urine  150 300
 
 
 
 
Findings
Pertinent Lab/Sid Results:
 Laboratory Tests
 
 
 03/23 03/22 0405 2010
 
Chemistry  
 
  Sodium (137 - 145 mmol/L) 147  H 
 
  Potassium (3.5 - 5.1 mmol/L) 4.1 
 
  Chloride (98 - 107 mmol/L) 107 
 
  Carbon Dioxide (22 - 30 mmol/L) 22 
 
  Anion Gap (5 - 16) 18  H 
 
  BUN (7 - 17 mg/dL) 79  H 
 
  Creatinine (0.5 - 1.0 mg/dL) 7.0 *H 
 
  Estimated GFR (>60 ml/min) 6  L 
 
  Glucose (65 - 99 mg/dL) 214  H 
 
  Calcium (8.4 - 10.2 mg/dL) 9.1 
 
  Phosphorus (2.5 - 4.5 mg/dL) 6.7  H 
 
  Magnesium (1.6 - 2.3 mg/dL) 2.1 
 
  Total Bilirubin (0.2 - 1.3 mg/dL) 0.9 
 
  AST (14 - 36 U/L) 41  H 
 
  ALT (9 - 52 U/L) 54  H 
 
  Albumin (3.5 - 5.0 g/dL) 2.4  L 
 
Hematology  
 
  CBC w Diff NO MAN DIFF REQ NO MAN DIFF REQ
 
  WBC (4.8 - 10.8 /CUMM) 10.8 13.1  H
 
  RBC (4.20 - 5.40 /CUMM) 3.39  L 3.39  L
 
  Hgb (12.0 - 16.0 G/DL) 9.4  L 9.2  L
 
  Hct (37 - 47 %) 28.0  L 28.6  L
 
  MCV (81.0 - 99.0 FL) 82.6 84.2
 
  MCH (27.0 - 31.0 PG) 27.7 27.1
 
  MCHC (33.0 - 37.0 G/DL) 33.6 32.2  L
 
  RDW (11.5 - 14.5 %) 17.8  H 17.6  H
 
  Plt Count (130 - 400 /CUMM) 265 281
 
  MPV (7.4 - 10.4 FL) 7.3  L 7.6
 
  Gran % (42.2 - 75.2 %) 73.1 85.3  H
 
  Lymphocytes % (20.5 - 51.1 %) 20.3  L 11.0  L
 
  Monocytes % (1.7 - 9.3 %) 6.3 3.5
 
  Eosinophils % (0 - 5 %) 0.1 0
 
  Basophils % (0.0 - 2.0 %) 0.2 0.2
 
  Absolute Granulocytes (1.4 - 6.5 /CUMM) 7.9  H 11.1  H
 
  Absolute Lymphocytes (1.2 - 3.4 /CUMM) 2.2 1.4
 
  Absolute Monocytes (0.10 - 0.60 /CUMM) 0.7  H 0.5
 
  Absolute Eosinophils (0.0 - 0.7 /CUMM) 0 0
 
  Absolute Basophils (0.0 - 0.2 /CUMM) 0 0
 
 
 
 
 03/22
 
 1000
 
Hematology 
 
  CBC w Diff NO MAN DIFF REQ
 
  WBC (4.8 - 10.8 /CUMM) 11.3  H
 
  RBC (4.20 - 5.40 /CUMM) 2.72  L
 
  Hgb (12.0 - 16.0 G/DL) 7.1 *L
 
  Hct (37 - 47 %) 22.2  L
 
  MCV (81.0 - 99.0 FL) 81.6
 
  MCH (27.0 - 31.0 PG) 26.2  L
 
  MCHC (33.0 - 37.0 G/DL) 32.1  L
 
  RDW (11.5 - 14.5 %) 18.5  H
 
  Plt Count (130 - 400 /CUMM) 280
 
  MPV (7.4 - 10.4 FL) 7.3  L
 
  Gran % (42.2 - 75.2 %) 77.2  H
 
  Lymphocytes % (20.5 - 51.1 %) 17.5  L
 
  Monocytes % (1.7 - 9.3 %) 5.0
 
  Eosinophils % (0 - 5 %) 0.1
 
  Basophils % (0.0 - 2.0 %) 0.2
 
  Absolute Granulocytes (1.4 - 6.5 /CUMM) 8.7  H
 
  Absolute Lymphocytes (1.2 - 3.4 /CUMM) 2.0
 
  Absolute Monocytes (0.10 - 0.60 /CUMM) 0.6
 
  Absolute Eosinophils (0.0 - 0.7 /CUMM) 0
 
  Absolute Basophils (0.0 - 0.2 /CUMM) 0

## 2018-03-24 VITALS — SYSTOLIC BLOOD PRESSURE: 142 MMHG | DIASTOLIC BLOOD PRESSURE: 74 MMHG

## 2018-03-24 VITALS — SYSTOLIC BLOOD PRESSURE: 132 MMHG | DIASTOLIC BLOOD PRESSURE: 70 MMHG

## 2018-03-24 VITALS — SYSTOLIC BLOOD PRESSURE: 161 MMHG | DIASTOLIC BLOOD PRESSURE: 73 MMHG

## 2018-03-24 LAB
ABSOLUTE GRANULOCYTE CT: 13.6 /CUMM (ref 1.4–6.5)
BASOPHILS # BLD: 0 /CUMM (ref 0–0.2)
BASOPHILS NFR BLD: 0.1 % (ref 0–2)
EOSINOPHIL # BLD: 0 /CUMM (ref 0–0.7)
EOSINOPHIL NFR BLD: 0.3 % (ref 0–5)
ERYTHROCYTE [DISTWIDTH] IN BLOOD BY AUTOMATED COUNT: 17.9 % (ref 11.5–14.5)
GRANULOCYTES NFR BLD: 79.5 % (ref 42.2–75.2)
HCT VFR BLD CALC: 30.8 % (ref 37–47)
LYMPHOCYTES # BLD: 2.4 /CUMM (ref 1.2–3.4)
MCH RBC QN AUTO: 27.7 PG (ref 27–31)
MCHC RBC AUTO-ENTMCNC: 33.1 G/DL (ref 33–37)
MCV RBC AUTO: 83.6 FL (ref 81–99)
MONOCYTES # BLD: 1 /CUMM (ref 0.1–0.6)
PLATELET # BLD: 245 /CUMM (ref 130–400)
PMV BLD AUTO: 8.1 FL (ref 7.4–10.4)
RED BLOOD CELL CT: 3.69 /CUMM (ref 4.2–5.4)
WBC # BLD AUTO: 17.1 /CUMM (ref 4.8–10.8)

## 2018-03-24 NOTE — PN- CARDIOLOGY
Subjective
Subjective:
The patient remains unresponsive and intubated on the respirator.  Her blood 
pressure has come down on her current regimen.  She is currently in the 160s 
systolic.  She is being dialyzed today so hasn't gotten her medication yet this 
morning but she is stable.
 
Objective
Vital Signs and I&Os
Vital Signs
 
 
Date Time Temp Pulse Resp B/P B/P Pulse O2 O2 Flow FiO2
 
     Mean Ox Delivery Rate 
 
03/24 0800 99.4 90 20 142/74  97 Ventilator 30% 
 
03/24 0800      97 Ventilator 30% 
 
03/24 0755         30
 
03/24 0535         30
 
03/24 0400      97 Ventilator 30% 
 
03/24 0305         30
 
03/24 0046         30
 
03/24 0000      97 Ventilator 30% 
 
03/24 0000 97.3 87 20 161/73  97 Ventilator 30% 
 
03/23 2231         30
 
03/23 2056         30
 
03/23 2048      97 Ventilator 30% 
 
03/23 1848  92  186/90     
 
03/23 1652         30
 
03/23 1642  80  200/90     
 
03/23 1600      96 Ventilator 30% 
 
03/23 1600 98.3 78 20 204/100  98 Ventilator 30% 
 
03/23 1500  70  200/100     
 
03/23 1433         30
 
03/23 1312  86  202/81     
 
03/23 1200      98 Ventilator 30% 
 
 
 Intake & Output
 
 
 03/24 1600 03/24 0800 03/24 0000 03/23 1600 03/23 0800 03/23 0000
 
Intake Total  160 80 100  120
 
Output Total  90 112 270 150 300
 
Balance  70 -32 -170 -150 -180
 
       
 
Intake, IV    100  120
 
Intake, Tube  100 50   
 
Feeding      
 
Intake, Tube  60 30   
 
Irrigant      
 
Output,    100  
 
Gastric      
 
Drainage      
 
Output, Urine  90 112 170 150 300
 
 
 
Physical Exam:
She was febrile She is unresponsive
HEENT exam grossly normal
Chest aerating well
Heart regular rhythm no murmurs
Extremities no edema
Current Medications:
 Current Medications
 
 
  Sig/Susan Start time  Last
 
Medication Dose Route Stop Time Status Admin
 
Acetaminophen 1,000 MG Q6P PRN 03/19 1445 AC 03/20
 
  IV   0551
 
Amlodipine Besylate 5 MG DAILY 03/24 1000 DC 
 
  PO   
 
Amlodipine Besylate 10 MG DAILY 03/24 1000 AC 
 
  PO   
 
Amlodipine Besylate 2.5 MG ONCE ONE 03/23 1600 DC 03/23
 
  PO 03/23 1601  1500
 
Amlodipine Besylate 2.5 MG DAILY 03/23 1211 DC 03/23
 
  PO   1312
 
Ampicillin Sodium/ 3,000 MG Q12 03/19 1730 DC 03/23
 
Sulbactam Sodium  IV   1200
 
Sodium Chloride 100 ML    
 
Artificial Tears 2 GTT 4 TIMES/DAY 03/23 1400 AC 03/23
 
  OPH   2156
 
Aspirin 81 MG DAILY 03/21 1000 AC 03/23
 
  PO   1223
 
Atorvastatin Calcium 40 MG 1700 03/21 1700 AC 03/23
 
  PO   1641
 
Hydralazine HCl 10 MG TID 03/24 1000 AC 
 
  IV   
 
Hydralazine HCl 5 MG ONCE ONE 03/23 1830 DC 03/23
 
  IV 03/23 1831  1848
 
Hydralazine HCl 5 MG ONCE ONE 03/23 1630 DC 03/23
 
  IV 03/23 1631  1642
 
Hydrocortisone  12.5 MG BID 03/23 1000 AC 03/24
 
Sodium Succinate  IV   0945
 
Insulin Aspart 0 Q4H 03/23 1200 AC 03/24
 
  SC   0849
 
Insulin Aspart 0 Q4 03/19 2200 DC 03/23
 
  SC   0615
 
Insulin Detemir 8 UNITS BID 03/23 1000 AC 03/24
 
  SC   0945
 
Levothyroxine Sodium 25 MCG DAILY 03/20 1000 AC 03/23
 
  IV   1222
 
Lorazepam 1 MG Q4P PRN 03/20 1400 AC 
 
  IV   
 
Nitroglycerin 0.5 GM Q6 03/24 1200 AC 
 
  TOP   
 
Pantoprazole Sodium 40 MG DAILY 03/19 1730 AC 03/24
 
  IV   0945
 
Scopolamine HBr 1 PAT Q72H PRN 03/20 1600 AC 03/20
 
  TOP   1640
 
 
 
 
Results
Last 48 Hrs of Labs/Mics:
 Laboratory Tests
 
03/23/18 0405:
Anion Gap 18  H, Estimated GFR 6  L, Glucose 214  H, Calcium 9.1, Phosphorus 6.7
 H, Magnesium 2.1, Total Bilirubin 0.9, AST 41  H, ALT 54  H, Albumin 2.4  L, 
CBC w Diff NO MAN DIFF REQ, RBC 3.39  L, MCV 82.6, MCH 27.7, MCHC 33.6, RDW 17.8
 H, MPV 7.3  L, Gran % 73.1, Lymphocytes % 20.3  L, Monocytes % 6.3, Eosinophils
% 0.1, Basophils % 0.2, Absolute Granulocytes 7.9  H, Absolute Lymphocytes 2.2, 
Absolute Monocytes 0.7  H, Absolute Eosinophils 0, Absolute Basophils 0
 
03/22/18 2010:
CBC w Diff NO MAN DIFF REQ, RBC 3.39  L, MCV 84.2, MCH 27.1, MCHC 32.2  L, RDW 
17.6  H, MPV 7.6, Gran % 85.3  H, Lymphocytes % 11.0  L, Monocytes % 3.5, 
Eosinophils % 0, Basophils % 0.2, Absolute Granulocytes 11.1  H, Absolute 
Lymphocytes 1.4, Absolute Monocytes 0.5, Absolute Eosinophils 0, Absolute 
Basophils 0
 
 
Assessment/Plan
Assessment/Plan
The patient's blood pressure is reasonable on her current regimen.  I recommend 
continuing to monitor her blood pressure closely.  We can give additional doses 
of IV hydralazine if the pressure spikesIs with her blood, otherwise continue 
giving it regularly.
Continue telemetry? Not applicable (unit)

## 2018-03-24 NOTE — PN- DIABETES
Assessment/Plan Diabetes
Assessment:
57 y/o female hx of longstanding DM type 1, chronic renal disease due to 
diabetes and chronic interstitual nephritis and was treated with a couse of 
steroid. 
 
Patient was found to be unresponsive with glucose level was in the 20s. Blood 
work in ER showed inappropriately elevated insulin level.  Her severe 
hypoglycemia most likely was due to insulin overdose.
 
She received hydrocortisone 100 mg iv in ER. Patient was intubated. She was on 
D10 w and pressor. Her BP and glucose level improved and D10 w and pressor were 
discontinued.  She is now on hydrocortisone 12.5 mg iv twice a day. In addition,
she was put on Levothyroxine 25 mcg iv daily.
 
According to her insulin pump-- [employer] 630 G-- her basal insulin 36.75 units 
per 24 hours. 
 
She was on Levemir 16 units twice a day and Novolog coveage every 4 hours 
initially. Currently she is on Levemir 8 units twice a day; Novolog coverage 
every 4 hours. She was put on tube feeding at 20 ml/hour.  Her FSGs were 199, 
222, 208 and 232.
 
 
Plan:
1. increase Levemir to 11 units twice a day;
2. adjust Novolog coverage every 4 hours--detail see the inpatient DM order;
3. monitor FSGs;
4. continue hydrocortisone 12.5 mg iv twice a day for now;
will follow.
 
Inpatient Diabetes Orders
Every 4 Hours:
   Bolus Insulin: Novolog
   < 80 mg/dl: no coverage
    mg/dl: no coverage
   101-120 mg/dl: 2 units
   121-150 mg/dl: 2 units
   151-200 mg/dl: 4 units
   201-250 mg/dl: 6 units
   251-300 mg/dl: 8 units
   301-350 mg/dl: 10 units
   351-400 mg/dl: 12 units
   > 400 mg/dl: 14 units
 
Subjective
Subjective:
She remains intubated.
 
Objective
Last 24 Hrs of Vital Signs/I&O
 Vital Signs
 
 
Date Time Temp Pulse Resp B/P B/P Pulse O2 O2 Flow FiO2
 
     Mean Ox Delivery Rate 
 
03/24 1104         30
 
03/24 0800 99.4 90 20 142/74  97 Ventilator 30% 
 
03/24 0800      97 Ventilator 30% 
 
03/24 0755         30
 
03/24 0535         30
 
03/24 0400      97 Ventilator 30% 
 
03/24 0305         30
 
03/24 0046         30
 
03/24 0000      97 Ventilator 30% 
 
03/24 0000 97.3 87 20 161/73  97 Ventilator 30% 
 
03/23 2231         30
 
03/23 2056         30
 
03/23 2048      97 Ventilator 30% 
 
03/23 1848  92  186/90     
 
03/23 1652         30
 
03/23 1642  80  200/90     
 
03/23 1600      96 Ventilator 30% 
 
03/23 1600 98.3 78 20 204/100  98 Ventilator 30% 
 
03/23 1500  70  200/100     
 
03/23 1433         30
 
03/23 1312  86  202/81     
 
 
 Intake & Output
 
 
 03/24 1600 03/24 0800 03/24 0000
 
Intake Total  160 80
 
Output Total  90 112
 
Balance  70 -32
 
    
 
Intake, Tube  100 50
 
Feeding   
 
Intake, Tube  60 30
 
Irrigant   
 
Output, Urine  90 112

## 2018-03-24 NOTE — PN- RESIDENT CRCU
Subjective
HPI/CRCU Issues:
Unable to obtain labs from central line however able to give meds through the 
line per nursing staff
 
Patient remains intubated and unresponsive, off sedation
Patient had dialysis yesterday.  Patient is planned for dialysis this afternoon.
 
 
Vitals:
MAXIMUM TEMPERATURE 99.3, heart rate , sinus rhythm to sinus tach, 
respiration rate 20, blood pressure yesterday afternoon was around 200/100.  
This morning blood pressure was running in the 160s over 70s.  Saturating at 97-
98% on mechanical ventilation, tidal volume of 500, FiO2 of 30, PEEP of 5
 
Total intake 4349 output 3784
 
Accu-Cheks: 199, 222, 208, 232, 215, 189
 
Labs:
WBC 17.1 with 79.5% granulocytes and no bands on steroids, H&H 10.2 and 30.8, 
platelets 245
 
Sodium 144, potassium 3.5, chloride 104, bicarbonate 21, anion gap 19, BUN 69, 
creatinine 6.1, glucose 250, 9.0, phosphorus 5.1, magnesium 2.2, LFTs within 
normal limits, albumin 2.7
 
Objective
 
Vital Signs & I&O
Last 8 Hrs of Vitals and I&O:
 Vital Signs
 
 
Date Time Temp Pulse Resp B/P B/P Pulse O2 O2 Flow FiO2
 
     Mean Ox Delivery Rate 
 
 1903  103  139/68     
 
 1615         30
 
 1600 97.8 107 20 132/70  98 Ventilator 30% 
 
 1600      97 Ventilator 30% 
 
 1559  120  97/48     
 
 1437         30
 
 1200      96 Ventilator 30% 
 
 1104         30
 
 0800 99.4 90 20 142/74  97 Ventilator 30% 
 
 0800      97 Ventilator 30% 
 
 0755         30
 
 0535         30
 
 0400      97 Ventilator 30% 
 
 0305         30
 
 0046         30
 
 0000      97 Ventilator 30% 
 
 0000 97.3 87 20 161/73  97 Ventilator 30% 
 
 2231         30
 
 2056         30
 
 2048      97 Ventilator 30% 
 
 
 Intake & Output
 
 
  1600  0800  0000
 
Intake Total 270 160 80
 
Output Total 100 90 112
 
Balance 170 70 -32
 
    
 
Intake, IV 50  
 
Intake, Tube 160 100 50
 
Feeding   
 
Intake, Tube 60 60 30
 
Irrigant   
 
Output, Urine 100 90 112
 
Patient  180 lb 
 
Weight   
 
 
 Intake & Output
 
 
  1600
 
Intake Total 270
 
Output Total 100
 
Balance 170
 
  
 
Intake, IV 50
 
Intake, Tube 160
 
Feeding 
 
Intake, Tube 60
 
Irrigant 
 
Output, Urine 100
 
Patient 180 lb
 
Weight 
 
 
 
 
Exam
General Appearance: well developed/nourished, no apparent distress, intubated, 
unresponsive, moves legs spontaneously, occasional twitching and eye movement
Head: atraumatic, normal appearance
Respiratory: normal breath sounds, chest non-tender, no respiratory distress, 
lungs clear
Cardiovascular: regular rate/rhythm
Gastrointestinal: normal bowel sounds, soft
Extremities: normal inspection, no edema
Cranial Nerves: PERRL
Current Medications:
 Current Medications
 
 
  Sig/Susan Start time  Last
 
Medication Dose Route Stop Time Status Admin
 
Acetaminophen 1,000 MG Q6P PRN  1445 AC 
 
  IV   0551
 
Amlodipine Besylate 2.5 MG DAILY  1815 AC 
 
  PO   1903
 
Amlodipine Besylate 10 MG DAILY  1000 DC 
 
  PO   
 
Artificial Tears 2 GTT 4 TIMES/DAY  1400 AC 
 
  OPH   1801
 
Aspirin 81 MG DAILY  1000 AC 
 
  PO   1757
 
Atorvastatin Calcium 40 MG 1700  1700 AC 
 
  PO   1757
 
Epoetin Clark 8,000 UNIT  ..  1300 AC 
 
  IV   
 
Hydralazine HCl 10 MG Q8  1400 AC 
 
  IV   
 
Hydralazine HCl 10 MG TID  1000 DC 
 
  IV   
 
Hydrocortisone  12.5 MG BID  1000 AC 
 
Sodium Succinate  IV   0945
 
Insulin Aspart 0 Q4H  1200 AC 
 
  SC   1714
 
Insulin Detemir 11 UNITS BID  2200 AC 
 
  SC   
 
Insulin Detemir 8 UNITS BID  1000 DC 
 
  SC   0945
 
Levothyroxine Sodium 25 MCG DAILY  1000 AC 
 
  IV   1011
 
Lorazepam 1 MG Q4P PRN  1400 AC 
 
  IV   
 
Nitroglycerin 0.5 GM Q6  1200 AC 
 
  TOP   1800
 
Pantoprazole Sodium 40 MG DAILY  1730 AC 
 
  IV   0945
 
Scopolamine HBr 1 PAT Q72H PRN  1600 AC 
 
  TOP   1640
 
 
 
 
Impression/Plan
 
Impression/Problem List
Impression:
This is 58-year-old female with a medical history of stage 5 CKD(nephrotic 
proteinuria, diabetic nephropathy and retinopathy) with left AV fistula placed 
couple month ago(still making urine), currently not on hemodialysis, has only 
one kidney since childhood.  Chronic anemia, hypertension, type 1 diabetes on 
insulin pump, hypothyroidism, ankylosing spondylitis on chronic prednisone, 
depression, GERD, right lung mass according to the family the biopsy came back 
as a benign, hyperparathyroidism due to renal insufficiency, chronic pain.  
Presented to the emergency department via EMS due to unresponsiveness.
 
 
Patient is admitted to the ICU for management of the following: 
 
Respiratory: 
 
Acute hypoxic respiratory failure 2/2 to aspiration pneumonia on mechanical 
ventilation: 
Patient continues to remain on mechanical ventilation. Oxygen requirement 
continues to improve with decreased FiO2. Patient's remains unresponsive off 
sedation.
 
- continue mechanical ventilation
- ativan 1mg q4h PRN for agitation 
- Repeat chest x-ray in a.m.
 
Infection: 
Aspiration Pneumonia: 
Patient continues to be tachycardic, febrile with elevated leukocytosis which is
downtrending on IV Unasyn.  No growth on cultures thus far.  Repeat chest xray 
shows persistent bilateral airspace opacities with largest area of consolidation
in the right upper lobe. Patient remains on ventilatory support. CT Chest 
showing lobulated mass in the right upper lobe medially is not significantly 
changed in size. There are now patchy areas of ground glass opacification 
nodularity in both lungs, which may be consistent with multifocal pneumonic 
infiltrates.
 
- completed 5 days of antibiotic therapy. 
- continue to monitor CBCs, vitals
- Follow-up blood cultures, sputum culture 
- ID on board.  Appreciate recommendations.
 
Cardiac:
Status post cardiac arrest in the ED
New right bundle branch block, wide QRS tachycardia
NSTEMI- likely 2/2 to demand ischemia
Patient's troponin's peaked to 4.20. Patient remains unresponsive on mechanical 
ventilator. Patient's heparin was temporarily stopped on 3/20 due to concerns of
bleeding from pérez site. Patient was seen by cardiology. H/H was stable and 
patient's heparin was shortly after on 3/20.
- Off IV heparin. Patient's H/H stable. 
- continue aspirin and statin
 
Heme: 
 
Anemia
Patient has a history of chronic anemia. H/H dropped to 7.6 this morning. 
Patient had pérez placement in the OR today with continued bleeding which is 
expected per urology. Patient is also having guiac positive stools. Repeat H/H 
after was 7.1. Patient s/p 1 unit pRBC with dialysis on 3/22. Repeat H/H was >9 
this morning.  
 
- continue to mmonitor H/H
- continue to monitor for signs of bleeding 
 
Metabolic: 
 
Type 1 Diabetes - on Insulin Pump
Patient initially presented with hypoglycemia w/ BG of 27. Patients BG over the 
past 24 hours was up to the 400s today. Levemir was increased. Hydrocortisone 
was decreased today.  
- Endocrinology on board. Appreciate recommendations 
- Increased Levemir to 11 units BID 
- Insulin SS adjusted per endocrinology recommendations 
- Target BG per endocrinology: FSG of 100 to 200. 
 
Adrenal Insufficiency. Patient is on chronic prednisone for interstitital 
nephritis.
- IV hydrocortisone 12.5mg IV BID 
 
Metabolic acidosis with elevated anion gap and lactic acidosis - Resolved
Patient's anion gap is down from previous day. Lactic acid was previously 
fluctuating however is now downtrending. Patient was previously getting IV 
Bicarb. 
 
Alimentary:
 
Patient is currently NPO due to intubation.
Resume tube feeds today. 
Fluids are being given cautiously due to ECHO showing LVEF of 30%. 
 
Neurology:
 
Unresponsiveness - likely multifactorial at this point. Initially secondary to 
severe hypoglycemia. Patient likely had a seizure prior to arrival as she was 
found to have urinary and bladder incontinence and had an elevated prolactin 
level on admission. Patient was also found to have vomitted and aspirated. In 
the ED on day of admision patient had a cardiac arrest for approximately 10 
minutes. Patient had a head CT on admission which was nondiagnostic due to 
motion artifact. Repeat CT on 3/21 showed extensive loss of gray-white matter 
differentiation primarily involving the basal ganglia, right insular cortex, and
both temporal lobes. These findings are consistent with hypoxic ischemic injury.
No acute hemorrhage.
- neurology is on board. appreciate recommendations 
- continue neurochecks
- continue to watch off sedation 
- continue aspiration protocol 
- continue aspirin 
- continue to monitor and replete electrolytes 
 
Nephrology:
 
ESRD w/left sided AV fistula: 
Patient has a history of ESRD with biopsy showing chronic intersitial nephritis 
and diabetic nephropathy. Patient was placed on a course of steroids which were 
tapered off. Patient has been evaluated by nephrology today. IV Bicarb was 
discontinued. Patient is currently being dialyzed today. 
- patient is to go for dialysis again tomorrow. 
- continue to monitor electrolytes 
- continue to monitor I/O 
- nephrology consulted. appreciate recommendations 
 
 
Urology: 
Urethral stricture. Nursing was unable to placed pérez. Urology was consulted 
and placed a 16 Syriac pérez after urethral dilation. Patient's pérez appears to
be obstructed today with what appears to be clotted frothy blood in the bag and 
area around the pérez with bleeding and showing leakage. Irrigation on 3/20 did 
not improve the patency of the pérez. The pérez was removed in the morning on 3/
21 by urology. Patient's PVR >500 on 3/21 and Dr. Garvin attempted to place a
pérez at the bedside however was unable to. Patient was taken to the OR on 3/22 
for cystoscopy with successful pérez placement. Cystoscopy showed false urethral
passage. Patient drained 800cc immediateley after pérez placement. 
- continue to monitor output 
- continue to monitor for excessive bleeding
- urology on board
 
DVT PPx: 
ALPs only 
 
Code: 
Full code 
 
Dispo: 
Patient continues to have a poor neurological status. Had a family discussion 
today. Family is aware of poor prognosis but would like to continue full 
measures at this time. Will readdress patient's status tomorrow. 
Problem List:
 1. Hypoxic brain injury
 
 2. NSTEMI (non-ST elevated myocardial infarction)
 
 3. End stage renal disease
 
Pain Ratin
Tomorrow's Labs & Rationales:
cbc
bep 
 
 
Plan
DVT/Prophylaxis: mechanical, pharmacological

## 2018-03-24 NOTE — PN- NEPHROLOGY
Assessment/Plan Nephrology
Assessment:
Hemodynamically stable.  
Suggestion:
Plan for dialysis later todya with 2 liter UF.  AVF may be problematic but we 
were successful with it yesterday.  
 
 
Subjective
Subjective:
Patient still unresponsive, on ventilator.
 
Objective
Vital Signs and I&Os
Vital Signs
 
 
Date Time Temp Pulse Resp B/P B/P Pulse O2 O2 Flow FiO2
 
     Mean Ox Delivery Rate 
 
03/24 0800 99.4 90 20 142/74  97 Ventilator 30% 
 
03/24 0800      97 Ventilator 30% 
 
03/24 0755         30
 
03/24 0535         30
 
03/24 0400      97 Ventilator 30% 
 
03/24 0305         30
 
03/24 0046         30
 
03/24 0000      97 Ventilator 30% 
 
03/24 0000 97.3 87 20 161/73  97 Ventilator 30% 
 
03/23 2231         30
 
03/23 2056         30
 
03/23 2048      97 Ventilator 30% 
 
03/23 1848  92  186/90     
 
03/23 1652         30
 
03/23 1642  80  200/90     
 
03/23 1600      96 Ventilator 30% 
 
03/23 1600 98.3 78 20 204/100  98 Ventilator 30% 
 
03/23 1500  70  200/100     
 
03/23 1433         30
 
03/23 1312  86  202/81     
 
03/23 1200      98 Ventilator 30% 
 
 
 Intake & Output
 
 
 03/24 1600 03/24 0400 03/23 1600 03/23 0400 03/22 1600 03/22 0400
 
Intake Total 160 80 100 120 157 257.6
 
Output Total 90 112 420 300 300 
 
Balance 70 -32 -320 -180 -143 257.6
 
       
 
Intake, IV   100 120 157 197.6
 
Intake, Other      60
 
Intake, Tube 100 50    
 
Feeding      
 
Intake, Tube 60 30    
 
Irrigant      
 
Number     2 2
 
Bowel      
 
Movements      
 
Output,   100   
 
Gastric      
 
Drainage      
 
Output, Urine 90 112 320 300 300 
 
Patient     176 lb 
 
Weight      
 
Weight     Bed scale 
 
Measurement      
 
Method      
 
 
 
Physical Exam:
NAD  VS as above  Lungs: clear CV: no rub  Abd: nontender Ext: trace edema Neuro
: comatose.
Current Medications:
 Current Medications
 
 
  Sig/Susan Start time  Last
 
Medication Dose Route Stop Time Status Admin
 
Acetaminophen 1,000 MG Q6P PRN 03/19 1445 AC 03/20
 
  IV   0551
 
Amlodipine Besylate 5 MG DAILY 03/24 1000 DC 
 
  PO   
 
Amlodipine Besylate 10 MG DAILY 03/24 1000 AC 
 
  PO   
 
Amlodipine Besylate 2.5 MG ONCE ONE 03/23 1600 DC 03/23
 
  PO 03/23 1601  1500
 
Amlodipine Besylate 2.5 MG DAILY 03/23 1211 DC 03/23
 
  PO   1312
 
Ampicillin Sodium/ 3,000 MG Q12 03/19 1730 DC 03/23
 
Sulbactam Sodium  IV   1200
 
Sodium Chloride 100 ML    
 
Artificial Tears 2 GTT 4 TIMES/DAY 03/23 1400 AC 03/24
 
  OPH   1012
 
Aspirin 81 MG DAILY 03/21 1000 AC 03/23
 
  PO   1223
 
Atorvastatin Calcium 40 MG 1700 03/21 1700 AC 03/23
 
  PO   1641
 
Hydralazine HCl 10 MG TID 03/24 1000 AC 
 
  IV   
 
Hydralazine HCl 5 MG ONCE ONE 03/23 1830 DC 03/23
 
  IV 03/23 1831  1848
 
Hydralazine HCl 5 MG ONCE ONE 03/23 1630 DC 03/23
 
  IV 03/23 1631  1642
 
Hydrocortisone  12.5 MG BID 03/23 1000 AC 03/24
 
Sodium Succinate  IV   0945
 
Insulin Aspart 0 Q4H 03/23 1200 AC 03/24
 
  SC   0849
 
Insulin Aspart 0 Q4 03/19 2200 DC 03/23
 
  SC   0615
 
Insulin Detemir 8 UNITS BID 03/23 1000 AC 03/24
 
  SC   0945
 
Levothyroxine Sodium 25 MCG DAILY 03/20 1000 AC 03/24
 
  IV   1011
 
Lorazepam 1 MG Q4P PRN 03/20 1400 AC 
 
  IV   
 
Nitroglycerin 0.5 GM Q6 03/24 1200 AC 
 
  TOP   
 
Pantoprazole Sodium 40 MG DAILY 03/19 1730 AC 03/24
 
  IV   0945
 
Scopolamine HBr 1 PAT Q72H PRN 03/20 1600 AC 03/20
 
  TOP   1640
 
 
 
 
Results
Pertinent Lab Results:
 Laboratory Tests
 
 
 03/23 03/22
 
 0405 2010
 
Chemistry  
 
  Sodium (137 - 145 mmol/L) 147  H 
 
  Potassium (3.5 - 5.1 mmol/L) 4.1 
 
  Chloride (98 - 107 mmol/L) 107 
 
  Carbon Dioxide (22 - 30 mmol/L) 22 
 
  Anion Gap (5 - 16) 18  H 
 
  BUN (7 - 17 mg/dL) 79  H 
 
  Creatinine (0.5 - 1.0 mg/dL) 7.0 *H 
 
  Estimated GFR (>60 ml/min) 6  L 
 
  Glucose (65 - 99 mg/dL) 214  H 
 
  Calcium (8.4 - 10.2 mg/dL) 9.1 
 
  Phosphorus (2.5 - 4.5 mg/dL) 6.7  H 
 
  Magnesium (1.6 - 2.3 mg/dL) 2.1 
 
  Total Bilirubin (0.2 - 1.3 mg/dL) 0.9 
 
  AST (14 - 36 U/L) 41  H 
 
  ALT (9 - 52 U/L) 54  H 
 
  Albumin (3.5 - 5.0 g/dL) 2.4  L 
 
Hematology  
 
  CBC w Diff NO MAN DIFF REQ NO MAN DIFF REQ
 
  WBC (4.8 - 10.8 /CUMM) 10.8 13.1  H
 
  RBC (4.20 - 5.40 /CUMM) 3.39  L 3.39  L
 
  Hgb (12.0 - 16.0 G/DL) 9.4  L 9.2  L
 
  Hct (37 - 47 %) 28.0  L 28.6  L
 
  MCV (81.0 - 99.0 FL) 82.6 84.2
 
  MCH (27.0 - 31.0 PG) 27.7 27.1
 
  MCHC (33.0 - 37.0 G/DL) 33.6 32.2  L
 
  RDW (11.5 - 14.5 %) 17.8  H 17.6  H
 
  Plt Count (130 - 400 /CUMM) 265 281
 
  MPV (7.4 - 10.4 FL) 7.3  L 7.6
 
  Gran % (42.2 - 75.2 %) 73.1 85.3  H
 
  Lymphocytes % (20.5 - 51.1 %) 20.3  L 11.0  L
 
  Monocytes % (1.7 - 9.3 %) 6.3 3.5
 
  Eosinophils % (0 - 5 %) 0.1 0
 
  Basophils % (0.0 - 2.0 %) 0.2 0.2
 
  Absolute Granulocytes (1.4 - 6.5 /CUMM) 7.9  H 11.1  H
 
  Absolute Lymphocytes (1.2 - 3.4 /CUMM) 2.2 1.4
 
  Absolute Monocytes (0.10 - 0.60 /CUMM) 0.7  H 0.5
 
  Absolute Eosinophils (0.0 - 0.7 /CUMM) 0 0
 
  Absolute Basophils (0.0 - 0.2 /CUMM) 0 0
 
 
 
 
 03/22 03/22
 
 1000 0600
 
Blood Gas  
 
  pH (7.35 - 7.45 PH)  7.48  H
 
  pCO2 (35 - 45 TORR)  28  L
 
  pO2 (80 - 100 TORR)  101  H
 
  HCO3 (21 - 28 MEQ/L)  21
 
  ABG O2 Sat (Measured) (>96.0 %)  97.0
 
  P-50 (Temp Corrected)  N
 
  Carboxyhemoglobin (1.5 - 5.0 %)  0.3  L
 
  O2 Concentration %  .30
 
  Respiration Rate (BPM)  20
 
  O2 Delivery Method  VENT
 
  Vent Mode  A/C
 
  Expiratory Pressure (CMH2O/P)  5
 
  Tidal Volume (CC)  500
 
Hematology  
 
  CBC w Diff NO MAN DIFF REQ 
 
  WBC (4.8 - 10.8 /CUMM) 11.3  H 
 
  RBC (4.20 - 5.40 /CUMM) 2.72  L 
 
  Hgb (12.0 - 16.0 G/DL) 7.1 *L 
 
  Hct (37 - 47 %) 22.2  L 
 
  MCV (81.0 - 99.0 FL) 81.6 
 
  MCH (27.0 - 31.0 PG) 26.2  L 
 
  MCHC (33.0 - 37.0 G/DL) 32.1  L 
 
  RDW (11.5 - 14.5 %) 18.5  H 
 
  Plt Count (130 - 400 /CUMM) 280 
 
  MPV (7.4 - 10.4 FL) 7.3  L 
 
  Gran % (42.2 - 75.2 %) 77.2  H 
 
  Lymphocytes % (20.5 - 51.1 %) 17.5  L 
 
  Monocytes % (1.7 - 9.3 %) 5.0 
 
  Eosinophils % (0 - 5 %) 0.1 
 
  Basophils % (0.0 - 2.0 %) 0.2 
 
  Absolute Granulocytes (1.4 - 6.5 /CUMM) 8.7  H 
 
  Absolute Lymphocytes (1.2 - 3.4 /CUMM) 2.0 
 
  Absolute Monocytes (0.10 - 0.60 /CUMM) 0.6 
 
  Absolute Eosinophils (0.0 - 0.7 /CUMM) 0 
 
  Absolute Basophils (0.0 - 0.2 /CUMM) 0 
 
Miscellaneous  
 
  Phlebotomy Draw Site  RIGHT BRACHIAL
 
 
 
 
 03/22 03/21
 
 0425 2210
 
Chemistry  
 
  Sodium (137 - 145 mmol/L) 149  H 
 
  Potassium (3.5 - 5.1 mmol/L) 4.1 
 
  Chloride (98 - 107 mmol/L) 108  H 
 
  Carbon Dioxide (22 - 30 mmol/L) 23 
 
  Anion Gap (5 - 16) 18  H 
 
  BUN (7 - 17 mg/dL) 103 *H 
 
  Creatinine (0.5 - 1.0 mg/dL) 8.7 *H 
 
  Estimated GFR (>60 ml/min) 5  L 
 
  Glucose (65 - 99 mg/dL) 152  H 
 
  Calcium (8.4 - 10.2 mg/dL) 8.8 
 
  Phosphorus (2.5 - 4.5 mg/dL) 8.7  H 
 
  Magnesium (1.6 - 2.3 mg/dL) 2.1 
 
  Total Bilirubin (0.2 - 1.3 mg/dL) 0.8 
 
  AST (14 - 36 U/L) 41  H 
 
  ALT (9 - 52 U/L) 60  H 
 
  Albumin (3.5 - 5.0 g/dL) 2.2  L 
 
Coagulation  
 
  APTT (25 - 37 SEC)  50  H
 
Hematology  
 
  CBC w Diff MAN DIFF ORDERED 
 
  WBC (4.8 - 10.8 /CUMM) 11.8  H 
 
  RBC (4.20 - 5.40 /CUMM) 2.86  L 
 
  Hgb (12.0 - 16.0 G/DL) 7.6  L 
 
  Hct (37 - 47 %) 23.1  L 
 
  MCV (81.0 - 99.0 FL) 80.7  L 
 
  MCH (27.0 - 31.0 PG) 26.6  L 
 
  MCHC (33.0 - 37.0 G/DL) 33.0 
 
  RDW (11.5 - 14.5 %) 18.5  H 
 
  Plt Count (130 - 400 /CUMM) 310 
 
  MPV (7.4 - 10.4 FL) 7.6 
 
  Gran % (42.2 - 75.2 %) 82.2  H 
 
  Lymphocytes % (20.5 - 51.1 %) 14.4  L 
 
  Monocytes % (1.7 - 9.3 %) 3.3 
 
  Eosinophils % (0 - 5 %) 0 
 
  Basophils % (0.0 - 2.0 %) 0.1 
 
  Absolute Granulocytes (1.4 - 6.5 /CUMM) 9.7  H 
 
  Segmented Neutrophils (42.2 - 75.2 %) 85  H 
 
  Band Neutrophils (0.0 - 5.0 %) 1 
 
  Absolute Lymphocytes (1.2 - 3.4 /CUMM) 1.7 
 
  Lymphocytes (20.5 - 51.1 %) 11  L 
 
  Monocytes (1.7 - 9.3 %) 2 
 
  Absolute Monocytes (0.10 - 0.60 /CUMM) 0.4 
 
  Absolute Eosinophils (0.0 - 0.7 /CUMM) 0 
 
  Absolute Basophils (0.0 - 0.2 /CUMM) 0 
 
  Metamyelocytes (0.0 - 1.0 %) 1 
 
  Platelet Estimate (ADEQUATE) ADEQUATE 
 
  Polychromasia 1+ 
 
  Ovalocytes FEW 
 
Other Body Source  
 
  Fld Total RBCs Counted (%) 100

## 2018-03-24 NOTE — PN- CRCU
Subjective
HPI/Critical Care Issues:
Patient remains obtunded intubated
 
Objective
Current Medications:
 Current Medications
 
 
  Sig/Susan Start time  Last
 
Medication Dose Route Stop Time Status Admin
 
Acetaminophen 1,000 MG Q6P PRN 03/19 1445 AC 03/20
 
  IV   0551
 
Amlodipine Besylate 5 MG DAILY 03/24 1000 DC 
 
  PO   
 
Amlodipine Besylate 10 MG DAILY 03/24 1000 AC 
 
  PO   
 
Amlodipine Besylate 2.5 MG ONCE ONE 03/23 1600 DC 03/23
 
  PO 03/23 1601  1500
 
Amlodipine Besylate 2.5 MG DAILY 03/23 1211 DC 03/23
 
  PO   1312
 
Ampicillin Sodium/ 3,000 MG Q12 03/19 1730 DC 03/23
 
Sulbactam Sodium  IV   1200
 
Sodium Chloride 100 ML    
 
Artificial Tears 2 GTT 4 TIMES/DAY 03/23 1400 AC 03/24
 
  OPH   1012
 
Aspirin 81 MG DAILY 03/21 1000 AC 03/23
 
  PO   1223
 
Atorvastatin Calcium 40 MG 1700 03/21 1700 AC 03/23
 
  PO   1641
 
Hydralazine HCl 10 MG TID 03/24 1000 AC 
 
  IV   
 
Hydralazine HCl 5 MG ONCE ONE 03/23 1830 DC 03/23
 
  IV 03/23 1831  1848
 
Hydralazine HCl 5 MG ONCE ONE 03/23 1630 DC 03/23
 
  IV 03/23 1631  1642
 
Hydrocortisone  12.5 MG BID 03/23 1000 AC 03/24
 
Sodium Succinate  IV   0945
 
Insulin Aspart 0 Q4H 03/23 1200 AC 03/24
 
  SC   0849
 
Insulin Aspart 0 Q4 03/19 2200 DC 03/23
 
  SC   0615
 
Insulin Detemir 8 UNITS BID 03/23 1000 AC 03/24
 
  SC   0945
 
Levothyroxine Sodium 25 MCG DAILY 03/20 1000 AC 03/24
 
  IV   1011
 
Lorazepam 1 MG Q4P PRN 03/20 1400 AC 
 
  IV   
 
Nitroglycerin 0.5 GM Q6 03/24 1200 AC 
 
  TOP   
 
Pantoprazole Sodium 40 MG DAILY 03/19 1730 AC 03/24
 
  IV   0945
 
Scopolamine HBr 1 PAT Q72H PRN 03/20 1600 AC 03/20
 
  TOP   1640
 
 
 
 
Vital Signs & I&O
Last 24 Hrs of Vitals and I&O:
 Vital Signs
 
 
Date Time Temp Pulse Resp B/P B/P Pulse O2 O2 Flow FiO2
 
     Mean Ox Delivery Rate 
 
03/24 0800 99.4 90 20 142/74  97 Ventilator 30% 
 
03/24 0800      97 Ventilator 30% 
 
03/24 0755         30
 
03/24 0535         30
 
03/24 0400      97 Ventilator 30% 
 
03/24 0305         30
 
03/24 0046         30
 
03/24 0000      97 Ventilator 30% 
 
03/24 0000 97.3 87 20 161/73  97 Ventilator 30% 
 
03/23 2231         30
 
03/23 2056         30
 
03/23 2048      97 Ventilator 30% 
 
03/23 1848  92  186/90     
 
03/23 1652         30
 
03/23 1642  80  200/90     
 
03/23 1600      96 Ventilator 30% 
 
03/23 1600 98.3 78 20 204/100  98 Ventilator 30% 
 
03/23 1500  70  200/100     
 
03/23 1433         30
 
03/23 1312  86  202/81     
 
03/23 1200      98 Ventilator 30% 
 
 
 Intake & Output
 
 
 03/24 1600 03/24 0800 03/24 0000
 
Intake Total  160 80
 
Output Total  90 112
 
Balance  70 -32
 
    
 
Intake, Tube  100 50
 
Feeding   
 
Intake, Tube  60 30
 
Irrigant   
 
Output, Urine  90 112
 
 
Saturation 30% 97% exam for chest shows rare rhonchi cardiac exam shows regular 
S1 and S2 without murmurs abdomen is soft nontender
 
Impression/Plan
 
Impression/Plan
Impression/Plan:
58-year-old woman with presumed anoxic brain damage remains ventilator dependent
Recommendations:
Family discussion regarding the significance of anoxic brain damage and 
procedure for removal from life support.  Continue current level of care while 
family is considering direction of care

## 2018-03-25 VITALS — DIASTOLIC BLOOD PRESSURE: 58 MMHG | SYSTOLIC BLOOD PRESSURE: 122 MMHG

## 2018-03-25 VITALS — SYSTOLIC BLOOD PRESSURE: 114 MMHG | DIASTOLIC BLOOD PRESSURE: 62 MMHG

## 2018-03-25 VITALS — SYSTOLIC BLOOD PRESSURE: 110 MMHG | DIASTOLIC BLOOD PRESSURE: 60 MMHG

## 2018-03-25 LAB
ABSOLUTE GRANULOCYTE CT: 15.1 /CUMM (ref 1.4–6.5)
BASOPHILS # BLD: 0 /CUMM (ref 0–0.2)
BASOPHILS NFR BLD: 0.3 % (ref 0–2)
EOSINOPHIL # BLD: 0.1 /CUMM (ref 0–0.7)
EOSINOPHIL NFR BLD: 0.3 % (ref 0–5)
ERYTHROCYTE [DISTWIDTH] IN BLOOD BY AUTOMATED COUNT: 17.9 % (ref 11.5–14.5)
GRANULOCYTES NFR BLD: 78.8 % (ref 42.2–75.2)
HCT VFR BLD CALC: 32.8 % (ref 37–47)
LYMPHOCYTES # BLD: 2.7 /CUMM (ref 1.2–3.4)
MCH RBC QN AUTO: 27.1 PG (ref 27–31)
MCHC RBC AUTO-ENTMCNC: 32.3 G/DL (ref 33–37)
MCV RBC AUTO: 84 FL (ref 81–99)
MONOCYTES # BLD: 1.3 /CUMM (ref 0.1–0.6)
PLATELET # BLD: 265 /CUMM (ref 130–400)
PMV BLD AUTO: 8.6 FL (ref 7.4–10.4)
RED BLOOD CELL CT: 3.9 /CUMM (ref 4.2–5.4)
WBC # BLD AUTO: 19.1 /CUMM (ref 4.8–10.8)

## 2018-03-25 NOTE — RADIOLOGY REPORT
EXAMINATION:
CHEST 1 VIEW
 
CLINICAL INFORMATION:
Intubated.
 
COMPARISON:
Multiple prior exams are reviewed. The most recent is from March 23, 2018.
 
TECHNIQUE:
An AP view of the chest is provided.
 
FINDINGS:
The cardiac silhouette is not enlarged. An endotracheal tube is in place. The
tip is approximately 10 cm above the kaushal. An enteric tube is in place. The
tip overlies the left upper quadrant, likely within the stomach. The
mediastinal and hilar contours are unremarkable. There are neither pleural
effusions nor pneumothoraces. There is persistent mild hazy opacification
within the medial right upper lung zone. The osseous structures are
unremarkable.
 
IMPRESSION:
 
Endotracheal tube and enteric tube in place.
 
Persistent hazy airspace disease within the medial right upper lung zone.

## 2018-03-25 NOTE — EVENT NOTE
Event Note
Event Note:
Patients  wishes to convert patients code status from FULL CODE to DNR/
DNI. She remains intubtaed and continued on all other interventions. The 
patients  wishes that she is not to be resuscitated should she suddenly 
develop cardiac arrest. 
 
Goals of care meeting with several family members is to occur tomorrow. Comfort 
measures were discussed which will be addressed at that time.

## 2018-03-25 NOTE — PN- RESIDENT CRCU
Subjective
HPI/CRCU Issues:
Patient seen and examined. She is seen lying flat in bed with her head elevated 
intubated on a ventilator with multiple lines in place. She appears comfortable 
but in no acute distress. Subjective complaints and review of systems are 
unobtainable.
 
Objective
 
Vital Signs & I&O
Last 8 Hrs of Vitals and I&O:
Tele: Sinus tachycardia
HR: 110s- 130s
SBP: 110-140
 
Exam
General Appearance: intubated
Other Physical Findings:
GEN: ill appearing middle aged  woman intubated without sedation
HEENT: NCAT, PERRL, anicteric sclera, MMM, ET tube, OGT in place
NECK: Supple, no JVD
CARD: normal S1/S2 w/o m/g/r
PULM: CTA Bilaterally
ABD: Soft, NT, ND, BS +
: pérez catheter in place draining 
NEURO: unresponsive to verbal / tactile stimuli, no spontaneous purposeful 
movement
EXT: trace pedal edema, right LLE TLC in place
Current Medications:
 Current Medications
 
 
  Sig/Susan Start time  Last
 
Medication Dose Route Stop Time Status Admin
 
Acetaminophen 1,000 MG Q6P PRN  1445 AC 
 
  IV   0946
 
Amlodipine Besylate 2.5 MG DAILY  1815 AC 
 
  PO   1903
 
Amlodipine Besylate 10 MG DAILY  1000 DC 
 
  PO   
 
Artificial Tears 2 GTT 4 TIMES/DAY  1400 AC 
 
  OPH   0938
 
Aspirin 81 MG DAILY  1000 AC 
 
  PO   1757
 
Atorvastatin Calcium 40 MG 1700  1700 AC 
 
  PO   1757
 
Epoetin Clark 8,000 UNIT  ..  1300 AC 
 
  IV   
 
Hydralazine HCl 10 MG Q8  1400 AC 
 
  IV   0616
 
Hydralazine HCl 10 MG TID  1000 DC 
 
  IV   
 
Hydrocortisone  12.5 MG BID  1000 AC 
 
Sodium Succinate  IV   0938
 
Insulin Aspart 0 Q4H  1200 AC 
 
  SC   0829
 
Insulin Detemir 11 UNITS BID  2200 AC 
 
  SC   0946
 
Insulin Detemir 8 UNITS BID  1000 DC 
 
  SC   0945
 
Levothyroxine Sodium 25 MCG DAILY  1000 AC 
 
  IV   1021
 
Lorazepam 1 MG Q4P PRN  1400 AC 
 
  IV   
 
Nitroglycerin 0.5 GM Q6  1200 AC 
 
  TOP   0613
 
Pantoprazole Sodium 40 MG DAILY  1730 AC 
 
  IV   0938
 
Scopolamine HBr 1 PAT Q72H PRN  1600 AC 
 
  TOP   1640
 
 
 
 
Impression/Plan
 
Impression/Problem List
Impression:
58 year old woman with multiple medical problems significant for insulin-
dependent diabetes mellitus previously on insulin-pump and CKD-V s/p AVF brought
in by ambulance after being found unresponsive with profound hypoglycemia. 
Patients insulin-pump was reportedly "discontinued" the day prior. Patient 
developed bradycardia with subsequent cardiac arrest in the ED for which CPR was
started and ROSC acheived.
 
Patient continues to remain intubated on a ventilator without any sedation. She 
is non responsive to verbal tactile stimuli, but has brainstem function intact. 
Her heart rate was elevated this morning for unclear reasons, but responded to 
acetaminophen suggesting that is was pain related. Hydralazine is lowered as to 
allow for permissive hypertension. She remains on solucortef for adrenal 
insufficiency. Family meeting to occur tomorrow with to determine goals of care.
 
Problem List
-Anoxic Brain Injury s/p Cardiac arrest / profound hypoglycemia
-Probable insulin overdose
-ESRD, now on HD
-Acute urinary retention, urethral stricture s/p dilation/cystoscopy
-Elevated troponin, likely NSTEMI
-Adrenal insufficiency
-Insulin-dependent diabetes mellitus
-Chronic anemia
-GERD
-Hypothyroidism
 
Plan
-Continue ICU admission
-Intubated, on Vent: , RR 20, 30% FiO2, PEEP 5
-OGT
-Guaic all stool
-Pérez catheter
-Accuchecks with Novolog SSI Q4H
-Amlodipine 2.5 mg PO Daily
-Aspirin 81 mg PO Daily
-Hydralazine decreasd to 5 mg IV Q8H; hold for SBP < 160
-Protonix 40 mg IV Daily while intubated
-Solucortef discontinued
-Levemir 15 units SC BID
-Scopolamine Patch for oral secretions
-Continue home meds: levothyroxine
-Endocrinology following for hypoglycemia
-Urology following for urethral stricture
-Neurology following for anoxic brain injury
-Nephrology following for ESRD, HD
-ID following for infectious assessment
-Cardiology following for NSTEMI
-Follow cultures & sensitivites
-Daily CBC, ICU, CXR, ABG
-Pain control with acetaminophen
-NPO, on Tube Feeds
-DVT PPx with 
-FULL CODE
Problem List:
 1. Hypoxic brain injury
 
Pain Ratin
Tomorrow's Labs & Rationales:
CBC, ICU, ABG, CXR
 
Plan
DVT/Prophylaxis: mechanical, pharmacological

## 2018-03-25 NOTE — PN- CRCU
Subjective
HPI/Critical Care Issues:
Patient remains obtunded.  His access is limited and therefore femoral line will
remain in place until adequate peripheral access can be obtained.  Family 
meeting scheduled for tomorrow for consideration of extubation
 
Objective
Current Medications:
 Current Medications
 
 
  Sig/Susan Start time  Last
 
Medication Dose Route Stop Time Status Admin
 
Acetaminophen 1,000 MG Q6P PRN 03/19 1445 AC 03/25
 
  IV   0946
 
Amlodipine Besylate 2.5 MG DAILY 03/24 1815 AC 03/24
 
  PO   1903
 
Amlodipine Besylate 10 MG DAILY 03/24 1000 DC 
 
  PO   
 
Artificial Tears 2 GTT 4 TIMES/DAY 03/23 1400 AC 03/25
 
  OPH   0938
 
Aspirin 81 MG DAILY 03/21 1000 AC 03/24
 
  PO   1757
 
Atorvastatin Calcium 40 MG 1700 03/21 1700 AC 03/24
 
  PO   1757
 
Epoetin Clark 8,000 UNIT MONDAY WED FRIDAY .. 03/24 1300 AC 
 
  IV   
 
Hydralazine HCl 10 MG Q8 03/24 1400 AC 03/25
 
  IV   0616
 
Hydralazine HCl 10 MG TID 03/24 1000 DC 
 
  IV   
 
Hydrocortisone  12.5 MG BID 03/23 1000 AC 03/25
 
Sodium Succinate  IV   0938
 
Insulin Aspart 0 Q4H 03/23 1200 AC 03/25
 
  SC   0829
 
Insulin Detemir 11 UNITS BID 03/24 2200 AC 03/25
 
  SC   0946
 
Insulin Detemir 8 UNITS BID 03/23 1000 DC 03/24
 
  SC   0945
 
Levothyroxine Sodium 25 MCG DAILY 03/20 1000 AC 03/24
 
  IV   1011
 
Lorazepam 1 MG Q4P PRN 03/20 1400 AC 
 
  IV   
 
Nitroglycerin 0.5 GM Q6 03/24 1200 AC 03/25
 
  TOP   0613
 
Pantoprazole Sodium 40 MG DAILY 03/19 1730 AC 03/25
 
  IV   0938
 
Scopolamine HBr 1 PAT Q72H PRN 03/20 1600 AC 03/20
 
  TOP   1640
 
 
 
 
Vital Signs & I&O
Last 24 Hrs of Vitals and I&O:
 Vital Signs
 
 
Date Time Temp Pulse Resp B/P B/P Pulse O2 O2 Flow FiO2
 
     Mean Ox Delivery Rate 
 
03/25 0800 98.8 120 20 122/58  97 Ventilator 30% 
 
03/25 0750         30
 
03/25 0616 97.5 113 20 140/66     
 
03/25 0547         30
 
03/25 0400      97 Ventilator 30% 
 
03/25 0341         30
 
03/25 0200 97.0 123 20 110/60  94 Ventilator 30% 
 
03/25 0051         30
 
03/25 0000      94 Ventilator 30% 
 
03/24 2228 97.5 113 20 151/73     
 
03/24 2221         30
 
03/24 2000      95 Ventilator 30% 
 
03/24 1915         30
 
03/24 1903  103  139/68     
 
03/24 1615         30
 
03/24 1600 97.8 107 20 132/70  98 Ventilator 30% 
 
03/24 1600      97 Ventilator 30% 
 
03/24 1559  120  97/48     
 
03/24 1437         30
 
03/24 1200      96 Ventilator 30% 
 
03/24 1104         30
 
 
 Intake & Output
 
 
 03/25 1600 03/25 0800 03/25 0000
 
Intake Total  220 309
 
Output Total  49 1558
 
Balance  171 -1249
 
    
 
Intake, Tube  190 219
 
Feeding   
 
Intake, Tube  30 90
 
Irrigant   
 
Output,   1500
 
Dialysate   
 
Output, Urine  49 58
 
Patient  175 lb 174 lb
 
Weight   
 
Weight  Bed scale 
 
Measurement   
 
Method   
 
 
And saturation 97% on 30% exam for chest shows occasional rhonchi cardiac exam 
shows a regular rhythm
 
Impression/Plan
 
Impression/Plan
Impression/Plan:
[age]-year-old woman with severe anoxic brain damage remains obtunded and ventilator
dependent.
Recommendations:
Continue present level of support.  Change in code status discussed with family 
wishes to defer 2 more global conversation tomorrow.

## 2018-03-25 NOTE — PN- DIABETES
Assessment/Plan Diabetes
Assessment:
59 y/o female hx of longstanding DM type 1, chronic renal disease due to 
diabetes and chronic interstitual nephritis and was treated with a couse of 
steroid. 
 
Patient was found to be unresponsive with glucose level was in the 20s. Blood 
work in ER showed inappropriately elevated insulin level.  Her severe 
hypoglycemia most likely was due to insulin overdose.
 
She received hydrocortisone 100 mg iv in ER. Patient was intubated. She was on 
D10 w and pressor. Her BP and glucose level improved and D10 w and pressor were 
discontinued.  
 
She is now on hydrocortisone 12.5 mg iv twice a day. In addition, she was put on
Levothyroxine 25 mcg iv daily.
 
According to her insulin pump-- [email or web address] 630 G-- her basal insulin 36.75 units 
per 24 hours. 
 
She was on Levemir 16 units twice a day and Novolog coveage every 4 hours 
initially. Currently she is on Levemir 11 units twice a day; Novolog coverage 
every 4 hours. The tube feeding was increased to 35 ml/hour.  Her FSGs were 211,
219, 244 and 272.
 
Plan:
1. increase Levemir to 15 units twice a day;
2. adjust Novolog coverage every 4 hours--detail see the inpatient DM order;
3. monitor FSGs;
4. stop hydrocortisone today;
will follow.
 
Inpatient Diabetes Orders
Every 4 Hours:
   Bolus Insulin: Novolog
   < 80 mg/dl: no coverage
    mg/dl: no coverage
   101-120 mg/dl: 5units
   121-150 mg/dl: 5 units
   151-200 mg/dl: 7 units
   201-250 mg/dl: 9 units
   251-300 mg/dl: 11 units
   301-350 mg/dl: 13 units
   351-400 mg/dl: 15 units
   > 400 mg/dl: 17 units
 
 
Plan:
see above
 
Subjective
Subjective:
Patient is intubated.
 
Objective
Last 24 Hrs of Vital Signs/I&O
 Vital Signs
 
 
Date Time Temp Pulse Resp B/P B/P Pulse O2 O2 Flow FiO2
 
     Mean Ox Delivery Rate 
 
03/25 1105         30
 
03/25 0800 98.8 120 20 122/58  97 Ventilator 30% 
 
03/25 0800      97 Ventilator 30% 
 
03/25 0750         30
 
03/25 0616 97.5 113 20 140/66     
 
03/25 0547         30
 
03/25 0400      97 Ventilator 30% 
 
03/25 0341         30
 
03/25 0200 97.0 123 20 110/60  94 Ventilator 30% 
 
03/25 0051         30
 
03/25 0000      94 Ventilator 30% 
 
03/24 2228 97.5 113 20 151/73     
 
03/24 2221         30
 
03/24 2000      95 Ventilator 30% 
 
03/24 1915         30
 
03/24 1903  103  139/68     
 
03/24 1615         30
 
03/24 1600 97.8 107 20 132/70  98 Ventilator 30% 
 
03/24 1600      97 Ventilator 30% 
 
03/24 1559  120  97/48     
 
03/24 1437         30
 
 
 Intake & Output
 
 
 03/25 1600 03/25 0800 03/25 0000
 
Intake Total  220 309
 
Output Total  49 1558
 
Balance  171 -1249
 
    
 
Intake, Tube  190 219
 
Feeding   
 
Intake, Tube  30 90
 
Irrigant   
 
Output,   1500
 
Dialysate   
 
Output, Urine  49 58
 
Patient  175 lb 174 lb
 
Weight   
 
Weight  Bed scale 
 
Measurement   
 
Method   
 
 
 
 
Findings
Pertinent Lab/Sid Results:
 Laboratory Tests
 
 
 03/25 03/24
 
 1150 1305
 
Chemistry  
 
  Sodium (137 - 145 mmol/L) Pending 144
 
  Potassium (3.5 - 5.1 mmol/L) Pending 3.5
 
  Chloride (98 - 107 mmol/L) Pending 104
 
  Carbon Dioxide (22 - 30 mmol/L) Pending 21  L
 
  Anion Gap (5 - 16) Pending 19  H
 
  BUN (7 - 17 mg/dL) Pending 69  H
 
  Creatinine (0.5 - 1.0 mg/dL) Pending 6.1 *H
 
  Estimated GFR (>60 ml/min)  7  L
 
  Glucose (65 - 99 mg/dL) Pending 250  H
 
  Calcium (8.4 - 10.2 mg/dL) Pending 9.0
 
  Phosphorus (2.5 - 4.5 mg/dL) Pending 5.1  H
 
  Magnesium (1.6 - 2.3 mg/dL) Pending 2.2
 
  Total Bilirubin (0.2 - 1.3 mg/dL) Pending 0.8
 
  AST (14 - 36 U/L) Pending 42  H
 
  ALT (9 - 52 U/L) Pending 41
 
  Albumin (3.5 - 5.0 g/dL) Pending 2.7  L
 
Hematology  
 
  CBC w Diff Pending NO MAN DIFF REQ
 
  WBC (4.8 - 10.8 /CUMM) Pending 17.1  H
 
  RBC (4.20 - 5.40 /CUMM) Pending 3.69  L
 
  Hgb (12.0 - 16.0 G/DL) Pending 10.2  L
 
  Hct (37 - 47 %) Pending 30.8  L
 
  MCV (81.0 - 99.0 FL) Pending 83.6
 
  MCH (27.0 - 31.0 PG) Pending 27.7
 
  MCHC (33.0 - 37.0 G/DL) Pending 33.1
 
  RDW (11.5 - 14.5 %) Pending 17.9  H
 
  Plt Count (130 - 400 /CUMM) Pending 245
 
  MPV (7.4 - 10.4 FL) Pending 8.1
 
  Gran % (42.2 - 75.2 %)  79.5  H
 
  Lymphocytes % (20.5 - 51.1 %)  14.1  L
 
  Monocytes % (1.7 - 9.3 %)  6.0
 
  Eosinophils % (0 - 5 %)  0.3
 
  Basophils % (0.0 - 2.0 %)  0.1
 
  Absolute Granulocytes (1.4 - 6.5 /CUMM)  13.6  H
 
  Absolute Lymphocytes (1.2 - 3.4 /CUMM)  2.4
 
  Absolute Monocytes (0.10 - 0.60 /CUMM)  1.0  H
 
  Absolute Eosinophils (0.0 - 0.7 /CUMM)  0
 
  Absolute Basophils (0.0 - 0.2 /CUMM)  0

## 2018-03-25 NOTE — PN- INFECT DX
Subjective
Subjective:
Afebrile on steroids.  She has remained unresponsive.
 
Objective
Last 24 Hrs of Vital Signs/I&O
 Vital Signs
 
 
Date Time Temp Pulse Resp B/P B/P Pulse O2 O2 Flow FiO2
 
     Mean Ox Delivery Rate 
 
03/25 0800 98.8 120 20 122/58  97 Ventilator 30% 
 
03/25 0616 97.5 113 20 140/66     
 
03/25 0547         30
 
03/25 0400      97 Ventilator 30% 
 
03/25 0341         30
 
03/25 0200 97.0 123 20 110/60  94 Ventilator 30% 
 
03/25 0051         30
 
03/25 0000      94 Ventilator 30% 
 
03/24 2228 97.5 113 20 151/73     
 
03/24 2221         30
 
03/24 2000      95 Ventilator 30% 
 
03/24 1915         30
 
03/24 1903  103  139/68     
 
03/24 1615         30
 
03/24 1600 97.8 107 20 132/70  98 Ventilator 30% 
 
03/24 1600      97 Ventilator 30% 
 
03/24 1559  120  97/48     
 
03/24 1437         30
 
03/24 1200      96 Ventilator 30% 
 
03/24 1104         30
 
 
 Intake & Output
 
 
 03/25 1600 03/25 0800 03/25 0000
 
Intake Total  220 309
 
Output Total  49 1558
 
Balance  171 -1249
 
    
 
Intake, Tube  190 219
 
Feeding   
 
Intake, Tube  30 90
 
Irrigant   
 
Output,   1500
 
Dialysate   
 
Output, Urine  49 58
 
Patient  175 lb 174 lb
 
Weight   
 
Weight  Bed scale 
 
Measurement   
 
Method   
 
 
 
 
Physical Exam
Other Physical Findings:
She remains unresponsive on the ventilator
Lungs are clear
Heart regular rhythm with no murmur
Extremities no cyanosis, clubbing or edema
 Jose catheter remains in place
 
 
Results
Last 24 Hours of Lab Results:
 Laboratory Tests
 
 
 03/24
 
 1305
 
Chemistry 
 
  Sodium (137 - 145 mmol/L) 144
 
  Potassium (3.5 - 5.1 mmol/L) 3.5
 
  Chloride (98 - 107 mmol/L) 104
 
  Carbon Dioxide (22 - 30 mmol/L) 21  L
 
  Anion Gap (5 - 16) 19  H
 
  BUN (7 - 17 mg/dL) 69  H
 
  Creatinine (0.5 - 1.0 mg/dL) 6.1 *H
 
  Estimated GFR (>60 ml/min) 7  L
 
  Glucose (65 - 99 mg/dL) 250  H
 
  Calcium (8.4 - 10.2 mg/dL) 9.0
 
  Phosphorus (2.5 - 4.5 mg/dL) 5.1  H
 
  Magnesium (1.6 - 2.3 mg/dL) 2.2
 
  Total Bilirubin (0.2 - 1.3 mg/dL) 0.8
 
  AST (14 - 36 U/L) 42  H
 
  ALT (9 - 52 U/L) 41
 
  Albumin (3.5 - 5.0 g/dL) 2.7  L
 
Hematology 
 
  CBC w Diff NO MAN DIFF REQ
 
  WBC (4.8 - 10.8 /CUMM) 17.1  H
 
  RBC (4.20 - 5.40 /CUMM) 3.69  L
 
  Hgb (12.0 - 16.0 G/DL) 10.2  L
 
  Hct (37 - 47 %) 30.8  L
 
  MCV (81.0 - 99.0 FL) 83.6
 
  MCH (27.0 - 31.0 PG) 27.7
 
  MCHC (33.0 - 37.0 G/DL) 33.1
 
  RDW (11.5 - 14.5 %) 17.9  H
 
  Plt Count (130 - 400 /CUMM) 245
 
  MPV (7.4 - 10.4 FL) 8.1
 
  Gran % (42.2 - 75.2 %) 79.5  H
 
  Lymphocytes % (20.5 - 51.1 %) 14.1  L
 
  Monocytes % (1.7 - 9.3 %) 6.0
 
  Eosinophils % (0 - 5 %) 0.3
 
  Basophils % (0.0 - 2.0 %) 0.1
 
  Absolute Granulocytes (1.4 - 6.5 /CUMM) 13.6  H
 
  Absolute Lymphocytes (1.2 - 3.4 /CUMM) 2.4
 
  Absolute Monocytes (0.10 - 0.60 /CUMM) 1.0  H
 
  Absolute Eosinophils (0.0 - 0.7 /CUMM) 0
 
  Absolute Basophils (0.0 - 0.2 /CUMM) 0
 
 
 
Last 24 Hours of Sid Results:
No new cultures
 
Recent Imaging Studies:
Chest x-ray March 25 no change in the right upper lobe density
 
 
Assessment/Plan ID
Impression:
Condition unchanged, remaining unresponsive status post a cardiac arrest on 
admission, with evidence of hypoxic ischemic injury on her recent CT of the 
head.  She remains afebrile (on steroids for interstitial nephritis), with her 
white blood cell count yesterday elevated, likely secondary to steroids, now off
antibiotics after a 5 day course of Unasyn for possible aspiration pneumonia, 
with her respiratory status and her recent chest x-ray unchanged.  She has no 
other obvious source of infection; therefore feel she can continue to be 
followed off antibiotics.  Her prognosis remains quite poor, with no evidence of
neurologic recovery so far.
 
Suggestion:
1.  Remove right femoral triple lumen catheter
2.  Further management with regard to her neurologic status per Neurology/ICU 
team
3.  Continue to follow off antibiotics

## 2018-03-25 NOTE — PN- CARDIOLOGY
Subjective
Subjective:
The patient remains unresponsive and intubated.  There is no spontaneous motion.
 Her heart rate was a little high this morning in the 120s but has come down.  
Her blood pressure is in the 120s systolic.  Some of her BP meds have been held.
 She apparently was a little hypotensive during dialysis yesterday as well.
 
Objective
Vital Signs and I&Os
Vital Signs
 
 
Date Time Temp Pulse Resp B/P B/P Pulse O2 O2 Flow FiO2
 
     Mean Ox Delivery Rate 
 
03/25 0800 98.8 120 20 122/58  97 Ventilator 30% 
 
03/25 0800      97 Ventilator 30% 
 
03/25 0750         30
 
03/25 0616 97.5 113 20 140/66     
 
03/25 0547         30
 
03/25 0400      97 Ventilator 30% 
 
03/25 0341         30
 
03/25 0200 97.0 123 20 110/60  94 Ventilator 30% 
 
03/25 0051         30
 
03/25 0000      94 Ventilator 30% 
 
03/24 2228 97.5 113 20 151/73     
 
03/24 2221         30
 
03/24 2000      95 Ventilator 30% 
 
03/24 1915         30
 
03/24 1903  103  139/68     
 
03/24 1615         30
 
03/24 1600 97.8 107 20 132/70  98 Ventilator 30% 
 
03/24 1600      97 Ventilator 30% 
 
03/24 1559  120  97/48     
 
03/24 1437         30
 
03/24 1200      96 Ventilator 30% 
 
03/24 1104         30
 
 
 Intake & Output
 
 
 03/25 1600 03/25 0800 03/25 0000 03/24 1600 03/24 0800 03/24 0000
 
Intake Total  220 309 270 160 80
 
Output Total  49 1558 100 90 112
 
Balance  171 -1249 170 70 -32
 
       
 
Intake, IV    50  
 
Intake, Tube  190 219 160 100 50
 
Feeding      
 
Intake, Tube  30 90 60 60 30
 
Irrigant      
 
Output,   1500   
 
Dialysate      
 
Output, Urine  49 58 100 90 112
 
Patient  175 lb 174 lb  180 lb 
 
Weight      
 
Weight  Bed scale    
 
Measurement      
 
Method      
 
 
 
Physical Exam:
She remains unresponsive
Chest is aerating well
Heart mildly tachycardic, no murmurs
Extremities no edema
Neurologic no spontaneous activity
Current Medications:
 Current Medications
 
 
  Sig/Susan Start time  Last
 
Medication Dose Route Stop Time Status Admin
 
Acetaminophen 1,000 MG Q6P PRN 03/19 1445 AC 03/25
 
  IV   0946
 
Amlodipine Besylate 2.5 MG DAILY 03/24 1815 AC 03/24
 
  PO   1903
 
Amlodipine Besylate 10 MG DAILY 03/24 1000 DC 
 
  PO   
 
Artificial Tears 2 GTT 4 TIMES/DAY 03/23 1400 AC 03/25
 
  OPH   0938
 
Aspirin 81 MG DAILY 03/21 1000 AC 03/24
 
  PO   1757
 
Atorvastatin Calcium 40 MG 1700 03/21 1700 AC 03/24
 
  PO   1757
 
Epoetin Clark 8,000 UNIT MONDAY WED FRIDAY .. 03/24 1300 AC 
 
  IV   
 
Hydralazine HCl 10 MG Q8 03/24 1400 AC 03/25
 
  IV   0616
 
Hydralazine HCl 10 MG TID 03/24 1000 DC 
 
  IV   
 
Hydrocortisone  12.5 MG BID 03/23 1000 AC 03/25
 
Sodium Succinate  IV   0938
 
Insulin Aspart 0 Q4H 03/23 1200 AC 03/25
 
  SC   0829
 
Insulin Detemir 11 UNITS BID 03/24 2200 AC 03/25
 
  SC   0946
 
Insulin Detemir 8 UNITS BID 03/23 1000 DC 03/24
 
  SC   0945
 
Levothyroxine Sodium 25 MCG DAILY 03/20 1000 AC 03/25
 
  IV   1021
 
Lorazepam 1 MG Q4P PRN 03/20 1400 AC 
 
  IV   
 
Nitroglycerin 0.5 GM Q6 03/24 1200 AC 03/25
 
  TOP   0613
 
Pantoprazole Sodium 40 MG DAILY 03/19 1730 AC 03/25
 
  IV   0938
 
Scopolamine HBr 1 PAT Q72H PRN 03/20 1600 AC 03/20
 
  TOP   1640
 
 
 
 
Results
Last 48 Hrs of Labs/Mics:
 Laboratory Tests
 
03/24/18 1305:
Anion Gap 19  H, Estimated GFR 7  L, Glucose 250  H, Calcium 9.0, Phosphorus 5.1
 H, Magnesium 2.2, Total Bilirubin 0.8, AST 42  H, ALT 41, Albumin 2.7  L, CBC w
Diff NO MAN DIFF REQ, RBC 3.69  L, MCV 83.6, MCH 27.7, MCHC 33.1, RDW 17.9  H, 
MPV 8.1, Gran % 79.5  H, Lymphocytes % 14.1  L, Monocytes % 6.0, Eosinophils % 
0.3, Basophils % 0.1, Absolute Granulocytes 13.6  H, Absolute Lymphocytes 2.4, 
Absolute Monocytes 1.0  H, Absolute Eosinophils 0, Absolute Basophils 0
 
 
Assessment/Plan
Assessment/Plan
The patient's blood pressure has improved.  I recommend cutting back on her 
blood pressure medication and using PRN doses of hydralazine or amlodipine if it
spikes again.
Continue telemetry? Not applicable

## 2018-03-26 ENCOUNTER — HOSPITAL ENCOUNTER (INPATIENT)
Dept: HOSPITAL 68 - CRI | Age: 58
LOS: 2 days | DRG: 177 | End: 2018-03-28
Attending: INTERNAL MEDICINE | Admitting: INTERNAL MEDICINE
Payer: COMMERCIAL

## 2018-03-26 VITALS — SYSTOLIC BLOOD PRESSURE: 136 MMHG | DIASTOLIC BLOOD PRESSURE: 64 MMHG

## 2018-03-26 VITALS — DIASTOLIC BLOOD PRESSURE: 70 MMHG | SYSTOLIC BLOOD PRESSURE: 124 MMHG

## 2018-03-26 VITALS — DIASTOLIC BLOOD PRESSURE: 64 MMHG | SYSTOLIC BLOOD PRESSURE: 136 MMHG

## 2018-03-26 DIAGNOSIS — N18.6: ICD-10-CM

## 2018-03-26 DIAGNOSIS — G93.40: ICD-10-CM

## 2018-03-26 DIAGNOSIS — I10: ICD-10-CM

## 2018-03-26 DIAGNOSIS — J69.0: Primary | ICD-10-CM

## 2018-03-26 DIAGNOSIS — G47.33: ICD-10-CM

## 2018-03-26 DIAGNOSIS — Z51.5: ICD-10-CM

## 2018-03-26 DIAGNOSIS — N17.9: ICD-10-CM

## 2018-03-26 DIAGNOSIS — E10.22: ICD-10-CM

## 2018-03-26 DIAGNOSIS — R91.1: ICD-10-CM

## 2018-03-26 LAB
ABSOLUTE GRANULOCYTE CT: 12.5 /CUMM (ref 1.4–6.5)
BASOPHILS # BLD: 0 /CUMM (ref 0–0.2)
BASOPHILS NFR BLD: 0.3 % (ref 0–2)
EOSINOPHIL # BLD: 0.2 /CUMM (ref 0–0.7)
EOSINOPHIL NFR BLD: 1.4 % (ref 0–5)
ERYTHROCYTE [DISTWIDTH] IN BLOOD BY AUTOMATED COUNT: 18.5 % (ref 11.5–14.5)
GRANULOCYTES NFR BLD: 72.1 % (ref 42.2–75.2)
HCT VFR BLD CALC: 31.2 % (ref 37–47)
LYMPHOCYTES # BLD: 3.8 /CUMM (ref 1.2–3.4)
MCH RBC QN AUTO: 27.8 PG (ref 27–31)
MCHC RBC AUTO-ENTMCNC: 32.8 G/DL (ref 33–37)
MCV RBC AUTO: 84.8 FL (ref 81–99)
MONOCYTES # BLD: 0.7 /CUMM (ref 0.1–0.6)
PLATELET # BLD: 238 /CUMM (ref 130–400)
PMV BLD AUTO: 8.7 FL (ref 7.4–10.4)
RED BLOOD CELL CT: 3.68 /CUMM (ref 4.2–5.4)
WBC # BLD AUTO: 17.3 /CUMM (ref 4.8–10.8)

## 2018-03-26 NOTE — PN- DIABETES
Assessment/Plan Diabetes
Assessment:
59 y/o female hx of longstanding DM type 1, chronic renal disease due to 
diabetes and chronic interstitual nephritis and was treated with a couse of 
steroid. 
 
Patient was found to be unresponsive with glucose level was in the 20s. Blood 
work in ER showed inappropriately elevated insulin level.  Her severe 
hypoglycemia most likely was due to insulin overdose.
 
She received hydrocortisone 100 mg iv in ER. Patient was intubated. She was on 
D10 w and pressor. Her BP and glucose level improved and D10 w and pressor were 
discontinued.  
 
She is now on hydrocortisone 12.5 mg iv twice a day. In addition, she was put on
Levothyroxine 25 mcg iv daily.
 
According to her insulin pump-- QuVIStronic 630 G-- her basal insulin 36.75 units 
per 24 hours. 
 
The tube feeding was increased to 35 ml/hour.  She is on Levemir 15 units twice 
a day and Novolog coverage every 4 hours. Her FSGs were 282, 269, 202 and 160.
 
Plan:
continue the current insulin regimen for now;
monitor FSGs.
will follow.
 
Subjective
Subjective:
Patent remains intubated.
 
Objective
Last 24 Hrs of Vital Signs/I&O
 Vital Signs
 
 
Date Time Temp Pulse Resp B/P B/P Pulse O2 O2 Flow FiO2
 
     Mean Ox Delivery Rate 
 
03/26 0635  107 20 145/63     
 
03/26 0520         30
 
03/26 0400      98 Ventilator 30% 
 
03/26 0249         30
 
03/26 0011         30
 
03/26 0000 97.4 107 20 124/70  98 Ventilator 30% 
 
03/26 0000      98 Ventilator 30% 
 
03/25 2242         30
 
03/25 2157 97.7 107 20 143/76     
 
03/25 2000      99 Ventilator 30% 
 
03/25 1935         30
 
03/25 1748  104  106/60     
 
03/25 1640         30
 
03/25 1600 97.3 102 20 114/62  98 Ventilator 30% 
 
03/25 1600      98 Ventilator 30% 
 
03/25 1431  106  110/58     
 
03/25 1400         30
 
03/25 1200      96 Ventilator 30% 
 
03/25 1105         30
 
 
 Intake & Output
 
 
 03/26 1600 03/26 0800 03/26 0000
 
Intake Total  385 345
 
Output Total  102 80
 
Balance  283 265
 
    
 
Intake, Tube  295 255
 
Feeding   
 
Intake, Tube  90 90
 
Irrigant   
 
Output, Urine  102 80
 
Patient  177 lb 
 
Weight   
 
 
 
 
Findings
Pertinent Lab/Sid Results:
 Laboratory Tests
 
 
 03/26 03/25
 
 0645 1150
 
Chemistry  
 
  Sodium (137 - 145 mmol/L) Pending 143
 
  Potassium (3.5 - 5.1 mmol/L) Pending 3.7
 
  Chloride (98 - 107 mmol/L) Pending 103
 
  Carbon Dioxide (22 - 30 mmol/L) Pending 24
 
  Anion Gap (5 - 16) Pending 16
 
  BUN (7 - 17 mg/dL) Pending 53  H
 
  Creatinine (0.5 - 1.0 mg/dL) Pending 5.0  H
 
  Estimated GFR (>60 ml/min)  9  L
 
  Glucose (65 - 99 mg/dL) Pending 241  H
 
  Calcium (8.4 - 10.2 mg/dL) Pending 8.6
 
  Phosphorus (2.5 - 4.5 mg/dL) Pending 3.9
 
  Magnesium (1.6 - 2.3 mg/dL) Pending 2.1
 
  Total Bilirubin (0.2 - 1.3 mg/dL) Pending 0.6
 
  AST (14 - 36 U/L) Pending 65  H
 
  ALT (9 - 52 U/L) Pending 41
 
  Albumin (3.5 - 5.0 g/dL) Pending 2.8  L
 
Hematology  
 
  CBC w Diff NO MAN DIFF REQ MAN DIFF ORDERED
 
  WBC (4.8 - 10.8 /CUMM) 17.3  H 19.1  H
 
  RBC (4.20 - 5.40 /CUMM) 3.68  L 3.90  L
 
  Hgb (12.0 - 16.0 G/DL) 10.2  L 10.6  L
 
  Hct (37 - 47 %) 31.2  L 32.8  L
 
  MCV (81.0 - 99.0 FL) 84.8 84.0
 
  MCH (27.0 - 31.0 PG) 27.8 27.1
 
  MCHC (33.0 - 37.0 G/DL) 32.8  L 32.3  L
 
  RDW (11.5 - 14.5 %) 18.5  H 17.9  H
 
  Plt Count (130 - 400 /CUMM) 238 265
 
  MPV (7.4 - 10.4 FL) 8.7 8.6
 
  Gran % (42.2 - 75.2 %) 72.1 78.8  H
 
  Lymphocytes % (20.5 - 51.1 %) 22.1 13.9  L
 
  Monocytes % (1.7 - 9.3 %) 4.1 6.7
 
  Eosinophils % (0 - 5 %) 1.4 0.3
 
  Basophils % (0.0 - 2.0 %) 0.3 0.3
 
  Absolute Granulocytes (1.4 - 6.5 /CUMM) 12.5  H 15.1  H
 
  Segmented Neutrophils (42.2 - 75.2 %)  62
 
  Band Neutrophils (0.0 - 5.0 %)  6  H
 
  Absolute Lymphocytes (1.2 - 3.4 /CUMM) 3.8  H 2.7
 
  Lymphocytes (20.5 - 51.1 %)  21
 
  Monocytes (1.7 - 9.3 %)  4
 
  Absolute Monocytes (0.10 - 0.60 /CUMM) 0.7  H 1.3  H
 
  Eosinophils (0 - 5.0 %)  2
 
  Absolute Eosinophils (0.0 - 0.7 /CUMM) 0.2 0.1
 
  Absolute Basophils (0.0 - 0.2 /CUMM) 0 0
 
  Metamyelocytes (0.0 - 1.0 %)  1
 
  Myelocytes (0 - 0 %)  4  H
 
  Nucleated RBCs (0.0 - 0.0 /100WBC)  2  H
 
  Platelet Estimate (ADEQUATE)  ADEQUATE
 
  Polychromasia  1+
 
  Poikilocytosis  2+
 
  Anisocytosis  1+
 
  Ovalocytes

## 2018-03-26 NOTE — PN- RESIDENT CRCU
Rayo BARLOW,Tono 18 0723:
Subjective
HPI/CRCU Issues:
Overnight issues:
 
Patient's CODE STATUS was changed to DNR/DNI yesterday.  Patient continues to 
remain unresponsive, intubated off sedation.  Patient's family was at bedside 
today and would like to extubate her today.  Dialysis has been held off today.
 
 
Vitals: MAXIMUM TEMPERATURE 98.8, heart rate 100 to 2020, sinus tach, 
respiration rate 20, blood pressure this morning 123/60, ranging from 106-161 
systolic over 50s to 60s diastolic, saturating between 96-98% on mechanical 
ventilation with tidal volume of 500, FiO2 of 30, PEEP of 5
 
Currently on Nepro tube feeds, total intake:6318, output:5718
Accu-Chek: 282, 269, 202,160.
Labs:
WBC 17.3 (down from 19.1), H&H 10.2 and 31.2, platelets 238
BEP pending
 
Chest x-ray: From 3/25
Endotracheal tube and enteric tube in place.
 
Persistent hazy airspace disease within the medial right upper lung zone.
 
 
Objective
 
Vital Signs & I&O
Last 8 Hrs of Vitals and I&O:
 Vital Signs
 
 
Date Time Temp Pulse Resp B/P B/P Pulse O2 O2 Flow FiO2
 
     Mean Ox Delivery Rate 
 
 1332 100.2        
 
 0840         30
 
 0800      98 Ventilator 30% 
 
 0800 100.2 108 20 136/64  98 Ventilator 30% 
 
 0635  107 20 145/63     
 
 0520         30
 
 0400      98 Ventilator 30% 
 
 0249         30
 
 0011         30
 
 0000 97.4 107 20 124/70  98 Ventilator 30% 
 
 0000      98 Ventilator 30% 
 
 2242         30
 
 2157 97.7 107 20 143/76     
 
 2000      99 Ventilator 30% 
 
 1935         30
 
 1748  104  106/60     
 
 1640         30
 
 1600 97.3 102 20 114/62  98 Ventilator 30% 
 
 1600      98 Ventilator 30% 
 
 1431  106  110/58     
 
 
 Intake & Output
 
 
  1600  0800  0000
 
Intake Total  385 345
 
Output Total  102 80
 
Balance  283 265
 
    
 
Intake, Tube  295 255
 
Feeding   
 
Intake, Tube  90 90
 
Irrigant   
 
Output, Urine  102 80
 
Patient  177 lb 
 
Weight   
 
 
 
 
Exam
General Appearance: intubated, unresponsive, with spontaneous movement of legs 
Head: atraumatic, normal appearance
Ears, Nose, Throat: lips were slightly swollen over the endotracheal tube, dry 
lips
Cardiovascular: regular rate/rhythm
Gastrointestinal: normal bowel sounds, soft, non-tender
Extremities: upper extremity bilateral edema
Cranial Nerves: PERRL, unresponsive on intubation off sedation
Current Medications:
 Current Medications
 
 
  Sig/Susan Start time  Last
 
Medication Dose Route Stop Time Status Admin
 
Acetaminophen 1,000 MG Q6P PRN  1330 DCD 
 
N/A 1 UNIT IV   1332
 
Acetaminophen 1,000 MG Q6P PRN  1445 DC 
 
  IV   0946
 
Amlodipine Besylate 2.5 MG DAILY  1815 DC 
 
  PO   1903
 
Artificial Tears 2 GTT 4 TIMES/DAY  1400 DCD 
 
  OPH   1322
 
Aspirin 81 MG DAILY  1000 DC 
 
  PO   0934
 
Atorvastatin Calcium 40 MG 1700  1700 DC 
 
  PO   1742
 
Epoetin Clark 8,000 UNIT  ..  1300 DC 
 
  IV   
 
Hydralazine HCl 5 MG Q8  1400 DC 
 
  IV   
 
Insulin Aspart 0 Q4H  1200 DC 
 
  SC   0854
 
Insulin Detemir 15 UNITS BID  2200 DC 
 
  SC   0854
 
Levothyroxine Sodium 25 MCG DAILY  1000 DC 
 
  IV   0933
 
Lorazepam 1 MG Q4P PRN  1400 DCD 
 
  IV   
 
Morphine Sulfate 6 MG ONCE ONE  1130 DC 
 
  IV  1131  1142
 
Morphine Sulfate 100 MG Q24H  1130 DCD 
 
Dextrose/Water 100 ML IV   1228
 
Nitroglycerin 0.5 GM Q6  1200 DC 
 
  TOP   0613
 
Pantoprazole Sodium 40 MG DAILY  1730 DC 
 
  IV   0933
 
Scopolamine HBr 1 PAT Q72H PRN  1600 DCD 
 
  TOP   1142
 
 
 
 
Impression/Plan
 
Impression/Problem List
Impression:
This is 58-year-old female with a medical history of stage 5 CKD(nephrotic 
proteinuria, diabetic nephropathy and retinopathy) with left AV fistula placed 
couple month ago(still making urine), currently not on hemodialysis, has only 
one kidney since childhood.  Chronic anemia, hypertension, type 1 diabetes on 
insulin pump, hypothyroidism, ankylosing spondylitis on chronic prednisone, 
depression, GERD, right lung mass according to the family the biopsy came back 
as a benign, hyperparathyroidism due to renal insufficiency, chronic pain.  
Presented to the emergency department via EMS due to unresponsiveness.
 
Patient is admitted to the ICU for management of the following: 
 
Patient shows no improvement in her neurological status. Patient's prognosis 
remains poor. Patient was extubated today and patient's care was deescalated to 
hospice care today after family discussion. 
 
 
Respiratory: 
 
Acute hypoxic respiratory failure 2/2 to aspiration pneumonia on mechanical 
ventilation: 
Patient continues to remain on mechanical ventilation. Oxygen requirement 
continues to improve with decreased FiO2. Patient's remains unresponsive off 
sedation. 
 
Extubated today. 
Oxygen supplementation with NC
IV Morphine
Plan per hospice care
 
Infection: 
Aspiration Pneumonia: 
Patient continues to be tachycardic, febrile with elevated leukocytosis which is
downtrending on IV Unasyn.  No growth on cultures thus far.  Repeat chest xray 
shows persistent bilateral airspace opacities with largest area of consolidation
in the right upper lobe. Patient remains on ventilatory support. CT Chest 
showing lobulated mass in the right upper lobe medially is not significantly 
changed in size. There are now patchy areas of ground glass opacification 
nodularity in both lungs, which may be consistent with multifocal pneumonic 
infiltrates.
 
 
Cardiac:
Status post cardiac arrest in the ED
New right bundle branch block, wide QRS tachycardia
NSTEMI- likely 2/2 to demand ischemia
Patient's troponin's peaked to 4.20. Patient remains unresponsive on mechanical 
ventilator. Patient's heparin was temporarily stopped on 3/20 due to concerns of
bleeding from pérez site. Patient was seen by cardiology. H/H was stable and 
patient's heparin was shortly after on 3/20. Off IV heparin. Patient's H/H 
stable. 
 
 
Heme: 
 
Anemia
Patient has a history of chronic anemia. H/H dropped to 7.6 this morning. 
Patient had pérez placement in the OR today with continued bleeding which is 
expected per urology. Patient is also having guiac positive stools. Repeat H/H 
after was 7.1. Patient s/p 1 unit pRBC with dialysis on 3/22. Repeat H/H was >9 
this morning.  
 
 
 
Metabolic: 
 
Type 1 Diabetes - on Insulin Pump
Patient initially presented with hypoglycemia w/ BG of 27. Patients BG over the 
past 24 hours were in the 200s. Patient is on levemir and insulin SS per 
endocrinology recommendations. 
 
 
Adrenal Insufficiency. Patient is on chronic prednisone for interstitital 
nephritis. Patien was on IV hydrocortisone 12.5mg IV BID. 
 
Metabolic acidosis with elevated anion gap and lactic acidosis - Resolved
Patient's anion gap is down from previous day. Lactic acid was previously 
fluctuating however is now downtrending. Patient was previously getting IV 
Bicarb. 
 
Alimentary:
 
Discontinue tube feeds per hopice. 
 
Neurology:
 
Unresponsiveness - likely multifactorial at this point. Initially secondary to 
severe hypoglycemia. Patient likely had a seizure prior to arrival as she was 
found to have urinary and bladder incontinence and had an elevated prolactin 
level on admission. Patient was also found to have vomitted and aspirated. In 
the ED on day of admision patient had a cardiac arrest for approximately 10 
minutes. Patient had a head CT on admission which was nondiagnostic due to 
motion artifact. Repeat CT on 3/21 showed extensive loss of gray-white matter 
differentiation primarily involving the basal ganglia, right insular cortex, and
both temporal lobes. These findings are consistent with hypoxic ischemic injury.
No acute hemorrhage.
 
 
Nephrology:
 
ESRD w/left sided AV fistula: 
Patient has a history of ESRD with biopsy showing chronic intersitial nephritis 
and diabetic nephropathy. Patient was placed on a course of steroids which were 
tapered off. Patient has received multiple dialysis sessions. Patient's dialysis
today was held today. 
 
No more dialysis. Patient converted to hospice care. 
 
 
Urology: 
Urethral stricture. Nursing was unable to placed pérez. Urology was consulted 
and placed a 16 Kisw[place]ili pérez after urethral dilation. Patient's pérez appears to
be obstructed today with what appears to be clotted frothy blood in the bag and 
area around the pérez with bleeding and showing leakage. Irrigation on 3/20 did 
not improve the patency of the pérez. The pérez was removed in the morning on 3/
21 by urology. Patient's PVR >500 on 3/21 and Dr. Garvin attempted to place a
pérez at the bedside however was unable to. Patient was taken to the OR on 3/22 
for cystoscopy with successful pérez placement. Cystoscopy showed false urethral
passage. Patient drained 800cc immediateley after pérez placement. 
 
 
DVT PPx: 
ALPs only 
 
Code: 
Full code 
 
Dispo: 
Patient converted to hospice care today. 
Problem List:
 1. Hypoxic brain injury
 
 2. Hypoglycemia
 
Pain Ratin
Tomorrow's Labs & Rationales:
none 
 
Plan
DVT/Prophylaxis: mechanical, pharmacological
 
 
Mike Haddad MD 18 1136:
Attending MD Review Statement
 
Attending Sign Off
Attending Cosign Statement:
I have: examined this patient, reviewed avalbl EMR data, personally reviewd 
images, discussd w/resident/PA/NP, discussed mgmt plan w/josé, discussed mgmt 
plan w/CM, discussed mgmt plan w/pt, agreed w/resident/PA/NP, amended to note. 
Other Findings:
IMike M.D. have examined this patient, reviewed available EMR data, 
personally reviewed images, discussed with resident/PA/NP, discussed management 
plan with housestaff and nursing staff, discussed managment plan all of 
healthcare providers, discussed management plan with patient and/or family, 
agreed with resident/PA/NP.
The past history and parts of the chart have been autopopulated.
 
Impression
58 year old woman
 
 s/p asystolic arrest, likely/presumed secondary to insulin overdose resulting 
in hypolglycemia
 CKD
 anoxic brain injury
 hypoxemic respiratory failure
 lung nodule s/p non diagnostic biopsy
 leukocytosis with likely aspiration
 
Plan
Per the family we will convert to comfort measures extubate and administer 
morphine

## 2018-03-26 NOTE — PN- NEUROLOGY
Subjective
Subjective:
Remains comatose, intubated
 
Objective
Vital Signs and I&Os
Vital Signs
 
 
Date Time Temp Pulse Resp B/P B/P Pulse O2 O2 Flow FiO2
 
     Mean Ox Delivery Rate 
 
03/26 0840         30
 
03/26 0635  107 20 145/63     
 
03/26 0520         30
 
03/26 0400      98 Ventilator 30% 
 
03/26 0249         30
 
03/26 0011         30
 
03/26 0000 97.4 107 20 124/70  98 Ventilator 30% 
 
03/26 0000      98 Ventilator 30% 
 
03/25 2242         30
 
03/25 2157 97.7 107 20 143/76     
 
03/25 2000      99 Ventilator 30% 
 
03/25 1935         30
 
03/25 1748  104  106/60     
 
03/25 1640         30
 
03/25 1600 97.3 102 20 114/62  98 Ventilator 30% 
 
03/25 1600      98 Ventilator 30% 
 
03/25 1431  106  110/58     
 
03/25 1400         30
 
03/25 1200      96 Ventilator 30% 
 
03/25 1105         30
 
 
 Intake & Output
 
 
 03/26 1600 03/26 0800 03/26 0000 03/25 1600 03/25 0800 03/25 0000
 
Intake Total  385 345 440 220 309
 
Output Total  102 80 45 49 1558
 
Balance  283 265 
 
       
 
Intake, IV    150  
 
Intake, Tube  295 255 200 190 219
 
Feeding      
 
Intake, Tube  90 90 90 30 90
 
Irrigant      
 
Output,      1500
 
Dialysate      
 
Output, Urine  102 80 45 49 58
 
Patient  177 lb   175 lb 174 lb
 
Weight      
 
Weight     Bed scale 
 
Measurement      
 
Method      
 
 
 
Physical Exam:
Comatose
Eyes closed
Pupils round sluggishly reactive to light
Roving eye movements
Intact oculocephalics
Intact corneals
Intact gag
No response to sternal rub
With nailbed pressure to the upper extremities, she displays decorticate 
posturing 
With nailbed pressure to the lower extremities, she displays triple flexion
Current Medications:
 Current Medications
 
 
  Sig/Susan Start time  Last
 
Medication Dose Route Stop Time Status Admin
 
Acetaminophen 1,000 MG Q6P PRN 03/19 1445 AC 03/25
 
  IV   0946
 
Amlodipine Besylate 2.5 MG DAILY 03/24 1815 AC 03/24
 
  PO   1903
 
Artificial Tears 2 GTT 4 TIMES/DAY 03/23 1400 AC 03/25
 
  OPH   2156
 
Aspirin 81 MG DAILY 03/21 1000 AC 03/26
 
  PO   0934
 
Atorvastatin Calcium 40 MG 1700 03/21 1700 AC 03/25
 
  PO   1742
 
Epoetin Clark 8,000 UNIT MONDAY WED FRIDAY .. 03/24 1300 AC 
 
  IV   
 
Hydralazine HCl 5 MG Q8 03/25 1400 AC 
 
  IV   
 
Hydralazine HCl 10 MG Q8 03/24 1400 DC 03/25
 
  IV   0616
 
Hydrocortisone  12.5 MG BID 03/23 1000 DC 03/25
 
Sodium Succinate  IV   0938
 
Insulin Aspart 0 Q4H 03/23 1200 AC 03/26
 
  SC   0854
 
Insulin Detemir 15 UNITS BID 03/25 2200 AC 03/26
 
  SC   0854
 
Insulin Detemir 11 UNITS BID 03/24 2200 DC 03/25
 
  SC   0946
 
Levothyroxine Sodium 25 MCG DAILY 03/20 1000 AC 03/26
 
  IV   0933
 
Lorazepam 1 MG Q4P PRN 03/20 1400 AC 
 
  IV   
 
Nitroglycerin 0.5 GM Q6 03/24 1200 AC 03/25
 
  TOP   0613
 
Pantoprazole Sodium 40 MG DAILY 03/19 1730 AC 03/26
 
  IV   0933
 
Scopolamine HBr 1 PAT Q72H PRN 03/20 1600 AC 03/20
 
  TOP   1640
 
 
 
 
Assessment/Plan
Assessment:
Severe encephalopathy, multifactorial, in the setting of presumed prolonged 
hypoglycemia, then cardiac arrest.  No improvement in cortical function on 
serial exams over the past week.  Diffuse cerebral edema on head CT 3 days ago. 
Intact brainstem functions.
Plan:
Very poor prognosis for neurologic recovery
Goals of care discussions as per the ICU team.
Please reconsult if we can be of further assist

## 2018-03-26 NOTE — RADIOLOGY REPORT
EXAMINATION:
XR PORTABLE CHEST
 
CLINICAL INFORMATION:
Intubated.
 
COMPARISON:
03/25/2018
 
TECHNIQUE:
Portable frontal view of the chest was obtained.
 
FINDINGS:
Cardiomediastinal silhouette is within normal limits. Endotracheal tube again
noted with the tip at the level of the thoracic inlet approximately 6.7 cm
above the kaushal. Enteric tube is in place. Low lung volumes. Persistent mild
hazy opacification again seen within the medial right upper lung zone. Lungs
otherwise appear clear. No pleural effusion or pneumothorax. No acute osseous
abnormality.
 
IMPRESSION:
1. Stable positioning of the endotracheal and enteric tubes.
2. Persistent hazy airspace disease within the medial right upper lung zone.

## 2018-03-26 NOTE — PN- CARDIOLOGY
Subjective
Subjective:
* Patient remains comatose.
* sinus rhythm
* improved blood pressure
 
Objective
Vital Signs and I&Os
Vital Signs
 
 
Date Time Temp Pulse Resp B/P B/P Pulse O2 O2 Flow FiO2
 
     Mean Ox Delivery Rate 
 
03/26 1332 100.2        
 
03/26 0840         30
 
03/26 0800      98 Ventilator 30% 
 
03/26 0800 100.2 108 20 136/64  98 Ventilator 30% 
 
03/26 0635  107 20 145/63     
 
03/26 0520         30
 
03/26 0400      98 Ventilator 30% 
 
03/26 0249         30
 
03/26 0011         30
 
03/26 0000 97.4 107 20 124/70  98 Ventilator 30% 
 
03/26 0000      98 Ventilator 30% 
 
03/25 2242         30
 
03/25 2157 97.7 107 20 143/76     
 
 
 Intake & Output
 
 
 03/26 1600 03/26 0800 03/26 0000 03/25 1600 03/25 0800 03/25 0000
 
Intake Total  385 345 440 220 309
 
Output Total  102 80 45 49 1558
 
Balance  283 265 395 171 -1249
 
       
 
Intake, IV    150  
 
Intake, Tube  295 255 200 190 219
 
Feeding      
 
Intake, Tube  90 90 90 30 90
 
Irrigant      
 
Output,      1500
 
Dialysate      
 
Output, Urine  102 80 45 49 58
 
Patient  177 lb   175 lb 174 lb
 
Weight      
 
Weight     Bed scale 
 
Measurement      
 
Method      
 
 
 
Physical Exam:
General: WD/overweight female. Intubated.
HEENT: NC/AT, pupils reactive to light, no dolls eyes
Neck: no JVD, no carotid bruit
Heart: RRR w/o murmur
Lungs: clear bilaterally
Abdomen: soft, NT, +ve bowel sounds
Extremities: no edema
Neuro: some spontaneous movement of legs bilaterally
 
 
Assessment/Plan
Assessment/Plan
* Patient's blood pressure is improved. Would not give medications that are 
negative chronotropic agents due to previous severe bradycardia. Continue 
hydralazine as needed for control of blood pressure. Continue NTG paste 1/2 inch
Q 6 hours. Discussion with family is planned to consider comfort measures.
Continue telemetry? Not applicable

## 2018-03-26 NOTE — EVENT NOTE
Event Note
Event Note:
Family meeting with patients  occured between housestaff and attending 
Dr. Mike Haddad. It was decided to change patients code status to comfort 
measures and Morphine was started.

## 2018-03-26 NOTE — DISCHARGE SUMMARY
Visit Information
 
Visit Dates
Admission Date:
03/19/18
 
Discharge Date:
03/26/18
 
 
Hospital Course
 
Course
Attending Physician:
Mike Haddad MD
 
Primary Care Physician:
Prem Stoddard MD
 
Consulting Request: 1 
   Consulting Specialty: Cardiology
   Consulting Physician:
Dr. Arias
Consulting Request: 2 
   Consulting Specialty: Critical Care
   Consulting Physician:
Dr. Haddad
Consulting Request: 3 
   Consulting Specialty: Endocrinology
   Consulting Physician:
Dr. Olmedo
Consulting Request: 4 
   Consulting Specialty: Infectious Disease
   Consulting Physician:
Dr. Lin
Consulting Request: 5 
   Consulting Specialty: Nephrology
   Consulting Physician:
Dr. Denis 
Consulting Request: 6 
   Consulting Specialty: Neurology
   Consulting Physician:
Dr. Ventura
Consulting Request: 7 
   Consulting Specialty: Urology
   Consulting Physician:
Dr. Garvin
[place] [facility] Course:
This is 58-year-old female with a medical history of stage 5 CKD(nephrotic 
proteinuria, diabetic nephropathy and retinopathy) with left AV fistula placed 
couple month ago(still making urine), currently not on hemodialysis, has only 
one kidney since childhood.  Chronic anemia, hypertension, type 1 diabetes on 
insulin pump, hypothyroidism, ankylosing spondylitis on chronic prednisone, 
depression, GERD, right lung mass according to the family the biopsy came back 
as a benign, hyperparathyroidism due to renal insufficiency, chronic pain.  
Presented to the emergency department via EMS due to unresponsiveness with the 
accucheck of 27.
 
Patient was admitted to the ICU for management of the following: 
 
Problems: 
1. Acute hypoxic respiratory failure 2/2 to aspiration requiring mechanical 
ventilation 
2. Encephalopathy 2/2 Anoxic brain injury
3. Status post cardiac arrest 
4. NSTEMI, likely Type II MI 
5. Severe hypoglycemia on admission 
6. Aspiration Pneumonia 
7. End Stage Renal Disease, w/ AV Fistula 
8. Adrenal Insufficiency
9. Insulin Dependent Diabetes Mellitus
10. Anemia of chronic disease 
11. Urethral stricture, s/p cystoscopy and pérez placement by urology 
12. New RBBB
 
Presentation and ED course:
 
The patient presented to the ED unresponsive after patient's  found her 
at 7 AM on day of admission unresponsive with vomit covering her face and 
noticed spell and urinary incontinence.  EMS was called and patient was brought 
to the hospital where she was found to have a blood sugar of 27.  Patient 
received D10 on the way to the hospital.  Patient initially was placed on BiPAP 
due to metabolic acidosis seen on arterial blood gas.  Patient was started on an
IV bicarbonate drip per nephrology with no need for emergent dialysis.  Patient 
remained unresponsive with elevated troponins and no significant EKG changes.  
Patient was given rectal aspirin and cardiology was notified and patient was 
started on IV heparin drip.  Chest x-ray showed patchy opacity at the left base 
suggestive of developing consolidation as well as a stable mass in the right 
upper lobe.  Patient received IV ceftriaxone, IV azithromycin and was started on
IV D5 half-normal saline.  Patient had nondiagnostic head CT due to significant 
streak and motion artifact.  Due to hypotension patient was started on high-dose
steroids IV.  Patient had central line placed in the right femoral while in the 
ED.
 
Cardiology, infectious disease, nephrology, and endocrinology were all 
consulted.
 
Patient was intubated due to inability to protect her airway due to 
unresponsiveness in the ED.  After which patient became bradycardic, a pacer and
atropine were verbally ordered however before they could be administered patient
went into asystole and patient underwent successful CPR with atropine, 
epinephrine, bicarbonate with initiation of a dopamine drip and levophed drip 
for hypotension.  Status post cardiac arrest patient had an episode of V. tach 
and was given a dose of amiodarone 150 mg.  Patient's troponins continued to 
rise.
 
In the ICU:
Encephalopathy
Likely multifactorial. Initially secondary to severe hypoglycemia. Patient 
likely had a seizure prior to arrival as she was found to have urinary and 
bladder incontinence and had an elevated prolactin level on admission. Patient 
was also found to have vomitted and aspirated. In the ED on day of admision 
patient had a cardiac arrest for approximately 10 minutes. Patient had a head CT
on admission which was nondiagnostic due to motion artifact. Repeat CT on 3/21 
showed extensive loss of gray-white matter differentiation primarily involving 
the basal ganglia, right insular cortex, and both temporal lobes. These findings
are consistent with hypoxic ischemic injury. Patient remained unresponsive off 
sedation. Patient was evaluated by neurology. Patient's care was de-escalated on
3/25 to DNR/DNI and on 3/26 was changed to comfort care. 
 
Acute hypoxic respiratory failure 2/2 to aspiration pneumonia 
Patient remained on mechanical ventilation throughtout the duration of her ICU 
stay. Her oxygen saturation improved however due to continued unresponsiveness 
and the inability to protect her airway, mechanical ventilation was continued 
until 3/26. Due to patient's poor neuroligical state 2/2 to anoxic brain injury 
ongoing discussion with the family led the decision to extubate patient today 
and convert to comfort care and discharged to hospice today. 
 
Aspiration Pneumonia
Patient presented unresponsive with aspiration of bile contents prior to 
presentation. Patient's CXR showed area of left base consolidation. Patient had 
a CT Chest showing a lobulated mass in the right upper lobe medially is not 
significantly changed in size. There are now patchy areas of ground glass 
opacification nodularity in both lungs, which may be consistent with multifocal 
pneumonic infiltrates. Patient was started on continued on IV antibiotics for 5 
days. 
 
Status post cardiac arrest in the ED
New right bundle branch block, wide QRS tachycardia
NSTEMI- likely 2/2 to demand ischemia
Patient's troponin's peaked to 4.20. Patient was continued on IV Heparin which 
was temporarily shut of on 3/20 and then on 3/22 was discontinued completely due
to concerns of bleeding. 
 
Chronic Anemia: 
Patient has a history of chronic anemia. Patient was found to have bleeding from
initial pérez insertion with a drop in her H/H requiring 1 pRBC which she 
received with her first round of dialysis on 3/22. Patient's H/H remained stable
with no further transfusions required. Patient received EPO with her dialysis 
sessions.  
 
Severe hypoglycemia 
Insulin Dependent Diabetes Mellitus - on Insulin Pump
Patient initially presented with hypoglycemia w/ BG of 27. Patient's insulin 
level on presentation was elevated. Endocrinology was consulted. Patient 
recieved D10 and D51/2 NS. Patient's sugars improved and was started on long and
short acting insulin per endocrinology recommendations. 
 
Adrenal Insufficiency 
Patient is on chronic prednisone for interstitital nephritis. On presentation 
patient became hypotensive and was given stress dose steroids. Patient was 
maintained on hydrocortisone IV. 
 
Metabolic acidosis with elevated anion gap and lactic acidosis - Resolved
Patient initially presented with metabolic acidosis with elevated anion gap and 
elevated lactic acidosis in the setting of hypoglycemia, hypotension and anoxic 
brain injury. Patient received IV fluids, IV bicarb drip and mechanical 
ventilation with  resolution of the acidosis. 
 
 
ESRD w/left sided AV fistula: 
Patient has a history of ESRD with biopsy showing chronic intersitial nephritis 
and diabetic nephropathy. Patient was seen by nephrology and her first dialysis 
was performed on 3/22. Patient has received multiple dialysis sessions. Patient'
s dialysis today was held on 3/26 at the request of the family. Patient was 
converted to hospice care. No further dialysis sessions. 
 
 
Urethral stricture s/p cystoscopy and pérez placement in the OR by urology 
Nursing was unable to placed pérez. Urology was consulted and placed a 16 Romanian
pérez after urethral dilation. Patient's pérez appears to be obstructed on 3/20 
with no improvement with irrigation. Pérez was removed however patient started 
to have urinary retention. Patient was taken to the OR on 3/22 for cystoscopy 
with successful pérez placement. Cystoscopy showed false urethral passage. 
Patient drained 800cc immediateley after pérez placement. 
 
 
 
Allergies:
Uncoded Allergies:
all pain medications (NAUSEA 10/14/14)
 
Significant Procedures:
PATIENT CARE UNIT CONFIDENTIAL COPY
Operative/Inv Procedure Report
Surgery Date: 03/22/18
Name of Procedure:
Cystoscopy and insertion of pérez catheter
Pre-Operative Diagnosis:
Urinary retention, unable to place pérez at bedside
Post-Operative Diagnosis:
Same.  Also in mid urethra a false passage ventrally
Estimated Blood Loss: haja
Surgeon/Assistant:
Virgie
 
Anesthesia: general endotracheal tube
Drains:
18 fr  tip pérez catheter
Specimens:
none
Complications:
none
Condition:
critical
Operative Indication:
Critically ill patient in urinary retention and inability to place pérez at 
bedside
 
Operative/Procedure Note
Note:
The patient was taken to the operating room and identified.  She was placed on 
the operating table in the supine position.  Timeout was executed appropriately.
 Patient was already intubated.  Anesthesia was given.  She was then placed in 
the dorsal lithotomy position.  Bimanual exam revealed a palpable lower 
abdominal mass which was felt to be the bladder.  The uterus and cervix were not
palpable.  She was prepped and draped in usual fashion for cystoscopy.  Using 
the 22 Cambodian cystoscope with 30 lens urethroscopy was performed.  The very 
distal urethra was normal.  There was a sizable false passage of the urethra in 
the mid urethra ventrally.  Urethroscopy revealed that the true urethra was much
more superiorly located.  Therefore by angling the cystoscope toward the ceiling
the urethra could be followed into the bladder.  The bladder was noted to be 
distended.  It was otherwise grossly normal.  A guidewire was placed through the
cystoscope and the cystoscope removed leaving the wire in place.  Over the wire 
an 18 [place] tip catheter was placed.  10 mL was placed in the balloon. 
A large amount of urine drained from the bladder, proximally 800 mL after which 
are palpable lower abdominal mass resolved.  Patient tolerated the procedure 
reasonably well as completion was taken back to the intensive care unit in 
critical condition
Findings:
Distended bladder.  Elevated bladder neck from previous surgery.  Urethral false
passage and mid urethral ventrally
 
 
Pertinent Lab Results:
  SERVICE DATE: 03/19/
EXAM TYPE: RAD - XRY-PORTABLE CHEST XRAY
 
EXAMINATION:
XR PORTABLE CHEST
 
CLINICAL INFORMATION:
Nausea vomiting, hypoxia, rales. History of end-stage renal disease.
 
COMPARISON:
Chest x-ray 11/16/2017 and CT scan of the chest 01/19/2018.
 
TECHNIQUE:
Portable frontal view of the chest was obtained.
 
FINDINGS:
The lung fields are well-expanded bilaterally. The study redemonstrates a
mass in the right superior lung field medially, which is not significantly
changed compared to prior imaging. There is a new area of patchy opacity at
the left base, which may be consistent with developing consolidation. The
cardiac silhouette is normal. The aortic arch is unfolded. There is a small
right-sided pleural effusion. There are no acute osseous findings.
 
IMPRESSION:
1. There is a stable mass in the right upper lobe, seen on prior imaging.
 
2. Patchy opacification at the left base may be consistent with developing
consolidation.
 
 
  SERVICE DATE: 03/19/
EXAM TYPE: CAT - CT HEAD WO IV CONTRAST
 
EXAMINATION:
CT HEAD WITHOUT CONTRAST
 
CLINICAL INFORMATION:
Unresponsive the rotatory nystagmus.
 
COMPARISON:
None available.
 
TECHNIQUE:
Contiguous axial imaging was performed from the skull base to vertex without
intravenous administration of contrast.
 
FINDINGS:
This examination is nondiagnostic secondary to significant streak artifact
and patient motion. I cannot exclude infarcts nor for the presence of
intracranial hemorrhage on this exam. There is no hydrocephalus nor midline
shift. No definite significant soft tissue findings. No acute osseous
findings. Trace fluid levels within the maxillary sinuses bilaterally.
Partial ethmoid air cell opacification bilaterally. Small fluid level within
the left sphenoid sinus. Partially imaged nasogastric tube.
 
IMPRESSION:
Nondiagnostic CT of the head secondary to significant streak/motion artifact
despite repeating the acquisition. I cannot exclude infarcts nor hemorrhage
on this study.
 
  SERVICE DATE: 03/19/
EXAM TYPE: CARD - ECHOCARDIOGRAM
 
 Left Ventricle 
 Normal left ventricular size, wall thickness and systolic function  
 with severe hypokinesis of the apex, mid to distal inferior and  
 distal anteroseptal wall.   The ejection fraction is visually  
 estimated at 30%. 
 
 Right Ventricle 
 The right ventricle is normal in size and function. 
 
 Right Atrium 
 The right atrium is normal in size. 
 
 Left Atrium 
 The left atrium is normal in size.  The interatrial septum is  
 intact. 
 
 Mitral Valve 
 The mitral valve demonstrates mild annuar calcification with normal  
 function.  There is trace mitral regurgitation. 
 
 Aortic Valve 
 Structurally normal aortic valve without significant sclerosis or  
 stenosis.  There is no aortic regurgitation. 
 
 Tricuspid Valve 
 The tricuspid valve is normal in structure and function.  There is  
 trace tricuspid regurgitation.  Pulmonary artery systolic pressure  
 is normal. 
 
 Pulmonic Valve 
 Structurally normal pulmonic valve.  There is no pulmonic  
 regurgitation. 
 
 Pericardium 
 Normal pericardium without effusion.  No pleural effusion. 
 
 Great Vessels 
 Normal aortic root dimension.  The aortic arch and great vessels are  
 well seen and are normal. 
 
 CONCLUSIONS 
 1. Moderately decreased EF of 30% with apical severe hypokinesis  
 consistent with Takasubo cardiomyopathy. 
 2. Trace mitral regurgitation. 
 3. Trace tricuspid regurgitation. 
 
 Silviano Arias M.D. 
 (Electronically Signed) 
 Final Date:      20 March 2018  
                  10:06 
 
 MEASUREMENTS  (Male / Female) Normal Values 
 
 2D ECHO 
 LV Diastolic Diameter PLAX        4.4 cm                4.2 - 5.9 / 3.9 - 5.3 
cm 
 LV Systolic Diameter PLAX         2.9 cm                2.1 - 4.0 cm 
 LV Fractional Shortening PLAX     34.1 %                25 - 46  % 
 LV Ejection Fraction 2D Teich     63.3 %                 
 IVS Diastolic Thickness           1.0 cm                 
 LVPW Diastolic Thickness          1.1 cm                 
 LV Relative Wall Thickness        0.5                    
 RV Internal Dim ED PLAX           1.9 cm                1.9 - 3.8 cm 
 LVOT Diameter                     1.8 cm                 
 Aortic Root Diameter              2.4 cm                 
 LA Systolic Diameter LX           2.7 cm                3.0 - 4.0 / 2.7 - 3.8 
cm 
 LA Volume                         19.0 cm              18 - 58 / 22 - 52 cm 
 Ascending Aorta Diameter          2.7 cm                 
 
 DOPPLER 
 AV Peak Velocity                  157.0 cm/s             
 AV Peak Gradient                  9.9 mmHg               
 AV Mean Velocity                  103.0 cm/s             
 AV Mean Gradient                  5.0 mmHg               
 AV Velocity Time Integral         21.6 cm                
 LVOT Peak Velocity                104.0 cm/s             
 LVOT Peak Gradient                4.3 mmHg               
 LVOT Mean Velocity                76.2 cm/s              
 LVOT Mean Gradient                3.0 mmHg               
 LVOT Velocity Time Integral       16.9 cm                
 LVOT Stroke Volume                43.0 cm               
 AV Area Cont Eq vti               2.0 cm                
 AV Area Cont Eq pk                1.7 cm                
 MV Peak Velocity                  170.5 cm/s             
 MV Peak Gradient                  11.6 mmHg              
 MV Mean Velocity                  90.1 cm/s              
 MV Mean Gradient                  4.0 mmHg               
 Mitral E Point Velocity           132.0 cm/s             
 MV PHT Velocity                   170.0 cm/s             
 MV Deceleration [place]             1269.0 cm/s           
 MV Pressure Half Time             40.2 ms                
 MV Area PHT                       5.5 cm                
 MV Deceleration Time              148.0 ms               
 TR Peak Velocity                  141.0 cm/s             
 TR Peak Gradient                  8.0 mmHg               
 Right Atrial Pressure             10.0 mmHg              
 Pulmonary Artery Systolic Pressu  18.0 mmHg              
 Right Ventricular Systolic Press  18.0 mmHg              
 LV E' Lateral Velocity            17.4 cm/s              
 Mitral E to LV E' Lateral Ratio   7.6                    
 LV E' Septal Velocity             16.9 cm/s              
 Mitral E to LV E' Septal Ratio    7.8            
 
  SERVICE DATE: 03/21/
EXAM TYPE: CAT - CT CHEST WO IV CONTRAST
 
EXAMINATION:
CT CHEST WITHOUT CONTRAST
 
CLINICAL INFORMATION:
Lung mass.
 
COMPARISON:
Chest x-rays earlier 3/21/2018 and 3/20/2018. CT scan of the chest 01/19/2018.
 
TECHNIQUE:
Multidetector volumetric CT imaging of the chest was done. Axial MIP volume
rendering provided. Sagittal and coronal reformatted images were obtained.
 
DLP:
485.4 mGy-cm
 
FINDINGS:
 
: There are partially visualized enteric and endotracheal tubes. There
is hazy opacity in the right upper zone medially.
 
LUNGS: The study redemonstrates the lobulated relatively well-defined mass in
the right upper lobe medially abutting the superior mediastinum which
measures 3.9 x 2.5 cm which is not significantly changed compared to prior
imaging taking differences in slice selection into account. There are now
multiple new patchy areas of groundglass opacification with some nodularity
in the right upper lobe posteriorly, in the superior segments of the lower
lobes bilaterally with areas of more confluent opacification in the left
lower lobe.
 
MEDIASTINUM: The central airways are patent. The thyroid gland has
heterogenous density with an area of low attenuation in the left lobe. There
is no hilar or mediastinal lymphadenopathy. As described above there are
endotracheal and enteric tubes in position. The heart is normal in size.
There is no pericardial effusion. There are mild atheromatous calcifications
of the coronary arteries.
 
PLEURA: There is no pleural effusion. No pleural mass or thickening.
 
AXILLA: There is no axillary lymphadenopathy. There are no masses in the
chest wall. There are areas of edema in the lateral chest walls bilaterally.
 
 
UPPER ABDOMEN: The patient is status post cholecystectomy. The visualized
right kidney appears atrophic. The adrenal glands are not enlarged.
 
OSSEOUS STRUCTURES: There are multilevel spondylitic changes in the thoracic
spine with narrowing of intervertebral disc height and marginal osteophytes.
 
IMPRESSION:
1. The lobulated mass in the right upper lobe medially is not significantly
changed in size.
 
2. There are now patchy areas of ground glass opacification nodularity in
both lungs, which may be consistent with multifocal pneumonic infiltrates.
 
  SERVICE DATE: 03/21/
EXAM TYPE: CAT - CT HEAD WO IV CONTRAST
 
EXAMINATION:
CT HEAD WITHOUT CONTRAST
 
CLINICAL INFORMATION:
Unresponsiveness. Evaluate for hemorrhage or hypoxic brain injury. Stroke.
 
COMPARISON:
CT scan of the head 03/19/2018.
 
TECHNIQUE:
Contiguous axial imaging was performed from the skull base to vertex without
intravenous administration of contrast.
 
DLP:
610.91 mGy-cm
 
FINDINGS:
There is relatively extensive loss of gray-white matter differentiation
primarily involving the basal ganglia, right insular cortex, and both
temporal lobes. There is some preservation of gray-white matter
differentiation for instance within the interhemispheric fissure near the
vertex, both frontal lobes, and left insular cortex. Cytotoxic edema causes
regional sulcal effacement. No midline shift or hydrocephalus. There is no
acute intracranial hemorrhage and no abnormal extra-axial collection. The
calvarium and skull base are intact. There is a trace right mastoid tip
effusion. Moderate to severe paranasal sinus disease.
 
IMPRESSION:
There is relatively extensive loss of gray-white matter differentiation
primarily involving the basal ganglia, right insular cortex, and both
temporal lobes. These findings are consistent with hypoxic ischemic injury.
No acute hemorrhage.
 
 
  SERVICE DATE: 03/26/
EXAM TYPE: RAD - XRY-PORTABLE CHEST XRAY
 
EXAMINATION:
XR PORTABLE CHEST
 
CLINICAL INFORMATION:
Intubated.
 
COMPARISON:
03/25/2018
 
TECHNIQUE:
Portable frontal view of the chest was obtained.
 
FINDINGS:
Cardiomediastinal silhouette is within normal limits. Endotracheal tube again
noted with the tip at the level of the thoracic inlet approximately 6.7 cm
above the kaushal. Enteric tube is in place. Low lung volumes. Persistent mild
hazy opacification again seen within the medial right upper lung zone. Lungs
otherwise appear clear. No pleural effusion or pneumothorax. No acute osseous
abnormality.
 
IMPRESSION:
1. Stable positioning of the endotracheal and enteric tubes.
2. Persistent hazy airspace disease within the medial right upper lung zone.
 
 
Disposition Summary
 
Disposition
Principal Diagnosis:
Encephalopathy secondary to anoxic brain injury and hypoglycemia
Additional Diagnosis:
NSTEMI, s/p cardiac arrest, aspiration pneumonia, ESRD on dialysis, acute 
hypoxic respiratory failure, adrenal insufficiency, anemia, urethral stricture
Discharge Disposition: hospice - medical facilit
 
Discharge Instructions
 
General Discharge Information
Code Status: Comfort Care Only
Patient's Diet:
Per hospice care
Patient's Activity:
Per hospice care
Follow-Up Instructions/Appts:
Per hospice care
Copies To:
Richi BARLOW,Prem MADISON

## 2018-03-27 VITALS — SYSTOLIC BLOOD PRESSURE: 90 MMHG | DIASTOLIC BLOOD PRESSURE: 50 MMHG

## 2018-03-27 NOTE — PN- HOSPICE
Subjective
Subjective:
Family at bedside; , son and his wife and 1 of their sons.  Pt. appears 
comfortable on morphine drip at 2mg/hr.  Received bolus earlier with good 
effect.  Has not required ativan. Febrile to 102. Family asking appropriate 
questions, grieving.   states pt has noisy breathing historically when 
not wearing CPAP for ALAN.
 
Review of Systems
Constitutional:
Reports: see HPI. 
 
Objective
Last 24 Hrs of Vital Signs/I&O
Vital Signs
 
 
Date Time Temp Pulse Resp B/P B/P Pulse O2 O2 Flow FiO2
 
     Mean Ox Delivery Rate 
 
03/27 1006 103.0        
 
03/27 0644 102.1 108 16 90/50  97 Nasal 2.0L 
 
       Cannula  
 
03/27 0000       Nasal 4.0L 
 
       Cannula  
 
03/26 1400       Nasal 4.0L 
 
       Cannula  
 
03/26 1400 100.2 109 24 136/64     
 
 
 Intake & Output
 
 
 03/27 1600 03/27 0800 03/27 0000
 
Intake Total  32 4
 
Output Total  80 
 
Balance  -48 4
 
    
 
Intake, IV  32 4
 
Intake, Oral  0 0
 
Number  2 1
 
Bowel   
 
Movements   
 
Output, Urine  80 
 
 
 
 
Physical Exam
General Appearance: no apparent distress, sedated
Ears, Nose, Throat: dry oral mucosa
Respiratory: no respiratory distress, noisy respirations, unlabored, no 
congestion
Cardiovascular: tachycardia
Extremities: generalized edema, feet warm and no mottling.
Other Physical Findings:
pérez with small amount osbaldo urine
Current Medications:
 Current Medications
 
 
  Sig/Susan Start time  Last
 
Medication Dose Route Stop Time Status Admin
 
Acetaminophen 650 MG Q4P PRN 03/26 1400 AC 03/27
 
  MT   1006
 
Artificial Tears 2 GTT Q2P PRN 03/26 1400 AC 
 
  OU   
 
Glycerin/Mineral Oil 1 ISI Q8P PRN 03/26 1400 AC 
 
  TOP   
 
Glycopyrrolate 400 MCG Q4P PRN 03/26 1400 AC 
 
  IV   
 
Lorazepam 1 MG Q4P PRN 03/26 1400 AC 
 
  IV   
 
Morphine Sulfate 0 [id number] PRN 03/26 1445 AC 03/27
 
  IV   1005
 
Morphine Sulfate 100 MG Q24H 03/26 1400 AC 03/27
 
Dextrose/Water 100 ML IV   0728
 
Scopolamine HBr 1 PAT Q72 03/29 1000 AC 
 
  TOP   
 
 
 
 
Assessment/Plan Hospice
Assessment/Recommendations:
58-year-old female with history of Type 1 diabetes with ESRD found unresponsive 
at home presumed due to hypoglycemia, with aspiration pneumonia, renal failure 
and encephalopathy, placed on hospice care s/p terminal extubation in ICU.
 
Comfortable, continue morphine drip.
Fever-schedule APAP suppository, add ASA MT for ineffective tylenol
Add oasis mouth spray per request
Problem List:
 1. Renal failure (ARF), acute on chronic
 
 2. Encephalopathy
 
 3. Hospice care

## 2018-03-28 VITALS — DIASTOLIC BLOOD PRESSURE: 52 MMHG | SYSTOLIC BLOOD PRESSURE: 110 MMHG

## 2018-03-29 NOTE — DISCHARGE SUMMARY
Visit Information
 
Visit Dates
Admission Date:
18
 
Discharge Date:
18
 
 
Hospital Course
 
Course
Attending Physician:
Nguyễn Isaac MD
 
Primary Care Physician:
Prem Stoddard MD
 
Hospital Course:
58-year-old female with history of Type 1 diabetes with ESRD found unresponsive 
at home presumed due to hypoglycemia, s/p intubation in ED for respiratory 
failure and developed aspiration pneumonia, renal failure and encephalopathy, 
placed on hospice care s/p terminal extubation in ICU.  Pt. was kept comfortable
with morphine drip for dyspnea and tylenol suppository for fever until she 
passed away peacefully with family at bedside.
 
 
Allergies:
Uncoded Allergies:
all pain medications (NAUSEA 10/14/14)
 
 
Disposition Summary
 
Disposition
Principal Diagnosis:
Respiratory failure s/p terminal extubation
Aspiration pneumonia
Acute on chronic renal failure
 
Additional Diagnosis:
Diabetes mellitus type 1
encephalopathy s/p prolonged hypoglycemic state
Discharge Disposition: 
 
Discharge Instructions
 
General Discharge Information
Code Status: Hospice
Patient's Diet:
N/A
Patient's Activity:
N/A
Follow-Up Instructions/Appts:
N/A
Copies To:
Prem Stoddard MD
 
Attending MD Review Statement
Documenting Attending:
Nguyễn Isaac MD
Other Findings:
Agree with the above summary of admission.

## 2023-04-20 NOTE — PN- CARDIOLOGY
Subjective
Subjective:
* Patient remains unresponsive.
* sinus rhythm
* severe hypertension
* status post dialysis today
 
Objective
Vital Signs and I&Os
Vital Signs
 
 
Date Time Temp Pulse Resp B/P B/P Pulse O2 O2 Flow FiO2
 
     Mean Ox Delivery Rate 
 
03/23 2056         30
 
03/23 2048      97 Ventilator 30% 
 
03/23 1848  92  186/90     
 
03/23 1642  80  200/90     
 
03/23 1600      96 Ventilator 30% 
 
03/23 1600 98.3 78 20 204/100  98 Ventilator 30% 
 
03/23 1500  70  200/100     
 
03/23 1433         30
 
03/23 1312  86  202/81     
 
03/23 1200      98 Ventilator 30% 
 
03/23 0856         30
 
03/23 0800      96 Ventilator 30% 
 
03/23 0800 98.9 92 20 188/70  96 Ventilator 30% 
 
03/23 0543         30
 
03/23 0400      97 Ventilator 30% 
 
03/23 0349         30
 
03/23 0043         30
 
03/23 0000 98.9 90 20 154/82  97 Ventilator 30% 
 
03/23 0000      97 Ventilator 30% 
 
03/22 2238         30
 
 
 Intake & Output
 
 
 03/23 1600 03/23 0800 03/23 0000 03/22 1600 03/22 0800 03/22 0000
 
Intake Total 100  120 100 57 257.6
 
Output Total 270 150 300 300  
 
Balance -170 -150 -180 -200 57 257.6
 
       
 
Intake,   120 100 57 197.6
 
Intake, Other      60
 
Number    1 1 2
 
Bowel      
 
Movements      
 
Output, 100     
 
Gastric      
 
Drainage      
 
Output, Urine 170 150 300 300  
 
Patient     176 lb 
 
Weight      
 
Weight     Bed scale 
 
Measurement      
 
Method      
 
 
 
Physical Exam:
General: WD/overweight female. Intubated.
HEENT: NC/AT, pupils reactive to light, no dolls eyes
Neck: no JVD, no carotid bruit
Heart: RRR w/o murmur
Lungs: clear bilaterally
Abdomen: soft, NT, +ve bowel sounds
Extremities: no edema
Neuro: some spontaneous movement of legs bilaterally
 
 
Assessment/Plan
Assessment/Plan
* Patient is severely hypertensive. Would not give medications that are negative
chronotropic agents due to previous severe bradycardia. Begin hydralazine 10mg 
IV TID and increase as necessary to control blood pressure. In consideration of 
the patient's MI NTG paste 1/2 inch Q 6 hours can also be started.
Continue telemetry? Yes N/A